# Patient Record
Sex: FEMALE | Race: OTHER | NOT HISPANIC OR LATINO | URBAN - METROPOLITAN AREA
[De-identification: names, ages, dates, MRNs, and addresses within clinical notes are randomized per-mention and may not be internally consistent; named-entity substitution may affect disease eponyms.]

---

## 2017-04-27 ENCOUNTER — EMERGENCY (EMERGENCY)
Facility: HOSPITAL | Age: 47
LOS: 1 days | Discharge: HOME | End: 2017-04-27
Attending: EMERGENCY MEDICINE | Admitting: EMERGENCY MEDICINE
Payer: COMMERCIAL

## 2017-04-27 VITALS
OXYGEN SATURATION: 100 % | SYSTOLIC BLOOD PRESSURE: 135 MMHG | RESPIRATION RATE: 16 BRPM | DIASTOLIC BLOOD PRESSURE: 94 MMHG | HEART RATE: 80 BPM | TEMPERATURE: 98 F

## 2017-04-27 PROBLEM — Z00.00 ENCOUNTER FOR PREVENTIVE HEALTH EXAMINATION: Status: ACTIVE | Noted: 2017-04-27

## 2017-04-27 LAB
APPEARANCE UR: SIGNIFICANT CHANGE UP
BACTERIA # UR AUTO: SIGNIFICANT CHANGE UP
BILIRUB UR-MCNC: NEGATIVE — SIGNIFICANT CHANGE UP
BLOOD UR QL VISUAL: HIGH
COLOR SPEC: YELLOW — SIGNIFICANT CHANGE UP
GLUCOSE UR-MCNC: NEGATIVE — SIGNIFICANT CHANGE UP
KETONES UR-MCNC: NEGATIVE — SIGNIFICANT CHANGE UP
LEUKOCYTE ESTERASE UR-ACNC: SIGNIFICANT CHANGE UP
MUCOUS THREADS # UR AUTO: SIGNIFICANT CHANGE UP
NITRITE UR-MCNC: NEGATIVE — SIGNIFICANT CHANGE UP
PH UR: 6 — SIGNIFICANT CHANGE UP (ref 4.6–8)
PROT UR-MCNC: 30 — HIGH
RBC CASTS # UR COMP ASSIST: SIGNIFICANT CHANGE UP (ref 0–?)
SP GR SPEC: 1.02 — SIGNIFICANT CHANGE UP (ref 1–1.03)
SQUAMOUS # UR AUTO: SIGNIFICANT CHANGE UP
UROBILINOGEN FLD QL: NORMAL E.U. — SIGNIFICANT CHANGE UP (ref 0.1–0.2)
WBC UR QL: SIGNIFICANT CHANGE UP (ref 0–?)

## 2017-04-27 PROCEDURE — 99285 EMERGENCY DEPT VISIT HI MDM: CPT

## 2017-04-27 RX ORDER — KETOROLAC TROMETHAMINE 30 MG/ML
30 SYRINGE (ML) INJECTION ONCE
Qty: 0 | Refills: 0 | Status: DISCONTINUED | OUTPATIENT
Start: 2017-04-27 | End: 2017-04-27

## 2017-04-27 RX ADMIN — Medication 30 MILLIGRAM(S): at 23:40

## 2017-04-27 NOTE — ED ADULT NURSE NOTE - OBJECTIVE STATEMENT
pt states she developed pain in rt side of abdomen last month and this month with period. states after last period pain did not go away. abd soft, c.o. pain going from rt side to buttock and down rt leg. states sometimes when she drives or sits for a long time her rt leg gets numb. denies vomiting. states she is feels like she wants to. states pain has been going on for 2-3 days. c.o. pain with urination

## 2017-04-27 NOTE — ED PROVIDER NOTE - ATTENDING CONTRIBUTION TO CARE
agree with resident note  46 yr old female with PMH of DM presents with right back/flank pain radiating to leg.  Denies urinary symptoms.  Denies vomiting.  ABle to ambulate.    PE: uncomfortable but not toxic; CTAB/L; s1 s2 no m/r/g abd soft/NT/ND back: right paralumar/ flank pain    ua, CT, pain meds

## 2017-04-27 NOTE — ED PROVIDER NOTE - PROGRESS NOTE DETAILS
ATTG NOTE DR. BLANCHARD CT results reviewed with patient, no abnormalities, no fever, no chills, pt resting comfortably, tolerated PO, ambulatory, abdominal exam normal nontender.  Instructed to follow up with PMD in 1-2 days

## 2017-04-27 NOTE — ED ADULT TRIAGE NOTE - CHIEF COMPLAINT QUOTE
Pt c/o of right flank pain radiates to the upper abdomen and right leg  for the past few days states her stomach feels bloated.

## 2017-04-27 NOTE — ED PROVIDER NOTE - MEDICAL DECISION MAKING DETAILS
MSK strain suspected, possible pyelo UA pending, no signs acute cord compression - NT abdomen w/o dawit's

## 2017-04-28 VITALS
SYSTOLIC BLOOD PRESSURE: 120 MMHG | OXYGEN SATURATION: 100 % | RESPIRATION RATE: 16 BRPM | DIASTOLIC BLOOD PRESSURE: 79 MMHG | TEMPERATURE: 98 F | HEART RATE: 76 BPM

## 2017-04-28 LAB
ALBUMIN SERPL ELPH-MCNC: 4.4 G/DL — SIGNIFICANT CHANGE UP (ref 3.3–5)
ALP SERPL-CCNC: 63 U/L — SIGNIFICANT CHANGE UP (ref 40–120)
ALT FLD-CCNC: 16 U/L — SIGNIFICANT CHANGE UP (ref 4–33)
AST SERPL-CCNC: 17 U/L — SIGNIFICANT CHANGE UP (ref 4–32)
BASOPHILS # BLD AUTO: 0.06 K/UL — SIGNIFICANT CHANGE UP (ref 0–0.2)
BASOPHILS NFR BLD AUTO: 0.7 % — SIGNIFICANT CHANGE UP (ref 0–2)
BILIRUB SERPL-MCNC: 0.5 MG/DL — SIGNIFICANT CHANGE UP (ref 0.2–1.2)
BUN SERPL-MCNC: 7 MG/DL — SIGNIFICANT CHANGE UP (ref 7–23)
CALCIUM SERPL-MCNC: 9.4 MG/DL — SIGNIFICANT CHANGE UP (ref 8.4–10.5)
CHLORIDE SERPL-SCNC: 105 MMOL/L — SIGNIFICANT CHANGE UP (ref 98–107)
CO2 SERPL-SCNC: 22 MMOL/L — SIGNIFICANT CHANGE UP (ref 22–31)
CREAT SERPL-MCNC: 0.8 MG/DL — SIGNIFICANT CHANGE UP (ref 0.5–1.3)
EOSINOPHIL # BLD AUTO: 0.17 K/UL — SIGNIFICANT CHANGE UP (ref 0–0.5)
EOSINOPHIL NFR BLD AUTO: 2 % — SIGNIFICANT CHANGE UP (ref 0–6)
GLUCOSE SERPL-MCNC: 97 MG/DL — SIGNIFICANT CHANGE UP (ref 70–99)
HCT VFR BLD CALC: 39.8 % — SIGNIFICANT CHANGE UP (ref 34.5–45)
HGB BLD-MCNC: 13.1 G/DL — SIGNIFICANT CHANGE UP (ref 11.5–15.5)
IMM GRANULOCYTES NFR BLD AUTO: 0.4 % — SIGNIFICANT CHANGE UP (ref 0–1.5)
LYMPHOCYTES # BLD AUTO: 4.57 K/UL — HIGH (ref 1–3.3)
LYMPHOCYTES # BLD AUTO: 53.3 % — HIGH (ref 13–44)
MCHC RBC-ENTMCNC: 29.4 PG — SIGNIFICANT CHANGE UP (ref 27–34)
MCHC RBC-ENTMCNC: 32.9 % — SIGNIFICANT CHANGE UP (ref 32–36)
MCV RBC AUTO: 89.4 FL — SIGNIFICANT CHANGE UP (ref 80–100)
MONOCYTES # BLD AUTO: 0.5 K/UL — SIGNIFICANT CHANGE UP (ref 0–0.9)
MONOCYTES NFR BLD AUTO: 5.8 % — SIGNIFICANT CHANGE UP (ref 2–14)
NEUTROPHILS # BLD AUTO: 3.24 K/UL — SIGNIFICANT CHANGE UP (ref 1.8–7.4)
NEUTROPHILS NFR BLD AUTO: 37.8 % — LOW (ref 43–77)
PLATELET # BLD AUTO: 319 K/UL — SIGNIFICANT CHANGE UP (ref 150–400)
PMV BLD: 10.6 FL — SIGNIFICANT CHANGE UP (ref 7–13)
POTASSIUM SERPL-MCNC: 3.8 MMOL/L — SIGNIFICANT CHANGE UP (ref 3.5–5.3)
POTASSIUM SERPL-SCNC: 3.8 MMOL/L — SIGNIFICANT CHANGE UP (ref 3.5–5.3)
PROT SERPL-MCNC: 7.3 G/DL — SIGNIFICANT CHANGE UP (ref 6–8.3)
RBC # BLD: 4.45 M/UL — SIGNIFICANT CHANGE UP (ref 3.8–5.2)
RBC # FLD: 13.9 % — SIGNIFICANT CHANGE UP (ref 10.3–14.5)
SODIUM SERPL-SCNC: 141 MMOL/L — SIGNIFICANT CHANGE UP (ref 135–145)
WBC # BLD: 8.57 K/UL — SIGNIFICANT CHANGE UP (ref 3.8–10.5)
WBC # FLD AUTO: 8.57 K/UL — SIGNIFICANT CHANGE UP (ref 3.8–10.5)

## 2017-04-28 PROCEDURE — 74176 CT ABD & PELVIS W/O CONTRAST: CPT | Mod: 26

## 2017-04-29 LAB
BACTERIA UR CULT: SIGNIFICANT CHANGE UP
SPECIMEN SOURCE: SIGNIFICANT CHANGE UP

## 2019-09-30 ENCOUNTER — EMERGENCY (EMERGENCY)
Facility: HOSPITAL | Age: 49
LOS: 1 days | Discharge: HOME | End: 2019-09-30
Attending: STUDENT IN AN ORGANIZED HEALTH CARE EDUCATION/TRAINING PROGRAM | Admitting: STUDENT IN AN ORGANIZED HEALTH CARE EDUCATION/TRAINING PROGRAM
Payer: COMMERCIAL

## 2019-09-30 VITALS
DIASTOLIC BLOOD PRESSURE: 93 MMHG | TEMPERATURE: 98 F | RESPIRATION RATE: 18 BRPM | HEART RATE: 91 BPM | SYSTOLIC BLOOD PRESSURE: 148 MMHG | OXYGEN SATURATION: 99 %

## 2019-09-30 VITALS
HEART RATE: 99 BPM | TEMPERATURE: 98 F | DIASTOLIC BLOOD PRESSURE: 75 MMHG | OXYGEN SATURATION: 100 % | RESPIRATION RATE: 16 BRPM | SYSTOLIC BLOOD PRESSURE: 165 MMHG

## 2019-09-30 PROBLEM — E11.9 TYPE 2 DIABETES MELLITUS WITHOUT COMPLICATIONS: Chronic | Status: ACTIVE | Noted: 2017-04-27

## 2019-09-30 LAB
ALBUMIN SERPL ELPH-MCNC: 4.4 G/DL — SIGNIFICANT CHANGE UP (ref 3.3–5)
ALP SERPL-CCNC: 92 U/L — SIGNIFICANT CHANGE UP (ref 40–120)
ALT FLD-CCNC: 33 U/L — SIGNIFICANT CHANGE UP (ref 4–33)
ANION GAP SERPL CALC-SCNC: 13 MMO/L — SIGNIFICANT CHANGE UP (ref 7–14)
APPEARANCE UR: CLEAR — SIGNIFICANT CHANGE UP
AST SERPL-CCNC: 33 U/L — HIGH (ref 4–32)
BASOPHILS # BLD AUTO: 0.08 K/UL — SIGNIFICANT CHANGE UP (ref 0–0.2)
BASOPHILS NFR BLD AUTO: 0.8 % — SIGNIFICANT CHANGE UP (ref 0–2)
BILIRUB SERPL-MCNC: 0.2 MG/DL — SIGNIFICANT CHANGE UP (ref 0.2–1.2)
BILIRUB UR-MCNC: NEGATIVE — SIGNIFICANT CHANGE UP
BLOOD UR QL VISUAL: SIGNIFICANT CHANGE UP
BUN SERPL-MCNC: 12 MG/DL — SIGNIFICANT CHANGE UP (ref 7–23)
CALCIUM SERPL-MCNC: 9.6 MG/DL — SIGNIFICANT CHANGE UP (ref 8.4–10.5)
CHLORIDE SERPL-SCNC: 103 MMOL/L — SIGNIFICANT CHANGE UP (ref 98–107)
CO2 SERPL-SCNC: 25 MMOL/L — SIGNIFICANT CHANGE UP (ref 22–31)
COLOR SPEC: COLORLESS — SIGNIFICANT CHANGE UP
CREAT SERPL-MCNC: 0.92 MG/DL — SIGNIFICANT CHANGE UP (ref 0.5–1.3)
EOSINOPHIL # BLD AUTO: 0.11 K/UL — SIGNIFICANT CHANGE UP (ref 0–0.5)
EOSINOPHIL NFR BLD AUTO: 1.1 % — SIGNIFICANT CHANGE UP (ref 0–6)
GLUCOSE SERPL-MCNC: 169 MG/DL — HIGH (ref 70–99)
GLUCOSE UR-MCNC: NEGATIVE — SIGNIFICANT CHANGE UP
HCT VFR BLD CALC: 39 % — SIGNIFICANT CHANGE UP (ref 34.5–45)
HGB BLD-MCNC: 12.3 G/DL — SIGNIFICANT CHANGE UP (ref 11.5–15.5)
IMM GRANULOCYTES NFR BLD AUTO: 0.6 % — SIGNIFICANT CHANGE UP (ref 0–1.5)
KETONES UR-MCNC: NEGATIVE — SIGNIFICANT CHANGE UP
LEUKOCYTE ESTERASE UR-ACNC: NEGATIVE — SIGNIFICANT CHANGE UP
LYMPHOCYTES # BLD AUTO: 4.34 K/UL — HIGH (ref 1–3.3)
LYMPHOCYTES # BLD AUTO: 43.4 % — SIGNIFICANT CHANGE UP (ref 13–44)
MCHC RBC-ENTMCNC: 28.7 PG — SIGNIFICANT CHANGE UP (ref 27–34)
MCHC RBC-ENTMCNC: 31.5 % — LOW (ref 32–36)
MCV RBC AUTO: 90.9 FL — SIGNIFICANT CHANGE UP (ref 80–100)
MONOCYTES # BLD AUTO: 0.88 K/UL — SIGNIFICANT CHANGE UP (ref 0–0.9)
MONOCYTES NFR BLD AUTO: 8.8 % — SIGNIFICANT CHANGE UP (ref 2–14)
NEUTROPHILS # BLD AUTO: 4.53 K/UL — SIGNIFICANT CHANGE UP (ref 1.8–7.4)
NEUTROPHILS NFR BLD AUTO: 45.3 % — SIGNIFICANT CHANGE UP (ref 43–77)
NITRITE UR-MCNC: NEGATIVE — SIGNIFICANT CHANGE UP
NRBC # FLD: 0 K/UL — SIGNIFICANT CHANGE UP (ref 0–0)
PH UR: 7.5 — SIGNIFICANT CHANGE UP (ref 5–8)
PLATELET # BLD AUTO: 322 K/UL — SIGNIFICANT CHANGE UP (ref 150–400)
PMV BLD: 11 FL — SIGNIFICANT CHANGE UP (ref 7–13)
POTASSIUM SERPL-MCNC: 4.2 MMOL/L — SIGNIFICANT CHANGE UP (ref 3.5–5.3)
POTASSIUM SERPL-SCNC: 4.2 MMOL/L — SIGNIFICANT CHANGE UP (ref 3.5–5.3)
PROT SERPL-MCNC: 7.4 G/DL — SIGNIFICANT CHANGE UP (ref 6–8.3)
PROT UR-MCNC: NEGATIVE — SIGNIFICANT CHANGE UP
RBC # BLD: 4.29 M/UL — SIGNIFICANT CHANGE UP (ref 3.8–5.2)
RBC # FLD: 14.5 % — SIGNIFICANT CHANGE UP (ref 10.3–14.5)
SODIUM SERPL-SCNC: 141 MMOL/L — SIGNIFICANT CHANGE UP (ref 135–145)
SP GR SPEC: 1.01 — SIGNIFICANT CHANGE UP (ref 1–1.04)
TROPONIN T, HIGH SENSITIVITY: 12 NG/L — SIGNIFICANT CHANGE UP (ref ?–14)
TROPONIN T, HIGH SENSITIVITY: 15 NG/L — SIGNIFICANT CHANGE UP (ref ?–14)
UROBILINOGEN FLD QL: NORMAL — SIGNIFICANT CHANGE UP
WBC # BLD: 10 K/UL — SIGNIFICANT CHANGE UP (ref 3.8–10.5)
WBC # FLD AUTO: 10 K/UL — SIGNIFICANT CHANGE UP (ref 3.8–10.5)

## 2019-09-30 PROCEDURE — 99284 EMERGENCY DEPT VISIT MOD MDM: CPT | Mod: 25

## 2019-09-30 PROCEDURE — 93010 ELECTROCARDIOGRAM REPORT: CPT

## 2019-09-30 PROCEDURE — 71046 X-RAY EXAM CHEST 2 VIEWS: CPT | Mod: 26

## 2019-09-30 RX ORDER — IBUPROFEN 200 MG
600 TABLET ORAL ONCE
Refills: 0 | Status: DISCONTINUED | OUTPATIENT
Start: 2019-09-30 | End: 2019-10-04

## 2019-09-30 RX ORDER — SODIUM CHLORIDE 9 MG/ML
1000 INJECTION INTRAMUSCULAR; INTRAVENOUS; SUBCUTANEOUS ONCE
Refills: 0 | Status: COMPLETED | OUTPATIENT
Start: 2019-09-30 | End: 2019-09-30

## 2019-09-30 RX ORDER — METOCLOPRAMIDE HCL 10 MG
10 TABLET ORAL ONCE
Refills: 0 | Status: COMPLETED | OUTPATIENT
Start: 2019-09-30 | End: 2019-09-30

## 2019-09-30 RX ORDER — ACETAMINOPHEN 500 MG
975 TABLET ORAL ONCE
Refills: 0 | Status: COMPLETED | OUTPATIENT
Start: 2019-09-30 | End: 2019-09-30

## 2019-09-30 RX ADMIN — Medication 10 MILLIGRAM(S): at 03:46

## 2019-09-30 RX ADMIN — SODIUM CHLORIDE 1000 MILLILITER(S): 9 INJECTION INTRAMUSCULAR; INTRAVENOUS; SUBCUTANEOUS at 03:46

## 2019-09-30 RX ADMIN — Medication 975 MILLIGRAM(S): at 03:46

## 2019-09-30 NOTE — ED PROVIDER NOTE - OBJECTIVE STATEMENT
Libia Vigil MD: 48 yoF PMH DM2, HLD, B12 deficiency p/w with multiple complaints of gradually sharp global headache, blurry vision, nausea, and chest pain x1 day, As per daughter pt was getting ready to go to a wedding  were sick with a URI Libia Vigil MD: 48 yoF PMH DM2, HLD, B12 deficiency, Migrane p/w with multiple complaints of gradually sharp global headache, blurry vision, nausea, and chest pain x1 day, As per daughter pt was getting ready to go to a wedding. CP nonradiating, worse with palpating her chest not associated with inspiration, position, exertion or food consumption.  pt report new cough that just started when she got to the ED. Pt denies vomiting, shortness of breath, weakness, fatigue, lightheadedness.  Pt has had no recent long trips, surgery, trauma, denies hemoptysis, and has no hx of DVT/PE. PERC 0.  Pt reports chills but denies fevers, . Pts's daughter and son was recently sick with a URI. Pt also states that her daughter and son jus got over a URI.

## 2019-09-30 NOTE — ED PROVIDER NOTE - ATTENDING CONTRIBUTION TO CARE
Jenny Cruz M.D: 48F hx DM, HLD p/w multiple complaints including headche, myalgias, substernal cp (nonradiating, no aggravating/relieving factors, constant). +cough. +nausea no f/c no abd pain, daughter at bedside notes similar symptoms the last few days. on exam pt appears tired, but nontoxic, lungs ctab heart rrr, mucous memranes moist, cn 2-12 itnact gross motor and senosyr intact in all extremities. no neck stiffness, full ROM in neck without difficulty. constellation of symptoms seem consistent with viral syndrome, with some component of migraine which pt has had before. no concern for meningitis or pneumonia. PERC negative so no concern for pe symptoms not c/w dissection. given some cardiac rfs will check ekg, trop, basic labs, migtraine cocktail and reassess, likely dc.

## 2019-09-30 NOTE — ED ADULT NURSE NOTE - OBJECTIVE STATEMENT
49 yo F AAOx4 received to 18 with multiple medical complaints: HA, head pressure, dizziness, CP, SOB with sudden onset ~ 1600, pt tearful at present, unable to answer further assessment questions due to pains' severity, pt tearful at present, hx DM with daily metformin and HLD with no current medication regiment, VSS, awaiting MD soler, will monitor 47 yo F AAOx4 received to 18 with multiple medical complaints: HA, head pressure, n/v, dizziness, CP, SOB with sudden onset ~ 1600, unable to answer further assessment questions due to pains' severity, pt tearful at present, daughter at bedside, hx DM with daily metformin and HLD with no current medication regiment, VSS, awaiting MD soler, will monitor

## 2019-09-30 NOTE — ED PROVIDER NOTE - PHYSICAL EXAMINATION
Gen: AAOx3, non-toxic  Head: NCAT  HEENT: EOMI, PERRLA, oral mucosa moist, normal conjunctiva  Lung: CTAB, no respiratory distress, no wheezes/rhonchi/rales B/L, speaking in full sentences  CV: RRR, no murmurs, rubs or gallops. TTP of thoracic wall  Abd: soft, NTND, no guarding, no CVA tenderness, no rebound tenderness  MSK: no visible deformities, full range of motion of all 4 exts  Neuro: No focal sensory or motor deficits  Skin: Warm, well perfused, no rash  Psych: normal affect.   ~Libia Vigil MD

## 2019-09-30 NOTE — ED PROVIDER NOTE - NSFOLLOWUPINSTRUCTIONS_ED_ALL_ED_FT
1) Please follow-up with your primary care doctor in the next 2-3 days.  Please call tomorrow for an appointment.  If you cannot follow-up with your primary care doctor please return to the ED for any urgent issues. Please also follow up with your Cardiologist in 2-5 days. If you don't have a cardiologist the number to [facility] cardiology is provided above  2) You were given a copy of the tests performed today.  Please bring the results with you and review them with your primary care doctor.  3) If you have any worsening of symptoms or any other concerns please return to the ED immediately. Such as but not limited to including chest pain, shortness or breath, or weakness.  4) Please continue taking your home medications as directed. Please take Motrin (Ibuprofen) 600mg by mouth every 6 hours as needed for pain. Please take this medication with food. Please take Tylenol (Acetaminophen) 975 mg every 6 hours as needed for pain. Please do not exceed more than 4,000mg of Tylenol in a day

## 2019-09-30 NOTE — ED PROVIDER NOTE - NS ED ROS FT
GENERAL: No fever or + chills, EYES: no change in vision, HEENT: no trouble speaking, CARDIAC: + chest pain, palpitation PULMONARY: + cough or SOB, GI: no abdominal pain, + nausea, no vomiting, no diarrhea or constipation, : + dysuria, SKIN: no rashes, NEURO: + headache,  MSK: No muscle pain ~Libia Vigil MD

## 2019-09-30 NOTE — ED PROVIDER NOTE - CLINICAL SUMMARY MEDICAL DECISION MAKING FREE TEXT BOX
iLbia Vigil MD: 48 yoF PMH DM2, HLD, B12 deficiency, Migrane p/w with multiple complaints of gradually sharp global headache, blurry vision, nausea, and chest pain x1 day. Most like viral infection triggering myalgias and migraine. Cp most likely MSk but due to PMH will r/o ACS vs PNA. Will get lab trop, ekg, cxr.

## 2019-09-30 NOTE — ED PROVIDER NOTE - PATIENT PORTAL LINK FT
You can access the FollowMyHealth Patient Portal offered by [facility] by registering at the following website: [email or web address]. By joining [facility]’s FollowMyHealth portal, you will also be able to view your health information using other applications (apps) compatible with our system.

## 2019-09-30 NOTE — ED ADULT TRIAGE NOTE - CHIEF COMPLAINT QUOTE
Pt arrives w/ daughter c/o headache, pressure dizziness, nausea, and chest pain, denies vomiting, blurry vision. Onset around 4pm, increasing in severity. Pt states HA began first then chest pain, had similar episode but resolved quickly 2 days ago. PMHx DM (metformin), HLD (not on meds).  EKG obtained.

## 2019-10-01 LAB
BACTERIA UR CULT: SIGNIFICANT CHANGE UP
SPECIMEN SOURCE: SIGNIFICANT CHANGE UP

## 2021-08-14 ENCOUNTER — INPATIENT (INPATIENT)
Facility: HOSPITAL | Age: 51
LOS: 4 days | Discharge: NURSING, REHAB OR LONG-TERM CARE | End: 2021-08-19
Attending: INTERNAL MEDICINE | Admitting: INTERNAL MEDICINE
Payer: COMMERCIAL

## 2021-08-14 VITALS
TEMPERATURE: 98 F | RESPIRATION RATE: 16 BRPM | HEART RATE: 98 BPM | OXYGEN SATURATION: 100 % | SYSTOLIC BLOOD PRESSURE: 158 MMHG | DIASTOLIC BLOOD PRESSURE: 118 MMHG

## 2021-08-14 DIAGNOSIS — E78.5 HYPERLIPIDEMIA, UNSPECIFIED: ICD-10-CM

## 2021-08-14 DIAGNOSIS — R53.1 WEAKNESS: ICD-10-CM

## 2021-08-14 DIAGNOSIS — E53.8 DEFICIENCY OF OTHER SPECIFIED B GROUP VITAMINS: ICD-10-CM

## 2021-08-14 DIAGNOSIS — Z29.9 ENCOUNTER FOR PROPHYLACTIC MEASURES, UNSPECIFIED: ICD-10-CM

## 2021-08-14 DIAGNOSIS — G43.909 MIGRAINE, UNSPECIFIED, NOT INTRACTABLE, WITHOUT STATUS MIGRAINOSUS: ICD-10-CM

## 2021-08-14 DIAGNOSIS — E11.9 TYPE 2 DIABETES MELLITUS WITHOUT COMPLICATIONS: ICD-10-CM

## 2021-08-14 LAB
A1C WITH ESTIMATED AVERAGE GLUCOSE RESULT: 7.2 % — HIGH (ref 4–5.6)
ALBUMIN SERPL ELPH-MCNC: 5.1 G/DL — HIGH (ref 3.3–5)
ALP SERPL-CCNC: 107 U/L — SIGNIFICANT CHANGE UP (ref 40–120)
ALT FLD-CCNC: 26 U/L — SIGNIFICANT CHANGE UP (ref 4–33)
ANION GAP SERPL CALC-SCNC: 16 MMOL/L — HIGH (ref 7–14)
APTT BLD: 35.2 SEC — SIGNIFICANT CHANGE UP (ref 27–36.3)
AST SERPL-CCNC: 21 U/L — SIGNIFICANT CHANGE UP (ref 4–32)
B PERT DNA SPEC QL NAA+PROBE: SIGNIFICANT CHANGE UP
B PERT+PARAPERT DNA PNL SPEC NAA+PROBE: SIGNIFICANT CHANGE UP
BASOPHILS # BLD AUTO: 0.06 K/UL — SIGNIFICANT CHANGE UP (ref 0–0.2)
BASOPHILS NFR BLD AUTO: 0.9 % — SIGNIFICANT CHANGE UP (ref 0–2)
BILIRUB SERPL-MCNC: 0.5 MG/DL — SIGNIFICANT CHANGE UP (ref 0.2–1.2)
BLD GP AB SCN SERPL QL: NEGATIVE — SIGNIFICANT CHANGE UP
BORDETELLA PARAPERTUSSIS (RAPRVP): SIGNIFICANT CHANGE UP
BUN SERPL-MCNC: 11 MG/DL — SIGNIFICANT CHANGE UP (ref 7–23)
C PNEUM DNA SPEC QL NAA+PROBE: SIGNIFICANT CHANGE UP
CALCIUM SERPL-MCNC: 9.8 MG/DL — SIGNIFICANT CHANGE UP (ref 8.4–10.5)
CHLORIDE SERPL-SCNC: 103 MMOL/L — SIGNIFICANT CHANGE UP (ref 98–107)
CK MB BLD-MCNC: <0.7 % — SIGNIFICANT CHANGE UP (ref 0–2.5)
CK MB BLD-MCNC: <1 % — SIGNIFICANT CHANGE UP (ref 0–2.5)
CK MB CFR SERPL CALC: <1 NG/ML — SIGNIFICANT CHANGE UP
CK MB CFR SERPL CALC: <1 NG/ML — SIGNIFICANT CHANGE UP
CK SERPL-CCNC: 144 U/L — SIGNIFICANT CHANGE UP (ref 25–170)
CK SERPL-CCNC: 99 U/L — SIGNIFICANT CHANGE UP (ref 25–170)
CO2 SERPL-SCNC: 23 MMOL/L — SIGNIFICANT CHANGE UP (ref 22–31)
CREAT SERPL-MCNC: 0.72 MG/DL — SIGNIFICANT CHANGE UP (ref 0.5–1.3)
EOSINOPHIL # BLD AUTO: 0.09 K/UL — SIGNIFICANT CHANGE UP (ref 0–0.5)
EOSINOPHIL NFR BLD AUTO: 1.3 % — SIGNIFICANT CHANGE UP (ref 0–6)
ESTIMATED AVERAGE GLUCOSE: 160 — SIGNIFICANT CHANGE UP
FLUAV SUBTYP SPEC NAA+PROBE: SIGNIFICANT CHANGE UP
FLUBV RNA SPEC QL NAA+PROBE: SIGNIFICANT CHANGE UP
FOLATE SERPL-MCNC: 20 NG/ML — HIGH (ref 3.1–17.5)
GLUCOSE BLDC GLUCOMTR-MCNC: 104 MG/DL — HIGH (ref 70–99)
GLUCOSE BLDC GLUCOMTR-MCNC: 105 MG/DL — HIGH (ref 70–99)
GLUCOSE SERPL-MCNC: 153 MG/DL — HIGH (ref 70–99)
HADV DNA SPEC QL NAA+PROBE: SIGNIFICANT CHANGE UP
HCG SERPL-ACNC: <5 MIU/ML — SIGNIFICANT CHANGE UP
HCOV 229E RNA SPEC QL NAA+PROBE: SIGNIFICANT CHANGE UP
HCOV HKU1 RNA SPEC QL NAA+PROBE: SIGNIFICANT CHANGE UP
HCOV NL63 RNA SPEC QL NAA+PROBE: SIGNIFICANT CHANGE UP
HCOV OC43 RNA SPEC QL NAA+PROBE: SIGNIFICANT CHANGE UP
HCT VFR BLD CALC: 45.5 % — HIGH (ref 34.5–45)
HGB BLD-MCNC: 14.7 G/DL — SIGNIFICANT CHANGE UP (ref 11.5–15.5)
HMPV RNA SPEC QL NAA+PROBE: SIGNIFICANT CHANGE UP
HPIV1 RNA SPEC QL NAA+PROBE: SIGNIFICANT CHANGE UP
HPIV2 RNA SPEC QL NAA+PROBE: SIGNIFICANT CHANGE UP
HPIV3 RNA SPEC QL NAA+PROBE: SIGNIFICANT CHANGE UP
HPIV4 RNA SPEC QL NAA+PROBE: SIGNIFICANT CHANGE UP
IANC: 3.09 K/UL — SIGNIFICANT CHANGE UP (ref 1.5–8.5)
IMM GRANULOCYTES NFR BLD AUTO: 0.3 % — SIGNIFICANT CHANGE UP (ref 0–1.5)
INR BLD: 1.04 RATIO — SIGNIFICANT CHANGE UP (ref 0.88–1.16)
LYMPHOCYTES # BLD AUTO: 3.25 K/UL — SIGNIFICANT CHANGE UP (ref 1–3.3)
LYMPHOCYTES # BLD AUTO: 47.2 % — HIGH (ref 13–44)
M PNEUMO DNA SPEC QL NAA+PROBE: SIGNIFICANT CHANGE UP
MCHC RBC-ENTMCNC: 28.3 PG — SIGNIFICANT CHANGE UP (ref 27–34)
MCHC RBC-ENTMCNC: 32.3 GM/DL — SIGNIFICANT CHANGE UP (ref 32–36)
MCV RBC AUTO: 87.7 FL — SIGNIFICANT CHANGE UP (ref 80–100)
MONOCYTES # BLD AUTO: 0.37 K/UL — SIGNIFICANT CHANGE UP (ref 0–0.9)
MONOCYTES NFR BLD AUTO: 5.4 % — SIGNIFICANT CHANGE UP (ref 2–14)
NEUTROPHILS # BLD AUTO: 3.09 K/UL — SIGNIFICANT CHANGE UP (ref 1.8–7.4)
NEUTROPHILS NFR BLD AUTO: 44.9 % — SIGNIFICANT CHANGE UP (ref 43–77)
NRBC # BLD: 0 /100 WBCS — SIGNIFICANT CHANGE UP
NRBC # FLD: 0 K/UL — SIGNIFICANT CHANGE UP
PLATELET # BLD AUTO: 325 K/UL — SIGNIFICANT CHANGE UP (ref 150–400)
POTASSIUM SERPL-MCNC: 4.1 MMOL/L — SIGNIFICANT CHANGE UP (ref 3.5–5.3)
POTASSIUM SERPL-SCNC: 4.1 MMOL/L — SIGNIFICANT CHANGE UP (ref 3.5–5.3)
PROT SERPL-MCNC: 7.9 G/DL — SIGNIFICANT CHANGE UP (ref 6–8.3)
PROTHROM AB SERPL-ACNC: 11.9 SEC — SIGNIFICANT CHANGE UP (ref 10.6–13.6)
RAPID RVP RESULT: SIGNIFICANT CHANGE UP
RBC # BLD: 5.19 M/UL — SIGNIFICANT CHANGE UP (ref 3.8–5.2)
RBC # FLD: 13.9 % — SIGNIFICANT CHANGE UP (ref 10.3–14.5)
RH IG SCN BLD-IMP: POSITIVE — SIGNIFICANT CHANGE UP
RSV RNA SPEC QL NAA+PROBE: SIGNIFICANT CHANGE UP
RV+EV RNA SPEC QL NAA+PROBE: SIGNIFICANT CHANGE UP
SARS-COV-2 RNA SPEC QL NAA+PROBE: SIGNIFICANT CHANGE UP
SODIUM SERPL-SCNC: 142 MMOL/L — SIGNIFICANT CHANGE UP (ref 135–145)
TROPONIN T, HIGH SENSITIVITY RESULT: <6 NG/L — SIGNIFICANT CHANGE UP
TROPONIN T, HIGH SENSITIVITY RESULT: <6 NG/L — SIGNIFICANT CHANGE UP
VIT B12 SERPL-MCNC: 451 PG/ML — SIGNIFICANT CHANGE UP (ref 200–900)
WBC # BLD: 6.88 K/UL — SIGNIFICANT CHANGE UP (ref 3.8–10.5)
WBC # FLD AUTO: 6.88 K/UL — SIGNIFICANT CHANGE UP (ref 3.8–10.5)

## 2021-08-14 PROCEDURE — 70496 CT ANGIOGRAPHY HEAD: CPT | Mod: 26

## 2021-08-14 PROCEDURE — 0042T: CPT

## 2021-08-14 PROCEDURE — 70498 CT ANGIOGRAPHY NECK: CPT | Mod: 26

## 2021-08-14 PROCEDURE — 93010 ELECTROCARDIOGRAM REPORT: CPT

## 2021-08-14 PROCEDURE — 71045 X-RAY EXAM CHEST 1 VIEW: CPT | Mod: 26

## 2021-08-14 PROCEDURE — 99285 EMERGENCY DEPT VISIT HI MDM: CPT | Mod: 25

## 2021-08-14 PROCEDURE — 99223 1ST HOSP IP/OBS HIGH 75: CPT

## 2021-08-14 RX ORDER — PANTOPRAZOLE SODIUM 20 MG/1
40 TABLET, DELAYED RELEASE ORAL DAILY
Refills: 0 | Status: DISCONTINUED | OUTPATIENT
Start: 2021-08-14 | End: 2021-08-19

## 2021-08-14 RX ORDER — SODIUM CHLORIDE 9 MG/ML
1000 INJECTION, SOLUTION INTRAVENOUS
Refills: 0 | Status: DISCONTINUED | OUTPATIENT
Start: 2021-08-14 | End: 2021-08-19

## 2021-08-14 RX ORDER — ACETAMINOPHEN 500 MG
650 TABLET ORAL EVERY 6 HOURS
Refills: 0 | Status: DISCONTINUED | OUTPATIENT
Start: 2021-08-14 | End: 2021-08-15

## 2021-08-14 RX ORDER — GLUCAGON INJECTION, SOLUTION 0.5 MG/.1ML
1 INJECTION, SOLUTION SUBCUTANEOUS ONCE
Refills: 0 | Status: DISCONTINUED | OUTPATIENT
Start: 2021-08-14 | End: 2021-08-19

## 2021-08-14 RX ORDER — INSULIN LISPRO 100/ML
VIAL (ML) SUBCUTANEOUS
Refills: 0 | Status: DISCONTINUED | OUTPATIENT
Start: 2021-08-14 | End: 2021-08-19

## 2021-08-14 RX ORDER — DEXTROSE 50 % IN WATER 50 %
25 SYRINGE (ML) INTRAVENOUS ONCE
Refills: 0 | Status: DISCONTINUED | OUTPATIENT
Start: 2021-08-14 | End: 2021-08-19

## 2021-08-14 RX ORDER — ACETAMINOPHEN 500 MG
1000 TABLET ORAL ONCE
Refills: 0 | Status: COMPLETED | OUTPATIENT
Start: 2021-08-14 | End: 2021-08-14

## 2021-08-14 RX ORDER — ASPIRIN/CALCIUM CARB/MAGNESIUM 324 MG
81 TABLET ORAL DAILY
Refills: 0 | Status: DISCONTINUED | OUTPATIENT
Start: 2021-08-14 | End: 2021-08-19

## 2021-08-14 RX ORDER — DEXTROSE 50 % IN WATER 50 %
15 SYRINGE (ML) INTRAVENOUS ONCE
Refills: 0 | Status: DISCONTINUED | OUTPATIENT
Start: 2021-08-14 | End: 2021-08-19

## 2021-08-14 RX ORDER — NITROGLYCERIN 6.5 MG
0.4 CAPSULE, EXTENDED RELEASE ORAL ONCE
Refills: 0 | Status: COMPLETED | OUTPATIENT
Start: 2021-08-14 | End: 2021-08-14

## 2021-08-14 RX ORDER — DEXTROSE 50 % IN WATER 50 %
12.5 SYRINGE (ML) INTRAVENOUS ONCE
Refills: 0 | Status: DISCONTINUED | OUTPATIENT
Start: 2021-08-14 | End: 2021-08-19

## 2021-08-14 RX ORDER — INSULIN LISPRO 100/ML
VIAL (ML) SUBCUTANEOUS AT BEDTIME
Refills: 0 | Status: DISCONTINUED | OUTPATIENT
Start: 2021-08-14 | End: 2021-08-19

## 2021-08-14 RX ORDER — ATORVASTATIN CALCIUM 80 MG/1
80 TABLET, FILM COATED ORAL AT BEDTIME
Refills: 0 | Status: DISCONTINUED | OUTPATIENT
Start: 2021-08-14 | End: 2021-08-19

## 2021-08-14 RX ORDER — ASPIRIN/CALCIUM CARB/MAGNESIUM 324 MG
300 TABLET ORAL ONCE
Refills: 0 | Status: DISCONTINUED | OUTPATIENT
Start: 2021-08-14 | End: 2021-08-14

## 2021-08-14 RX ADMIN — PANTOPRAZOLE SODIUM 40 MILLIGRAM(S): 20 TABLET, DELAYED RELEASE ORAL at 22:58

## 2021-08-14 RX ADMIN — Medication 0.4 MILLIGRAM(S): at 22:58

## 2021-08-14 RX ADMIN — Medication 400 MILLIGRAM(S): at 12:50

## 2021-08-14 RX ADMIN — Medication 1000 MILLIGRAM(S): at 13:22

## 2021-08-14 NOTE — H&P ADULT - ATTENDING COMMENTS
Arline Espinoza 51 yo lady Sinhala speaking with h/o T2DM, B12 deficiency, daughter at bedside patient gestures for her to translate. P/W R. sided weakness which started some time today.    Failed dysphagia screen  Code stroke, seen by neurology  Permissive hypertension  Will require stroke work up, MRI head, echo, PT eval, Speech swallow eval (failed dysphagia screen), TSH, B12, folic acid level  Aspirin and atorvastatin daily  Continue the rest of the work up and management as stated above.

## 2021-08-14 NOTE — ED ADULT NURSE NOTE - OBJECTIVE STATEMENT
Pt presents to ED ambulatory from home with c/o R sided facial numbness, RUE and RLE numbness and weakness x 2 days. Pt unable to move R side. MD at bedside, code stroke initiated. Pt denies dizziness or vision changes. Pt found to be hypertensive, and reports headache. Pt has hx of DM and vitamin b12 deficiency. Pt brought to CT scan, #18g IV placed to LAC, lab work collected as ordered. Neuro at bedside. Will continue to monitor.

## 2021-08-14 NOTE — ED PROVIDER NOTE - OBJECTIVE STATEMENT
49 yo F (Italian speaking, translation provided by daughter at bedside) PMH DM2, HLD, B12 deficiency, Migraines presents with acute weakness this morning in the context of extremity numbness x2 days. Patient noted "tingling" sensation throughout all 4 extremities for the past 2 days prior to ED visit. Patient then complained of acute onset generalized weakness this AM. Last known normal of 11PM on 8/13. Patient's daughter noted patient had difficulty making coffee this AM and prompted ED visit. Patient also noted to have difficulty with ambulation. Upon arrival to the ED right sided UE and LE weakness noted with increased numbness on the right face/UE/LE also noted. CODE STROKE was called

## 2021-08-14 NOTE — H&P ADULT - ASSESSMENT
49 y/o female (Persian speaking, translation provided by daughter at bedside) with PMH of T2DM not on prescribe medications, HLD, B12 deficiency with neuropathy, migraines presented to ED with acute weakness, tingling of upper and lower extremities, right > left. Admitted for CVA management.

## 2021-08-14 NOTE — ED ADULT TRIAGE NOTE - CHIEF COMPLAINT QUOTE
Pt brought in by daughter for numbness to both hands, numbness to both legs, + weakness, numbness to lips and headache/warmness to Rt side of head for the past 2 days. Pt currently afebrile. Pt denies chest pain, no shortness of breath.

## 2021-08-14 NOTE — ED PROVIDER NOTE - ST/T WAVE
pregnancy.





Infant's condition and plans as above reviewed and discussed with parents at 

bedside who agreed with the plans and voiced understanding.











Pepper Harvey MD R2 May 17, 2018 16:05
no KAMALJIT/depr, no TWA

## 2021-08-14 NOTE — ED PROVIDER NOTE - PROGRESS NOTE DETAILS
Code stroke work up revealed multifocal narrowing in A2 of anterior cerebral artery. admitting to tele for further stroke work up

## 2021-08-14 NOTE — CONSULT NOTE ADULT - SUBJECTIVE AND OBJECTIVE BOX
HPI: A 49 yo F (Romansh speaking, translation provided by daughter at bedside) PMH DM2, HLD, B12 deficiency w/ neuropathy, migraines presented to ED with acute weakness this morning in the context of extremity numbness x2 days. Patient noted "tingling" sensation throughout all 4 extremities for the past 2 days prior to ED visit. LNW was on 8/12/2021 at 11 AM.  Code stroke was called on 8/14/21 at 11:20 AM for R sided weakness. According to daughter, 2 days ago pt first had tingling and a headache, yesterday headache worsened, this morning she had motor deficits when she woke up. Pt was reported to have intermittent episodes of slurred speech. At baseline pt is mobile. Pt has no history of CVA or blood thinners. Pt is not a TPA candidate as she is outside window.      NIHSS 6 (right arm drift to bed, right leg no effort against gravity, mild to moderate sensory loss on right arm/leg)  preMRS: 0      ROS: A 10-system ROS was performed and is negative except for those items noted above and/or in the HPI.    PAST MEDICAL & SURGICAL HISTORY:  DM (diabetes mellitus)    No significant past surgical history      FAMILY HISTORY:      SOCIAL HISTORY: SOCIAL HISTORY:     Marital Status: (  )   (  ) Single  (  )   (  )      Occupation:      Lives: (  ) alone  (  ) with children   (  ) with spouse  (  ) with parents  (  ) other     Illicit Drug Use: (  ) never used  (  ) other _____     Tobacco Use:  (  ) never smoked  (  ) former smoker  (  ) current smoker  (  ) pack year  (  ) last cigarette date     Alcohol Use:      Sexual History:        MEDICATIONS  Home Medications:      MEDICATIONS  (STANDING):    MEDICATIONS  (PRN):      ALLERGIES/INTOLERANCES:  Allergies  penicillin (Unknown)    Intolerances      OBJECTIVE:  VITALS   Vital Signs Last 24 Hrs  T(C): 36.9 (14 Aug 2021 13:15), Max: 36.9 (14 Aug 2021 13:15)  T(F): 98.4 (14 Aug 2021 13:15), Max: 98.4 (14 Aug 2021 13:15)  HR: 78 (14 Aug 2021 13:15) (78 - 98)  BP: 140/90 (14 Aug 2021 13:15) (140/90 - 158/118)  BP(mean): --  RR: 15 (14 Aug 2021 13:15) (15 - 16)  SpO2: 99% (14 Aug 2021 13:15) (99% - 100%)    PHYSICAL EXAM:  Neurological Exam:  Mental Status: Orientated to self, date and place.  Attention intact.  No dysarthria, aphasia or neglect. Pt can name objects and say a full sentence clearly. Knowledge intact.  Registration intact.      Cranial Nerves: PERRL, EOMI, VFF, no nystagmus or diplopia.  CN V1-3 intact to light touch.  No facial asymmetry.  Hearing intact.  Tongue midline.  Sternocleidomastoid and Trapezius intact bilaterally.    Motor:   Tone: normal            Strength:     Upper extremity                             Delt       Bicep    Tricep                                               R         2/5        3/5        3/5       3/5                                            L          5/5        5/5        5/5       5/5  Lower extremity                              HF          KE         KF        DF       PF                                            R         0/5        0/5        0/5       0/5       0/5                                            L         5/5        5/5       5/5       5/5        5/5    right arm drift to bed, right leg no effort against gravity,     Pronator drift: none                 Dysmetria: None to finger-nose-finger or heel-shin-heel     Tremor: No resting, postural or action tremor.  No myoclonus.    Sensation: mild to moderate sensory loss to light tough on right arm/leg      Deep Tendon Reflexes: 2+ bilateral biceps, triceps, brachioradialis, knee and ankle  Toes flexor bilaterally    Gait: Pt unable to walk    LABORATORY:  CBC                       14.7   6.88  )-----------( 325      ( 14 Aug 2021 11:32 )             45.5     Chem 08-14    142  |  103  |  11  ----------------------------<  153<H>  4.1   |  23  |  0.72    Ca    9.8      14 Aug 2021 11:32    TPro  7.9  /  Alb  5.1<H>  /  TBili  0.5  /  DBili  x   /  AST  21  /  ALT  26  /  AlkPhos  107  08-14    LFTs LIVER FUNCTIONS - ( 14 Aug 2021 11:32 )  Alb: 5.1 g/dL / Pro: 7.9 g/dL / ALK PHOS: 107 U/L / ALT: 26 U/L / AST: 21 U/L / GGT: x           Coagulopathy PT/INR - ( 14 Aug 2021 11:32 )   PT: 11.9 sec;   INR: 1.04 ratio         PTT - ( 14 Aug 2021 11:32 )  PTT:35.2 sec  Lipid Panel   A1c   Cardiac enzymes CARDIAC MARKERS ( 14 Aug 2021 11:32 )  x     / x     / 144 U/L / x     / <1.0 ng/mL      U/A   CSF  Immunological  Other    STUDIES & IMAGING:  Studies (EKG, EEG, EMG, etc):     Radiology (XR, CT, MR, U/S, TTE/RONNY):  No acute intracranial hemorrhage or acute territorial infarct. If acute ischemia is a strong clinical concern, MRI exam is recommended for further evaluation if there is no contraindication.     Findings were discussed by Dr. Fletcher with  MD Tejal on 8/14/2021 11:38 AM with read back confirmation.    Unremarkable CT perfusion.    No hemodynamically significant stenosis in the neck.    Multifocal narrowing of the A2 segments of anterior cerebral arteries.   HPI: A 51 yo F (Romanian speaking, translation provided by daughter at bedside) PMH DM2, HLD, B12 deficiency w/ neuropathy, migraines presented to ED with acute weakness this morning in the context of extremity numbness x2 days. Patient noted "tingling" sensation throughout all 4 extremities for the past 2 days prior to ED visit. LNW was on 8/12/2021 at 11 AM.  Code stroke was called on 8/14/21 at 11:20 AM for R sided weakness. According to daughter, 2 days ago pt first had tingling and a headache, yesterday headache worsened, this morning she had motor deficits when she woke up. Pt was reported to have intermittent episodes of slurred speech. BP in /118. At baseline pt is mobile. Pt has no history of CVA or blood thinners. Pt is not a TPA candidate as she is outside window.      NIHSS 6 (right arm drift to bed, right leg no effort against gravity, mild to moderate sensory loss on right arm/leg)  preMRS: 0      ROS: A 10-system ROS was performed and is negative except for those items noted above and/or in the HPI.    PAST MEDICAL & SURGICAL HISTORY:  DM (diabetes mellitus)    No significant past surgical history      FAMILY HISTORY:      SOCIAL HISTORY: SOCIAL HISTORY:     Marital Status: (  )   (  ) Single  (  )   (  )      Occupation:      Lives: (  ) alone  (  ) with children   (  ) with spouse  (  ) with parents  (  ) other     Illicit Drug Use: (  ) never used  (  ) other _____     Tobacco Use:  (  ) never smoked  (  ) former smoker  (  ) current smoker  (  ) pack year  (  ) last cigarette date     Alcohol Use:      Sexual History:        MEDICATIONS  Home Medications:      MEDICATIONS  (STANDING):    MEDICATIONS  (PRN):      ALLERGIES/INTOLERANCES:  Allergies  penicillin (Unknown)    Intolerances      OBJECTIVE:  VITALS   Vital Signs Last 24 Hrs  T(C): 36.9 (14 Aug 2021 13:15), Max: 36.9 (14 Aug 2021 13:15)  T(F): 98.4 (14 Aug 2021 13:15), Max: 98.4 (14 Aug 2021 13:15)  HR: 78 (14 Aug 2021 13:15) (78 - 98)  BP: 140/90 (14 Aug 2021 13:15) (140/90 - 158/118)  BP(mean): --  RR: 15 (14 Aug 2021 13:15) (15 - 16)  SpO2: 99% (14 Aug 2021 13:15) (99% - 100%)    PHYSICAL EXAM:  Neurological Exam:  Mental Status: Orientated to self, date and place.  Attention intact.  No dysarthria, aphasia or neglect. Pt can name objects and say a full sentence clearly. Knowledge intact.  Registration intact.      Cranial Nerves: PERRL, EOMI, VFF, no nystagmus or diplopia.  CN V1-3 intact to light touch.  No facial asymmetry.  Hearing intact.  Tongue midline.  Sternocleidomastoid and Trapezius intact bilaterally.    Motor:   Tone: normal            Strength:     Upper extremity                             Delt       Bicep    Tricep                                               R         2/5        3/5        3/5       3/5                                            L          5/5        5/5        5/5       5/5  Lower extremity                              HF          KE         KF        DF       PF                                            R         0/5        0/5        0/5       0/5       0/5                                            L         5/5        5/5       5/5       5/5        5/5    right arm drift to bed, right leg no effort against gravity,     Pronator drift: none                 Dysmetria: None to finger-nose-finger or heel-shin-heel     Tremor: No resting, postural or action tremor.  No myoclonus.    Sensation: mild to moderate sensory loss to light tough on right arm/leg      Deep Tendon Reflexes: 2+ bilateral biceps, triceps, brachioradialis, knee and ankle  Toes flexor bilaterally    Gait: Pt unable to walk    LABORATORY:  CBC                       14.7   6.88  )-----------( 325      ( 14 Aug 2021 11:32 )             45.5     Chem 08-14    142  |  103  |  11  ----------------------------<  153<H>  4.1   |  23  |  0.72    Ca    9.8      14 Aug 2021 11:32    TPro  7.9  /  Alb  5.1<H>  /  TBili  0.5  /  DBili  x   /  AST  21  /  ALT  26  /  AlkPhos  107  08-14    LFTs LIVER FUNCTIONS - ( 14 Aug 2021 11:32 )  Alb: 5.1 g/dL / Pro: 7.9 g/dL / ALK PHOS: 107 U/L / ALT: 26 U/L / AST: 21 U/L / GGT: x           Coagulopathy PT/INR - ( 14 Aug 2021 11:32 )   PT: 11.9 sec;   INR: 1.04 ratio         PTT - ( 14 Aug 2021 11:32 )  PTT:35.2 sec  Lipid Panel   A1c   Cardiac enzymes CARDIAC MARKERS ( 14 Aug 2021 11:32 )  x     / x     / 144 U/L / x     / <1.0 ng/mL      U/A   CSF  Immunological  Other    STUDIES & IMAGING:  Studies (EKG, EEG, EMG, etc):     Radiology (XR, CT, MR, U/S, TTE/RONNY):  No acute intracranial hemorrhage or acute territorial infarct. If acute ischemia is a strong clinical concern, MRI exam is recommended for further evaluation if there is no contraindication.     Findings were discussed by Dr. Fletcher with  MD Tejal on 8/14/2021 11:38 AM with read back confirmation.    Unremarkable CT perfusion.    No hemodynamically significant stenosis in the neck.    Multifocal narrowing of the A2 segments of anterior cerebral arteries.

## 2021-08-14 NOTE — ED PROVIDER NOTE - CLINICAL SUMMARY MEDICAL DECISION MAKING FREE TEXT BOX
51 yo F (Lao speaking, translation provided by daughter at bedside) PMH DM2, HLD, B12 deficiency, Migraines presents with acute weakness  -CODE STROKE called

## 2021-08-14 NOTE — H&P ADULT - PROBLEM SELECTOR PLAN 1
- Monitor on telemetry  - House Neurology consulted and recommended :  - Permissive HTN up to 220/120 mmHg for first 24 to 48 hours after the symptom onset followed by gradual   normotension.  - MRI head w/o contrast  - RN dysphagia screen  - Start ASA 81 mg PO daily and Atorvastatin 80 mg PO QHS  - Seizure, fall and aspiration precautions  - Elevate head of the bed to 30 degree  - Neuro checks Q4H  - Speech and swallow  - PT/OT consult - Monitor on telemetry  - House Neurology consulted and recommended :  - Permissive HTN up to 220/120 mmHg for first 24 to 48 hours after the symptom onset followed by gradual   normotension.  - MRI head w/o contrast  - Echo with bubble study  - RN dysphagia screen  - Start ASA 81 mg PO daily and Atorvastatin 80 mg PO QHS  - Seizure, fall and aspiration precautions  - Elevate head of the bed to 30 degree  - Neuro checks Q4H  - Speech and swallow  - PT/OT consult

## 2021-08-14 NOTE — ED PROVIDER NOTE - ATTENDING CONTRIBUTION TO CARE
MD Jaime:  patient seen and evaluated with the resident.  I was present for key portions of the History & Physical, and I agree with the Impression & Plan.  MD Jaime: MD Jaime:  patient seen and evaluated with the resident.  I was present for key portions of the History & Physical, and I agree with the Impression & Plan.  MD Jaime:  49 yo F, c/o 2d progressive worsening BLE numbness, and LUE/LLE weakness that started this morning, when she woke up.  Last known "normal" (without any motor complaint) was 11pm.  Mhx of DM2, migraine, B12 deficiency.   Quality:  when woke up had difficulty walking and making coffee  Associated Sx:  no facial droop, no slurred speech or aphasia.  Better: none.  Worse:  getting worse over time.   VS: wnl.  Adult F, tearful, voice is soft, eyes closed (opens to verbal).   Strength RUE 5/5, RLE 5/5, LUE 3/5, LLE 4/5   No facial droop.  no Aphasia.   Impression/Plan:  History and exam concerning for possible acute ischemic stroke < 24 hrs from last normal.  Code stroke called at this time.  Emergent CT/CTA imaging ordered.

## 2021-08-14 NOTE — H&P ADULT - PROBLEM SELECTOR PLAN 3
Start with Atorvastatin 80 mg as per neuro  DASH diet when able to take PO  Check lipid profile in am.

## 2021-08-14 NOTE — H&P ADULT - HISTORY OF PRESENT ILLNESS
51 y/o female (Nepali speaking, translation provided by daughter at bedside) with PMH of T2DM not on prescribe medications, HLD, B12 deficiency with neuropathy, migraines presented to ED with acute weakness, tingling of upper and lower extremities, right > left. Patient states she was in her usual state of health when she developed a headache this morning and she started feeling the weakness to the point she had difficulty walking. She had tingling sensation throughout all 4 extremities for the past 2 days prior to today.     In ED, BP was 160-118 and CT brain was negative for acute pathology.

## 2021-08-14 NOTE — CONSULT NOTE ADULT - ASSESSMENT
A 49 yo F (Spanish speaking, translation provided by daughter at bedside) PMH DM2, HLD, B12 deficiency w/ neuropathy, migraines presented to ED with acute weakness this morning in the context of extremity numbness x2 days. Patient noted "tingling" sensation throughout all 4 extremities for the past 2 days prior to ED visit. LNW was on 8/12/2021 at 11 AM.  Code stroke was called on 8/14/21 at 11:20 AM for R sided weakness. According to daughter, 2 days ago pt first had tingling and a headache, yesterday headache worsened, this morning she had motor deficits when she woke up. Pt was reported to have intermittent episodes of slurred speech. Imaging was negative for acute findings. Neurological exam revealed right arm drift to bed, right leg no effort against gravity, mild to moderate sensory loss on right arm/leg. Decreased strength in right arm and right leg.     NIHSS 6 (right arm drift to bed, right leg no effort against gravity, mild to moderate sensory loss on right arm/leg)  preMRS: 0    Impression: Decreased strength in right arm and right leg,  mild to moderate sensory loss to light touch on right arm/leg may be 2/2 to possible CVA vs. unknown etiology    Recommendations:   [] MRI brain w/o contrast  [] Permissive HTN to 220/110.  []NPO until dysphagia screen passed, if doesn't pass speech/swallow eval  []PT/OT eval  [] metabolic/infectious work up as per primary team    Case to be discussed with Dr. Souza.   A 51 yo F (Lithuanian speaking, translation provided by daughter at bedside) PMH DM2, HLD, HTN, B12 deficiency w/ neuropathy, migraines presented to ED with acute weakness this morning in the context of extremity numbness x2 days. Patient noted "tingling" sensation throughout all 4 extremities for the past 2 days prior to ED visit. LNW was on 8/12/2021 at 11 AM.  Code stroke was called on 8/14/21 at 11:20 AM for R sided weakness. According to daughter, 2 days ago pt first had tingling and a headache, yesterday headache worsened, this morning she had motor deficits when she woke up. Pt was reported to have intermittent episodes of slurred speech. Imaging was negative for acute findings. Neurological exam revealed right arm drift to bed, right leg no effort against gravity, mild to moderate sensory loss on right arm/leg. Decreased strength in right arm and right leg.     NIHSS 6 (right arm drift to bed, right leg no effort against gravity, mild to moderate sensory loss on right arm/leg)  preMRS: 0    Impression: Decreased strength in right arm and right leg,  mild to moderate sensory loss to light touch on right arm/leg may be 2/2 to possible CVA vs. HTN encephalopathy vs. metabolic/infectious causes    Recommendations:   [] MRI brain w/o contrast  [] Permissive HTN to 220/110.  []NPO until dysphagia screen passed, if doesn't pass speech/swallow eval  []PT/OT eval  []May start baby aspirin daily  []TTE  []Fall precautions, aspiration precautions  [] metabolic/infectious work up as per primary team  [] UA, TSH, T4, B12, folic acid level  [] pain management as per primary team    Case to be discussed with Dr. Souza.   A 49 yo F (Serbian speaking, translation provided by daughter at bedside) PMH DM2, HLD, HTN, B12 deficiency w/ neuropathy, migraines presented to ED with acute weakness this morning in the context of extremity numbness x2 days. Patient noted "tingling" sensation throughout all 4 extremities for the past 2 days prior to ED visit. LNW was on 8/12/2021 at 11 AM.  Code stroke was called on 8/14/21 at 11:20 AM for R sided weakness. According to daughter, 2 days ago pt first had tingling and a headache, yesterday headache worsened, this morning she had motor deficits when she woke up. Pt was reported to have intermittent episodes of slurred speech. Imaging was negative for acute findings. Neurological exam revealed right arm drift to bed, right leg no effort against gravity, mild to moderate sensory loss on right arm/leg. Decreased strength in right arm and right leg.     NIHSS 6 (right arm drift to bed, right leg no effort against gravity, mild to moderate sensory loss on right arm/leg)  preMRS: 0    Impression: Decreased strength in right arm and right leg,  mild to moderate sensory loss to light touch on right arm/leg may be 2/2 to possible CVA vs. HTN encephalopathy vs. metabolic/infectious causes    Recommendations:   [] MRI brain w/o contrast  [] Permissive HTN to 220/110.  []NPO until dysphagia screen passed, if doesn't pass speech/swallow eval  []PT/OT eval  []May start baby aspirin daily and 80 mg atorvastatin daily.  []Fall precautions, aspiration precautions (bed to 30 degrees)  [] metabolic/infectious work up as per primary team  [] UA, TSH, T4, B12, folic acid level  [] pain management as per primary team    Case to be discussed with Dr. Souza.   A 51 yo F (Persian speaking, translation provided by daughter at bedside) PMH DM2, HLD, HTN, B12 deficiency w/ neuropathy, migraines presented to ED with acute weakness this morning in the context of extremity numbness x2 days. Patient noted "tingling" sensation throughout all 4 extremities for the past 2 days prior to ED visit. LNW was on 8/12/2021 at 11 AM.  Code stroke was called on 8/14/21 at 11:20 AM for R sided weakness. According to daughter, 2 days ago pt first had tingling and a headache, yesterday headache worsened, this morning she had motor deficits when she woke up. Pt was reported to have intermittent episodes of slurred speech. Imaging was negative for acute findings. Neurological exam revealed right arm drift to bed, right leg no effort against gravity, mild to moderate sensory loss on right arm/leg. Decreased strength in right arm and right leg.     NIHSS 6 (right arm drift to bed, right leg no effort against gravity, mild to moderate sensory loss on right arm/leg)  preMRS: 0    Impression: Decreased strength in right arm and right leg,  mild to moderate sensory loss to light touch on right arm/leg may be 2/2 to possible CVA vs. HTN encephalopathy vs. metabolic/infectious causes vs. unknown etiology    Recommendations:   [] MRI brain w/o contrast  [] Permissive HTN to 220/110.  []NPO until dysphagia screen passed, if doesn't pass speech/swallow eval  []PT/OT eval  []May start baby aspirin daily and 80 mg atorvastatin daily.  []Fall precautions, aspiration precautions (bed to 30 degrees)  [] metabolic/infectious work up as per primary team  [] UA, TSH, T4, B12, folic acid level  [] pain management as per primary team    Case to be discussed with Dr. Souza.

## 2021-08-14 NOTE — H&P ADULT - PROBLEM SELECTOR PLAN 2
- Patient not on any prescribed home medications  - insulin coverage sliding scale   - FS AC & HS when eating, Q 6 hrs when NPO.  - Diet: Consistent Carbs when able to take PO  - HbA1c is 7.2

## 2021-08-14 NOTE — ED PROVIDER NOTE - NS ED ROS FT
REVIEW OF SYSTEMS:    CONSTITUTIONAL: Endorses weakness. No fevers or chills  EYES/ENT: No visual changes;  No vertigo or throat pain   NECK: Endorses neck pain  RESPIRATORY: No cough, wheezing, hemoptysis; No shortness of breath  CARDIOVASCULAR: No chest pain or palpitations  GASTROINTESTINAL: No abdominal or epigastric pain. No nausea, vomiting, or hematemesis; No diarrhea or constipation. No melena or hematochezia.  GENITOURINARY: No dysuria, frequency or hematuria  NEUROLOGICAL: Endorses numbness b/l LE. Endorses generalized weakness, worse on R compated to L  SKIN: No itching, burning, rashes, or lesions   All other review of systems is negative unless indicated above.

## 2021-08-14 NOTE — ED PROVIDER NOTE - PHYSICAL EXAMINATION
VITALS:     Vital Signs Last 24 Hrs  T(C): 36.8 (14 Aug 2021 10:34), Max: 36.8 (14 Aug 2021 10:34)  T(F): 98.3 (14 Aug 2021 10:34), Max: 98.3 (14 Aug 2021 10:34)  HR: 98 (14 Aug 2021 10:34) (98 - 98)  BP: 158/118 (14 Aug 2021 10:34) (158/118 - 158/118)  RR: 16 (14 Aug 2021 10:34) (16 - 16)  SpO2: 100% (14 Aug 2021 10:34) (100% - 100%)    GENERAL: appears uncomfortable, lying in bed  HEAD:  Atraumatic, Normocephalic  EYES: EOMI, PERRLA, conjunctiva and sclera clear  ENT: Moist mucous membranes  NECK: Supple, pain with flexion  CHEST/LUNG: Clear to auscultation bilaterally; No rales, rhonchi, wheezing, or rubs. Unlabored respirations  HEART: Tachycardic. Regular rhythm; No murmurs, rubs, or gallops  ABDOMEN: Bowel sounds present; Soft, Nontender, Nondistended.   EXTREMITIES:  2+ Peripheral Pulses, brisk capillary refill. No clubbing, cyanosis, or edema  NERVOUS SYSTEM:  Alert & Oriented X3, speech clear. no nasal-labial fold flatenning. 0/5 strength RUE/RLE. preserved strength of LUE. drifting on LLE. decreased sensation on right face/UE/LE  MSK: limited effort on RUE/RLE.   SKIN: No rashes or lesions

## 2021-08-14 NOTE — ED ADULT NURSE NOTE - NSIMPLEMENTINTERV_GEN_ALL_ED
Implemented All Fall Risk Interventions:  [place] to call system. Call bell, personal items and telephone within reach. Instruct patient to call for assistance. Room bathroom lighting operational. Non-slip footwear when patient is off stretcher. Physically safe environment: no spills, clutter or unnecessary equipment. Stretcher in lowest position, wheels locked, appropriate side rails in place. Provide visual cue, wrist band, yellow gown, etc. Monitor gait and stability. Monitor for mental status changes and reorient to person, place, and time. Review medications for side effects contributing to fall risk. Reinforce activity limits and safety measures with patient and family.

## 2021-08-14 NOTE — ED PROVIDER NOTE - IV ALTEPLASE INCLUSION HIDDEN
show 89 yo MDS ringed sideroblasts IPSS-R Intermediate refractory to LESLI admitted with weakness likely related to anemia and probable UTI.

## 2021-08-15 LAB
ALBUMIN SERPL ELPH-MCNC: 4.7 G/DL — SIGNIFICANT CHANGE UP (ref 3.3–5)
ALP SERPL-CCNC: 92 U/L — SIGNIFICANT CHANGE UP (ref 40–120)
ALT FLD-CCNC: 22 U/L — SIGNIFICANT CHANGE UP (ref 4–33)
ANION GAP SERPL CALC-SCNC: 15 MMOL/L — HIGH (ref 7–14)
AST SERPL-CCNC: 20 U/L — SIGNIFICANT CHANGE UP (ref 4–32)
BILIRUB SERPL-MCNC: 0.7 MG/DL — SIGNIFICANT CHANGE UP (ref 0.2–1.2)
BUN SERPL-MCNC: 14 MG/DL — SIGNIFICANT CHANGE UP (ref 7–23)
CALCIUM SERPL-MCNC: 9.9 MG/DL — SIGNIFICANT CHANGE UP (ref 8.4–10.5)
CHLORIDE SERPL-SCNC: 104 MMOL/L — SIGNIFICANT CHANGE UP (ref 98–107)
CHOLEST SERPL-MCNC: 170 MG/DL — SIGNIFICANT CHANGE UP
CO2 SERPL-SCNC: 22 MMOL/L — SIGNIFICANT CHANGE UP (ref 22–31)
COVID-19 SPIKE DOMAIN AB INTERP: POSITIVE
COVID-19 SPIKE DOMAIN ANTIBODY RESULT: >250 U/ML — HIGH
CREAT SERPL-MCNC: 0.85 MG/DL — SIGNIFICANT CHANGE UP (ref 0.5–1.3)
GLUCOSE BLDC GLUCOMTR-MCNC: 114 MG/DL — HIGH (ref 70–99)
GLUCOSE BLDC GLUCOMTR-MCNC: 115 MG/DL — HIGH (ref 70–99)
GLUCOSE BLDC GLUCOMTR-MCNC: 115 MG/DL — HIGH (ref 70–99)
GLUCOSE BLDC GLUCOMTR-MCNC: 122 MG/DL — HIGH (ref 70–99)
GLUCOSE BLDC GLUCOMTR-MCNC: 142 MG/DL — HIGH (ref 70–99)
GLUCOSE SERPL-MCNC: 144 MG/DL — HIGH (ref 70–99)
HCT VFR BLD CALC: 44.2 % — SIGNIFICANT CHANGE UP (ref 34.5–45)
HDLC SERPL-MCNC: 57 MG/DL — SIGNIFICANT CHANGE UP
HGB BLD-MCNC: 14.3 G/DL — SIGNIFICANT CHANGE UP (ref 11.5–15.5)
LIPID PNL WITH DIRECT LDL SERPL: 85 MG/DL — SIGNIFICANT CHANGE UP
MAGNESIUM SERPL-MCNC: 2.3 MG/DL — SIGNIFICANT CHANGE UP (ref 1.6–2.6)
MCHC RBC-ENTMCNC: 28.5 PG — SIGNIFICANT CHANGE UP (ref 27–34)
MCHC RBC-ENTMCNC: 32.4 GM/DL — SIGNIFICANT CHANGE UP (ref 32–36)
MCV RBC AUTO: 88 FL — SIGNIFICANT CHANGE UP (ref 80–100)
NON HDL CHOLESTEROL: 113 MG/DL — SIGNIFICANT CHANGE UP
NRBC # BLD: 0 /100 WBCS — SIGNIFICANT CHANGE UP
NRBC # FLD: 0 K/UL — SIGNIFICANT CHANGE UP
PHOSPHATE SERPL-MCNC: 5 MG/DL — HIGH (ref 2.5–4.5)
PLATELET # BLD AUTO: 308 K/UL — SIGNIFICANT CHANGE UP (ref 150–400)
POTASSIUM SERPL-MCNC: 3.6 MMOL/L — SIGNIFICANT CHANGE UP (ref 3.5–5.3)
POTASSIUM SERPL-SCNC: 3.6 MMOL/L — SIGNIFICANT CHANGE UP (ref 3.5–5.3)
PROT SERPL-MCNC: 7.2 G/DL — SIGNIFICANT CHANGE UP (ref 6–8.3)
RBC # BLD: 5.02 M/UL — SIGNIFICANT CHANGE UP (ref 3.8–5.2)
RBC # FLD: 13.7 % — SIGNIFICANT CHANGE UP (ref 10.3–14.5)
SARS-COV-2 IGG+IGM SERPL QL IA: >250 U/ML — HIGH
SARS-COV-2 IGG+IGM SERPL QL IA: POSITIVE
SODIUM SERPL-SCNC: 141 MMOL/L — SIGNIFICANT CHANGE UP (ref 135–145)
T4 AB SER-ACNC: 8.93 UG/DL — SIGNIFICANT CHANGE UP (ref 5.1–13)
TRIGL SERPL-MCNC: 139 MG/DL — SIGNIFICANT CHANGE UP
TSH SERPL-MCNC: 1.13 UIU/ML — SIGNIFICANT CHANGE UP (ref 0.27–4.2)
WBC # BLD: 6.51 K/UL — SIGNIFICANT CHANGE UP (ref 3.8–10.5)
WBC # FLD AUTO: 6.51 K/UL — SIGNIFICANT CHANGE UP (ref 3.8–10.5)

## 2021-08-15 PROCEDURE — 70551 MRI BRAIN STEM W/O DYE: CPT | Mod: 26

## 2021-08-15 RX ORDER — SODIUM CHLORIDE 9 MG/ML
1000 INJECTION, SOLUTION INTRAVENOUS
Refills: 0 | Status: DISCONTINUED | OUTPATIENT
Start: 2021-08-15 | End: 2021-08-19

## 2021-08-15 RX ORDER — ACETAMINOPHEN 500 MG
1000 TABLET ORAL ONCE
Refills: 0 | Status: COMPLETED | OUTPATIENT
Start: 2021-08-15 | End: 2021-08-15

## 2021-08-15 RX ADMIN — Medication 1000 MILLIGRAM(S): at 07:30

## 2021-08-15 RX ADMIN — SODIUM CHLORIDE 60 MILLILITER(S): 9 INJECTION, SOLUTION INTRAVENOUS at 14:50

## 2021-08-15 RX ADMIN — Medication 400 MILLIGRAM(S): at 00:35

## 2021-08-15 RX ADMIN — SODIUM CHLORIDE 60 MILLILITER(S): 9 INJECTION, SOLUTION INTRAVENOUS at 21:32

## 2021-08-15 RX ADMIN — PANTOPRAZOLE SODIUM 40 MILLIGRAM(S): 20 TABLET, DELAYED RELEASE ORAL at 06:36

## 2021-08-15 RX ADMIN — Medication 1000 MILLIGRAM(S): at 23:00

## 2021-08-15 RX ADMIN — Medication 400 MILLIGRAM(S): at 22:15

## 2021-08-15 RX ADMIN — Medication 400 MILLIGRAM(S): at 06:36

## 2021-08-15 NOTE — SWALLOW BEDSIDE ASSESSMENT ADULT - SWALLOW EVAL: RECOMMENDED DIET
1. Oral nutrition/hydration is contraindicated at this time. 2. Consider ST alternative means of nutrition as pt is at increased nutritional risk.

## 2021-08-15 NOTE — SWALLOW BEDSIDE ASSESSMENT ADULT - PHARYNGEAL PHASE
Delayed pharyngeal swallow/Decreased laryngeal elevation/Wet vocal quality post oral intake/Cough post oral intake

## 2021-08-15 NOTE — SPEECH LANGUAGE PATHOLOGY EVALUATION - COMMENTS
Pt received awake and alert semi-supine in bed with daughter at bedside who served as a reliable informant and assisted with Danish translation. Pt spoke in low volume, however slightly able to project voice to be understood upon clinician cue. Pt with supplemental O2 at time of evaluation (2 L via nasal canula).     As per Neurology note 8/14/21,  "Pt is a 49 yo F (Danish speaking, translation provided by daughter at bedside) PMH DM2, HLD, B12 deficiency w/ neuropathy, migraines presented to ED with acute weakness this morning in the context of extremity numbness x2 days. Patient noted "tingling" sensation throughout all 4 extremities for the past 2 days prior to ED visit. LNW was on 8/12/2021 at 11 AM.  Code stroke was called on 8/14/21 at 11:20 AM for R sided weakness. According to daughter, 2 days ago pt first had tingling and a headache, yesterday headache worsened, this morning she had motor deficits when she woke up. Pt was reported to have intermittent episodes of slurred speech.     CXR chest 8/14/21 revealed "clear lungs".

## 2021-08-15 NOTE — PHYSICAL THERAPY INITIAL EVALUATION ADULT - PERTINENT HX OF CURRENT PROBLEM, REHAB EVAL
patient presents with right sided weakness / numbness. PMH includes 2DM not on prescribe medications, HLD, B12 deficiency with neuropathy, migraines

## 2021-08-15 NOTE — OCCUPATIONAL THERAPY INITIAL EVALUATION ADULT - PERTINENT HX OF CURRENT PROBLEM, REHAB EVAL
51 y/o female (Malay speaking, translation provided by daughter at bedside) with PMH of T2DM not on prescribe medications, HLD, B12 deficiency with neuropathy, migraines presented to ED with acute weakness, tingling of upper and lower extremities, right > left. Admitted for CVA management. Pt is a 49 y/o female (Frisian speaking, translation provided by daughter at bedside) with PMH of T2DM not on prescribe medications, HLD, B12 deficiency with neuropathy, migraines presented to ED with acute weakness, tingling of upper and lower extremities, right > left. Admitted for CVA management.

## 2021-08-15 NOTE — OCCUPATIONAL THERAPY INITIAL EVALUATION ADULT - RANGE OF MOTION EXAMINATION, UPPER EXTREMITY
limited active ROM t/o the RUE with decreased effort 2/3 full ROM/Left UE Active ROM was WFL (within functional limits)/Right UE Active Assistive ROM was WFL  (within functional limits)

## 2021-08-15 NOTE — SWALLOW BEDSIDE ASSESSMENT ADULT - SWALLOW EVAL: DIAGNOSIS
1. Functional oral management provided thin liquids, nectar thickened liquids and honey thickened liquids marked by adequate bolus collection, transit and AP transport. 2. Pharyngeal stage marked by suspect delayed initiation of the pharyngeal swallow trigger and reduced hyolaryngeal excursion marked by immediate cough response and change in vocal quality to 'wet' tone for all trials, possibly indicative of aspiration/penetration. Puree texture refused by pt despite maximum encouragement from SLP and daughter.

## 2021-08-15 NOTE — SWALLOW BEDSIDE ASSESSMENT ADULT - COMMENTS
Pt received awake and alert semi-supine in bed with daughter at bedside who served as a reliable informant and assisted with Korean translation. Pt spoke in low volume, however slightly able to project voice to be understood upon clinician cue. Pt with supplemental O2 at time of evaluation (2 L via nasal canula). Of note, pt reports odynophagia, difficulty swallowing and feeling of 'tightness', pointing to her throat and chest when she swallows.     As per Neurology note 8/14/21,  "Pt is a 51 yo F (Korean speaking, translation provided by daughter at bedside) PMH DM2, HLD, B12 deficiency w/ neuropathy, migraines presented to ED with acute weakness this morning in the context of extremity numbness x2 days. Patient noted "tingling" sensation throughout all 4 extremities for the past 2 days prior to ED visit. LNW was on 8/12/2021 at 11 AM.  Code stroke was called on 8/14/21 at 11:20 AM for R sided weakness. According to daughter, 2 days ago pt first had tingling and a headache, yesterday headache worsened, this morning she had motor deficits when she woke up. Pt was reported to have intermittent episodes of slurred speech.     CXR chest 8/14/21 revealed "clear lungs".

## 2021-08-15 NOTE — SWALLOW BEDSIDE ASSESSMENT ADULT - CONSISTENCIES ADMINISTERED
Further textures not trialed due to patient refusal despite maximum clinician encouragement./thin liquid/honey thick/nectar thick

## 2021-08-15 NOTE — SWALLOW BEDSIDE ASSESSMENT ADULT - ASR SWALLOW RECOMMEND DIAG
Cineesophagogram is warranted due to overt s/sx of aspiration/penetration at bedside and pt complaints of difficulty swallowing and odynophagia./VFSS/MBS

## 2021-08-15 NOTE — SPEECH LANGUAGE PATHOLOGY EVALUATION - SLP DIAGNOSIS
1. Receptive language deficits not evident as patient demonstrated ability to answer 1 step directives and answer simple yes/ no questions independently. 2. Expressive language deficits not evident as pt demonstrated ability to complete responsive and confrontational naming tasks, independently. 3. Functional motor speech observed as pt observed with fluent, intelligible speech given ability to particpate in conversational task with clinician. Vocal quality reduced, however pt able to project voice to be understood when provided cue from clinician.

## 2021-08-15 NOTE — PHYSICAL THERAPY INITIAL EVALUATION ADULT - ADDITIONAL COMMENTS
patient with right sided numbness / decreased sensation from T1 dermatome to bottom of feet. left side WFL    patient with possible poor effort throughout exam as well

## 2021-08-15 NOTE — CHART NOTE - NSCHARTNOTEFT_GEN_A_CORE
Medicine Subsequent Hospital Care Note- ACP  CC: Chest pain 9/10    HPI/Subjective: Pt c/o chest pain 9/10, diffuse, no SOB or palpitations, lying in bed    ROS:    Constitutional: Denies fever, chills, diaphoresis , malaise, night sweat, generalized weakness.   HEENT: Denies blurry vision, difficulty hearing.  Respiratory: Denies SOB, GOMEZ, cough, sputum production, wheezing, hemoptysis.  Cardiovascular: Denies SOB palpitations, diaphoresis, lightheadedness, dizziness, syncope, edema.  Gastrointestinal: Denies nausea, vomiting, diarrhea, constipation, abdominal pain, melena, hematochezia, dysphagia.  Genitourinary: Denies dysuria, frequency, urgency, hematuria, nocturia.  Skin/Breast: Denies rash, itching, ulcers, erythema, peripheral edema.  Musculoskeletal: Denies myalgias, joint swelling, muscle weakness.  Neurologic: paresthesias and numbness/tingling, paralysis on righ side, Denies headache, dizziness, visual changes, ataxia.  Psychiatric: Denies feeling anxious, depressed, suicidal, homicidal thoughts.  Hematology/Lymphatic/Oncology: Denies bruising, tender or enlarged lymph nodes.   Endocrine: Denies polyuria, polydipsia, polyphagia.     -------------------------------------------------------------------------------------------------------------------------  Vital Signs:  Vital Signs Last 24 Hrs  T(C): 36.9 (08-14-21 @ 22:50), Max: 37 (08-14-21 @ 15:00)  T(F): 98.5 (08-14-21 @ 22:50), Max: 98.6 (08-14-21 @ 15:00)  HR: 79 (08-14-21 @ 22:50) (77 - 98)  BP: 128/82 (08-14-21 @ 22:50) (126/79 - 158/118)  RR: 18 (08-14-21 @ 22:50) (15 - 18)  SpO2: 100% (08-14-21 @ 22:50) (99% - 100%) on (O2)    Telemetry/Alarms:  General: WN/WD NAD  Neurology: Awake, nonfocal, MCFARLANE x 4  Eyes: Scleras clear, PERRLA/ EOMI, Gross vision intact  ENT:Gross hearing intact, grossly patent pharynx, no stridor  Neck: Neck supple, trachea midline, No JVD,   Respiratory: CTA B/L, No wheezing, rales, rhonchi  CV: RRR, S1S2, no murmurs, rubs or gallops  Abdominal: Soft, NT, ND +BS,   Extremities: No edema, + peripheral pulses  Skin: No Rashes, Hematoma, Ecchymosis  Psych: Oriented x 3, flat, depressed affect    Relevant labs, radiology and Medications reviewed                        14.7   6.88  )-----------( 325      ( 14 Aug 2021 11:32 )             45.5     08-14    142  |  103  |  11  ----------------------------<  153<H>  4.1   |  23  |  0.72    Ca    9.8      14 Aug 2021 11:32    TPro  7.9  /  Alb  5.1<H>  /  TBili  0.5  /  DBili  x   /  AST  21  /  ALT  26  /  AlkPhos  107  08-14    PT/INR - ( 14 Aug 2021 11:32 )   PT: 11.9 sec;   INR: 1.04 ratio         PTT - ( 14 Aug 2021 11:32 )  PTT:35.2 sec  MEDICATIONS  (STANDING):  acetaminophen  IVPB .. 1000 milliGRAM(s) IV Intermittent once  aspirin  chewable 81 milliGRAM(s) Oral daily  atorvastatin 80 milliGRAM(s) Oral at bedtime  dextrose 40% Gel 15 Gram(s) Oral once  dextrose 5%. 1000 milliLiter(s) (50 mL/Hr) IV Continuous <Continuous>  dextrose 5%. 1000 milliLiter(s) (100 mL/Hr) IV Continuous <Continuous>  dextrose 50% Injectable 25 Gram(s) IV Push once  dextrose 50% Injectable 12.5 Gram(s) IV Push once  dextrose 50% Injectable 25 Gram(s) IV Push once  glucagon  Injectable 1 milliGRAM(s) IntraMuscular once  insulin lispro (ADMELOG) corrective regimen sliding scale   SubCutaneous three times a day before meals  insulin lispro (ADMELOG) corrective regimen sliding scale   SubCutaneous at bedtime  pantoprazole  Injectable 40 milliGRAM(s) IV Push daily    MEDICATIONS  (PRN):  acetaminophen   Tablet .. 650 milliGRAM(s) Oral every 6 hours PRN Temp greater or equal to 38C (100.4F), Mild Pain (1 - 3), Moderate Pain (4 - 6)    Pertinent Physical Exam  I&O's Summary    Marital Status:  (   )    (   ) Single    (   )    (  )   Lives with: (  ) alone  (  ) children   (  ) spouse   (  ) parents  (  ) other  Recent Travel: No recent travel  Occupation:    Substance Use (street drugs): ( x ) never used  (  ) other:  Tobacco Usage:  ( x  ) never smoked   (   ) former smoker   (   ) current smoker  (     ) pack year  Alcohol Usage: None     I reviewed the above lab results, tests, telemetry, and  EKG interpretation.   Assessment  50y Female  w/ PAST MEDICAL & SURGICAL HISTORY:  DM (diabetes mellitus)    HLD (hyperlipidemia)    Migraine    No significant past surgical history    Patient is a 50y old  Female who has had a CVA and now c/o chest pain 9/10    PLAN  1)EKG 78bpm, similar to previous with flattened Twave now in v1, poor wave progression in v2-3  2)O2 NC for comfort  3)SL NTG given x 1 with very little relief, no further given so as not to drop her BP in the setting of her new CVA  4) STAT CE sent, were Neg  5) IV Protonix now then QD  6) Position change in bed and over time pain decreased to 5/5, IV Tylenol and continue to monitor.      Neuro: Pain management  Pulm: Encourage coughing, deep breathing and use of incentive spirometry. Wean off supplemental oxygen as able.   Cardio: Monitor telemetry/alarms  GI: NPO until S&S passed, failed dysphagia screen  Renal: monitor urine output, supplement electrolytes as needed  Vasc: Heparin SC/SCDs for DVT prophylaxis  Heme: Stable H/H. .   ID: no issues  Therapy: PT OT ordered.    Disposition: Full Code.         [ ] Moderate complexity/risk, [ x] High Complexity/Risk (Time > 60 min)

## 2021-08-16 LAB
APPEARANCE UR: CLEAR — SIGNIFICANT CHANGE UP
BILIRUB UR-MCNC: NEGATIVE — SIGNIFICANT CHANGE UP
COLOR SPEC: YELLOW — SIGNIFICANT CHANGE UP
DIFF PNL FLD: NEGATIVE — SIGNIFICANT CHANGE UP
GLUCOSE BLDC GLUCOMTR-MCNC: 114 MG/DL — HIGH (ref 70–99)
GLUCOSE BLDC GLUCOMTR-MCNC: 119 MG/DL — HIGH (ref 70–99)
GLUCOSE BLDC GLUCOMTR-MCNC: 123 MG/DL — HIGH (ref 70–99)
GLUCOSE BLDC GLUCOMTR-MCNC: 145 MG/DL — HIGH (ref 70–99)
GLUCOSE BLDC GLUCOMTR-MCNC: 148 MG/DL — HIGH (ref 70–99)
GLUCOSE UR QL: ABNORMAL
KETONES UR-MCNC: ABNORMAL
LEUKOCYTE ESTERASE UR-ACNC: ABNORMAL
NITRITE UR-MCNC: NEGATIVE — SIGNIFICANT CHANGE UP
PH UR: 6 — SIGNIFICANT CHANGE UP (ref 5–8)
PROT UR-MCNC: ABNORMAL
SP GR SPEC: 1.03 — HIGH (ref 1.01–1.02)
UROBILINOGEN FLD QL: SIGNIFICANT CHANGE UP

## 2021-08-16 PROCEDURE — 31575 DIAGNOSTIC LARYNGOSCOPY: CPT

## 2021-08-16 PROCEDURE — 99232 SBSQ HOSP IP/OBS MODERATE 35: CPT | Mod: 25

## 2021-08-16 PROCEDURE — 74230 X-RAY XM SWLNG FUNCJ C+: CPT | Mod: 26

## 2021-08-16 PROCEDURE — 99253 IP/OBS CNSLTJ NEW/EST LOW 45: CPT

## 2021-08-16 RX ORDER — ACETAMINOPHEN 500 MG
650 TABLET ORAL ONCE
Refills: 0 | Status: DISCONTINUED | OUTPATIENT
Start: 2021-08-16 | End: 2021-08-16

## 2021-08-16 RX ORDER — ACETAMINOPHEN 500 MG
1000 TABLET ORAL ONCE
Refills: 0 | Status: COMPLETED | OUTPATIENT
Start: 2021-08-16 | End: 2021-08-16

## 2021-08-16 RX ADMIN — Medication 400 MILLIGRAM(S): at 22:22

## 2021-08-16 RX ADMIN — PANTOPRAZOLE SODIUM 40 MILLIGRAM(S): 20 TABLET, DELAYED RELEASE ORAL at 10:10

## 2021-08-16 RX ADMIN — SODIUM CHLORIDE 60 MILLILITER(S): 9 INJECTION, SOLUTION INTRAVENOUS at 22:22

## 2021-08-16 RX ADMIN — Medication 1000 MILLIGRAM(S): at 22:37

## 2021-08-16 RX ADMIN — SODIUM CHLORIDE 60 MILLILITER(S): 9 INJECTION, SOLUTION INTRAVENOUS at 06:31

## 2021-08-16 RX ADMIN — SODIUM CHLORIDE 60 MILLILITER(S): 9 INJECTION, SOLUTION INTRAVENOUS at 10:10

## 2021-08-16 NOTE — SWALLOW VFSS/MBS ASSESSMENT ADULT - COMMENTS
Pt received awake, alert and cooperative in Radiology suite for a cineesophagogram this AM. Language Line Bulgarian  offered, (ID number [id number]) however pt declined. Full report to follow.     As per Neurology note 8/14/21,  "Pt is a 51 yo F (Bulgarian speaking, translation provided by daughter at bedside) PMH DM2, HLD, B12 deficiency w/ neuropathy, migraines presented to ED with acute weakness this morning in the context of extremity numbness x2 days. Patient noted "tingling" sensation throughout all 4 extremities for the past 2 days prior to ED visit. LNW was on 8/12/2021 at 11 AM.  Code stroke was called on 8/14/21 at 11:20 AM for R sided weakness. According to daughter, 2 days ago pt first had tingling and a headache, yesterday headache worsened, this morning she had motor deficits when she woke up. Pt was reported to have intermittent episodes of slurred speech.    MRI head on 8/15/21 revealed: 'No acute hemorrhage mass or mass effect.'    CXR chest 8/14/21/ revealed: 'clear lungs' Pt received awake, alert and cooperative in Radiology suite for a cineesophagogram this AM. Language Line Yoruba  offered, (ID number [id number]) however pt declined. Of note, clinical assessment of swallow function completed yesterday (see full report for details) and reported difficulty swallowing and odynophagia.     As per Neurology note 8/14/21,  "Pt is a 49 yo F (Yoruba speaking, translation provided by daughter at bedside) PMH DM2, HLD, B12 deficiency w/ neuropathy, migraines presented to ED with acute weakness this morning in the context of extremity numbness x2 days. Patient noted "tingling" sensation throughout all 4 extremities for the past 2 days prior to ED visit. LNW was on 8/12/2021 at 11 AM.  Code stroke was called on 8/14/21 at 11:20 AM for R sided weakness. According to daughter, 2 days ago pt first had tingling and a headache, yesterday headache worsened, this morning she had motor deficits when she woke up. Pt was reported to have intermittent episodes of slurred speech.    MRI head on 8/15/21 revealed: 'No acute hemorrhage mass or mass effect.'    CXR chest 8/14/21/ revealed: 'clear lungs'

## 2021-08-16 NOTE — SWALLOW VFSS/MBS ASSESSMENT ADULT - ADDITIONAL RECOMMENDATIONS
GI consult warranted due to complaints of burning sensation and 'tightness' along esophagus and coughing in the absence of contrast in the airway. GI consult warranted due to complaints of burning sensation and 'tightness' , pointing to esophagus and coughing in the absence of contrast in the airway. 1. GI consult warranted due to complaints of burning sensation and 'tightness' , pointing to esophagus and coughing in the absence of contrast in the airway.  2. Please re-consult this service as necessary

## 2021-08-16 NOTE — SWALLOW VFSS/MBS ASSESSMENT ADULT - ORAL PREP COMMENTS
Pt with prolonged, yet functional oral preparation and indicated 'weakness' in oral cavity when masticating due to decreased PO intake during current hospital admission. Pt indicated 'weakness' in oral cavity when masticating due to decreased PO intake during current hospital admission.

## 2021-08-16 NOTE — SWALLOW VFSS/MBS ASSESSMENT ADULT - RECOMMENDED CONSISTENCY
1. Soft solid with thin liquids, as tolerated.   2. Monitor for any evolving changes that may impact oral intake 1. Soft solid with thin liquids, as tolerated.

## 2021-08-16 NOTE — SWALLOW VFSS/MBS ASSESSMENT ADULT - PHARYNGEAL PHASE COMMENTS
Pt observed with cough prior to initiation of pharyngeal swallow in the absence of contrast entering the airway.

## 2021-08-16 NOTE — SWALLOW VFSS/MBS ASSESSMENT ADULT - ORAL PHASE COMMENTS
Piecemeal transport resulting in 2 swallows. Trace residue lining lingual surface though cleared with clinician cue to initiate subsequent saliva swallow.

## 2021-08-16 NOTE — CONSULT NOTE ADULT - SUBJECTIVE AND OBJECTIVE BOX
HPI:  Patient is a 50y Female with PMH of T2DM not on prescribe medications, HLD, B12 deficiency with neuropathy, migraines presented to ED with acute weakness, tingling of upper and lower extremities, right > left. Pt being worked up for etiology of these sx. Pt also complaining of throat pain, which she states started when the sx of weakness began as well. Pt with hx of GERD, not on any medications. Pt states that she usually gets these sx after eating spicy foods but since being admitted to the hospital, has been more painful. Pt denies any airway-related sx including voice change, stridor, coughing/choking with food. Pt initially failed bedside swallow but underwent MBS which did not show any signs of aspiration. SLP recommended soft diet and thin liquids. ENT consulted to rule out other etiology of odynophagia.       Physical Exam  T(C): 37 (08-16-21 @ 17:45), Max: 37.1 (08-15-21 @ 20:45)  HR: 70 (08-16-21 @ 17:45) (65 - 77)  BP: 124/64 (08-16-21 @ 17:45) (111/70 - 124/64)  RR: 17 (08-16-21 @ 17:45) (17 - 18)  SpO2: 100% (08-16-21 @ 17:45) (97% - 100%)    General: NAD, A+Ox3  No respiratory distress, stridor, or stertor  Voice quality: normal  Face:  Symmetric without masses or lesions  OU: PERRL, EOMI  AD: Pinna wnl, EAC clear, TM intact, no effusion  AS: Pinna wnl, EAC clear, TM intact, no effusion  Nose: nasal cavity clear bilaterally, inferior turbinates normal, mucosa normal without crusting or bleeding  OC/OP: tongue normal, floor of mouth wnl, no masses or lesions, OP clear  Neck: soft/flat, no LAD  CNII-XII intact    Procedure: Flexible laryngoscopy    Pre-procedure diagnosis/Indication for procedure: To evaluate larynx    Anesthesia: None    Description:    A flexible endoscope was used to examine the left and right nasal cavities, posterior oropharynx, hypopharynx, and larynx. The nasal valve areas were examined for abnormalities or collapse. The inferior and middle turbinates were evaluated. The middle and superior meatuses, the sphenoethmoid recesses, and the nasopharynx were examined and inspected for mucopurulence, polyps, and nasal masses. The oropharynx, tongue base/vallecula, epiglottis, aryepiglottic folds, arytenoids, vocal cords, hypopharynx were also inspected for swelling, inflammation, or dysfunction. The patient tolerated the procedure without complications.    Findings:    NP wnl  BOT/vallecula normal  Epiglottis sharp  AE folds nonedematous  Arytenoids mobile  Vocal folds mobile bilaterally  No masses or lesions visualized in post cricoid space or pyriform sinuses bilaterally  Small amount of post-cricoid edema       Assessment and Plan"   50y year old Female with known hx of GERD, admitted for weakness of unknown etiology presenting with complaints of odynophagia. Pt with no signs of aspiration on MNBS and no airway sx. Flexible laryngology with small amount of post-cricoid edema on exam. Throat pain/odynophagia may be caused by GERD.   - No acute ENT intervention indicated at this time   - Pt should adhere to conservative measures to avoid reflux. Avoid foods that may cause reflux such as spicy foods, dairy, tomato   - Head of bed elevated  - No meals late at night or close to bedtime   - Can consider PPI if conservative measures do not help   - Follow up with GI outpt if sx persist  - Paged with questions

## 2021-08-16 NOTE — CHART NOTE - NSCHARTNOTEFT_GEN_A_CORE
MRI brain reviewed, no acute pathology or infarct noted. Unclear etiology of R sided weakness. Initial presentation seem to note complaint of numbness more than weakness. Would clarify hx and whether patient has this at baseline. If not then consider MR c-spine as it would be the only other location to cause RUE and RLE weakness. PT notes deconditioning. Would continue PT/OT. Continue ASA and atorvastatin.

## 2021-08-16 NOTE — SWALLOW VFSS/MBS ASSESSMENT ADULT - DIAGNOSTIC IMPRESSIONS
1. Functional oral management given mechanical soft solid and soft solid marked by adequate bolus collection, transit and transport. Of note, pt indicated 'weakness' in oral cavity when masticating secondary to reduced PO intake during current hospital admission. 2. Piecemeal transport characterized by 2 swallows given thin liquid trials. Mild lingual stasis lining lingual surface though cleared with clinician cue to initiate subsequent saliva swallow. 3. Pharyngeal stage 1. Functional oral management given puree, mechanical soft and soft solid marked by adequate bolus collection, transit and transport. Of note, pt indicated 'weakness' in oral cavity when masticating secondary to reduced PO intake during current hospital admission. 2. Piecemeal AP transport characterized by 2 swallows given thin liquid trials. Trace-mild lingual stasis lining lingual surface though cleared with clinician cue to initiate subsequent saliva swallow. 3. Pharyngeal stage marked by timely initiation of the pharyngeal swallow trigger, complete epiglottic deflection, adequate hyolaryngeal excursion and complete closure of the laryngeal vestibule given thin liquids, puree, mechanical soft, and soft solid texture. No penetration/aspiration viewed. It is important to note that the patient was observed to cough in the absence of contrast in the airway throughout the study. 1. Functional oral management given puree, mechanical soft and solid texture marked by adequate bolus collection, transit and transport. Slowed mastication noted with solid texture, though with ability to break down solid. 2. Piecemeal AP transport characterized by two swallows given thin liquid trials. Trace-mild lingual stasis lining lingual surface though cleared with clinician cue to initiate subsequent saliva swallow. 3. Pharyngeal stage marked by timely initiation of the pharyngeal swallow trigger, complete tongue base retraction, complete epiglottic deflection, adequate hyolaryngeal excursion, adequate pharyngeal contractibility, and complete closure of the laryngeal vestibule given thin liquids, puree, mechanical soft, and soft solid texture. No aspiration viewed.   Of note: Patient was observed to cough in the absence of contrast in the airway throughout the study.

## 2021-08-17 LAB
ANION GAP SERPL CALC-SCNC: 11 MMOL/L — SIGNIFICANT CHANGE UP (ref 7–14)
BUN SERPL-MCNC: 15 MG/DL — SIGNIFICANT CHANGE UP (ref 7–23)
CALCIUM SERPL-MCNC: 9.5 MG/DL — SIGNIFICANT CHANGE UP (ref 8.4–10.5)
CHLORIDE SERPL-SCNC: 108 MMOL/L — HIGH (ref 98–107)
CO2 SERPL-SCNC: 23 MMOL/L — SIGNIFICANT CHANGE UP (ref 22–31)
CREAT SERPL-MCNC: 0.67 MG/DL — SIGNIFICANT CHANGE UP (ref 0.5–1.3)
CULTURE RESULTS: SIGNIFICANT CHANGE UP
GLUCOSE BLDC GLUCOMTR-MCNC: 111 MG/DL — HIGH (ref 70–99)
GLUCOSE BLDC GLUCOMTR-MCNC: 114 MG/DL — HIGH (ref 70–99)
GLUCOSE BLDC GLUCOMTR-MCNC: 131 MG/DL — HIGH (ref 70–99)
GLUCOSE BLDC GLUCOMTR-MCNC: 151 MG/DL — HIGH (ref 70–99)
GLUCOSE SERPL-MCNC: 146 MG/DL — HIGH (ref 70–99)
HCT VFR BLD CALC: 41.2 % — SIGNIFICANT CHANGE UP (ref 34.5–45)
HGB BLD-MCNC: 13.5 G/DL — SIGNIFICANT CHANGE UP (ref 11.5–15.5)
MAGNESIUM SERPL-MCNC: 2.1 MG/DL — SIGNIFICANT CHANGE UP (ref 1.6–2.6)
MCHC RBC-ENTMCNC: 28.8 PG — SIGNIFICANT CHANGE UP (ref 27–34)
MCHC RBC-ENTMCNC: 32.8 GM/DL — SIGNIFICANT CHANGE UP (ref 32–36)
MCV RBC AUTO: 87.8 FL — SIGNIFICANT CHANGE UP (ref 80–100)
NRBC # BLD: 0 /100 WBCS — SIGNIFICANT CHANGE UP
NRBC # FLD: 0 K/UL — SIGNIFICANT CHANGE UP
PHOSPHATE SERPL-MCNC: 3.4 MG/DL — SIGNIFICANT CHANGE UP (ref 2.5–4.5)
PLATELET # BLD AUTO: 309 K/UL — SIGNIFICANT CHANGE UP (ref 150–400)
POTASSIUM SERPL-MCNC: 3.7 MMOL/L — SIGNIFICANT CHANGE UP (ref 3.5–5.3)
POTASSIUM SERPL-SCNC: 3.7 MMOL/L — SIGNIFICANT CHANGE UP (ref 3.5–5.3)
RBC # BLD: 4.69 M/UL — SIGNIFICANT CHANGE UP (ref 3.8–5.2)
RBC # FLD: 13.8 % — SIGNIFICANT CHANGE UP (ref 10.3–14.5)
SODIUM SERPL-SCNC: 142 MMOL/L — SIGNIFICANT CHANGE UP (ref 135–145)
SPECIMEN SOURCE: SIGNIFICANT CHANGE UP
WBC # BLD: 5.71 K/UL — SIGNIFICANT CHANGE UP (ref 3.8–10.5)
WBC # FLD AUTO: 5.71 K/UL — SIGNIFICANT CHANGE UP (ref 3.8–10.5)

## 2021-08-17 PROCEDURE — 72141 MRI NECK SPINE W/O DYE: CPT | Mod: 26

## 2021-08-17 PROCEDURE — 99232 SBSQ HOSP IP/OBS MODERATE 35: CPT

## 2021-08-17 RX ORDER — KETOROLAC TROMETHAMINE 30 MG/ML
15 SYRINGE (ML) INJECTION ONCE
Refills: 0 | Status: DISCONTINUED | OUTPATIENT
Start: 2021-08-17 | End: 2021-08-17

## 2021-08-17 RX ORDER — BENZOCAINE AND MENTHOL 5; 1 G/100ML; G/100ML
1 LIQUID ORAL EVERY 6 HOURS
Refills: 0 | Status: DISCONTINUED | OUTPATIENT
Start: 2021-08-17 | End: 2021-08-19

## 2021-08-17 RX ADMIN — BENZOCAINE AND MENTHOL 1 LOZENGE: 5; 1 LIQUID ORAL at 12:46

## 2021-08-17 RX ADMIN — SODIUM CHLORIDE 60 MILLILITER(S): 9 INJECTION, SOLUTION INTRAVENOUS at 22:06

## 2021-08-17 RX ADMIN — Medication 15 MILLIGRAM(S): at 18:06

## 2021-08-17 RX ADMIN — ATORVASTATIN CALCIUM 80 MILLIGRAM(S): 80 TABLET, FILM COATED ORAL at 22:06

## 2021-08-17 RX ADMIN — SODIUM CHLORIDE 60 MILLILITER(S): 9 INJECTION, SOLUTION INTRAVENOUS at 07:53

## 2021-08-17 RX ADMIN — Medication 81 MILLIGRAM(S): at 12:45

## 2021-08-17 RX ADMIN — Medication 1: at 08:38

## 2021-08-17 RX ADMIN — PANTOPRAZOLE SODIUM 40 MILLIGRAM(S): 20 TABLET, DELAYED RELEASE ORAL at 12:47

## 2021-08-17 RX ADMIN — Medication 15 MILLIGRAM(S): at 18:17

## 2021-08-18 LAB
ANION GAP SERPL CALC-SCNC: 12 MMOL/L — SIGNIFICANT CHANGE UP (ref 7–14)
BUN SERPL-MCNC: 11 MG/DL — SIGNIFICANT CHANGE UP (ref 7–23)
CALCIUM SERPL-MCNC: 9.1 MG/DL — SIGNIFICANT CHANGE UP (ref 8.4–10.5)
CHLORIDE SERPL-SCNC: 111 MMOL/L — HIGH (ref 98–107)
CO2 SERPL-SCNC: 20 MMOL/L — LOW (ref 22–31)
CREAT SERPL-MCNC: 0.59 MG/DL — SIGNIFICANT CHANGE UP (ref 0.5–1.3)
GLUCOSE BLDC GLUCOMTR-MCNC: 104 MG/DL — HIGH (ref 70–99)
GLUCOSE BLDC GLUCOMTR-MCNC: 143 MG/DL — HIGH (ref 70–99)
GLUCOSE BLDC GLUCOMTR-MCNC: 176 MG/DL — HIGH (ref 70–99)
GLUCOSE BLDC GLUCOMTR-MCNC: 216 MG/DL — HIGH (ref 70–99)
GLUCOSE SERPL-MCNC: 150 MG/DL — HIGH (ref 70–99)
HCT VFR BLD CALC: 40.1 % — SIGNIFICANT CHANGE UP (ref 34.5–45)
HGB BLD-MCNC: 13 G/DL — SIGNIFICANT CHANGE UP (ref 11.5–15.5)
MAGNESIUM SERPL-MCNC: 2 MG/DL — SIGNIFICANT CHANGE UP (ref 1.6–2.6)
MCHC RBC-ENTMCNC: 28.4 PG — SIGNIFICANT CHANGE UP (ref 27–34)
MCHC RBC-ENTMCNC: 32.4 GM/DL — SIGNIFICANT CHANGE UP (ref 32–36)
MCV RBC AUTO: 87.6 FL — SIGNIFICANT CHANGE UP (ref 80–100)
NRBC # BLD: 0 /100 WBCS — SIGNIFICANT CHANGE UP
NRBC # FLD: 0 K/UL — SIGNIFICANT CHANGE UP
PHOSPHATE SERPL-MCNC: 3.5 MG/DL — SIGNIFICANT CHANGE UP (ref 2.5–4.5)
PLATELET # BLD AUTO: 301 K/UL — SIGNIFICANT CHANGE UP (ref 150–400)
POTASSIUM SERPL-MCNC: 3.7 MMOL/L — SIGNIFICANT CHANGE UP (ref 3.5–5.3)
POTASSIUM SERPL-SCNC: 3.7 MMOL/L — SIGNIFICANT CHANGE UP (ref 3.5–5.3)
RBC # BLD: 4.58 M/UL — SIGNIFICANT CHANGE UP (ref 3.8–5.2)
RBC # FLD: 13.8 % — SIGNIFICANT CHANGE UP (ref 10.3–14.5)
SODIUM SERPL-SCNC: 143 MMOL/L — SIGNIFICANT CHANGE UP (ref 135–145)
WBC # BLD: 5.43 K/UL — SIGNIFICANT CHANGE UP (ref 3.8–10.5)
WBC # FLD AUTO: 5.43 K/UL — SIGNIFICANT CHANGE UP (ref 3.8–10.5)

## 2021-08-18 PROCEDURE — 99232 SBSQ HOSP IP/OBS MODERATE 35: CPT

## 2021-08-18 PROCEDURE — 93306 TTE W/DOPPLER COMPLETE: CPT | Mod: 26

## 2021-08-18 RX ORDER — METOCLOPRAMIDE HCL 10 MG
5 TABLET ORAL ONCE
Refills: 0 | Status: DISCONTINUED | OUTPATIENT
Start: 2021-08-18 | End: 2021-08-19

## 2021-08-18 RX ORDER — ACETAMINOPHEN 500 MG
1000 TABLET ORAL ONCE
Refills: 0 | Status: COMPLETED | OUTPATIENT
Start: 2021-08-18 | End: 2021-08-19

## 2021-08-18 RX ADMIN — SODIUM CHLORIDE 60 MILLILITER(S): 9 INJECTION, SOLUTION INTRAVENOUS at 11:20

## 2021-08-18 RX ADMIN — ATORVASTATIN CALCIUM 80 MILLIGRAM(S): 80 TABLET, FILM COATED ORAL at 22:55

## 2021-08-18 RX ADMIN — Medication 81 MILLIGRAM(S): at 11:21

## 2021-08-18 RX ADMIN — PANTOPRAZOLE SODIUM 40 MILLIGRAM(S): 20 TABLET, DELAYED RELEASE ORAL at 11:21

## 2021-08-18 RX ADMIN — Medication 1: at 11:59

## 2021-08-18 RX ADMIN — BENZOCAINE AND MENTHOL 1 LOZENGE: 5; 1 LIQUID ORAL at 11:21

## 2021-08-18 RX ADMIN — Medication 1 DROP(S): at 17:33

## 2021-08-18 RX ADMIN — SODIUM CHLORIDE 60 MILLILITER(S): 9 INJECTION, SOLUTION INTRAVENOUS at 22:55

## 2021-08-18 NOTE — PROGRESS NOTE ADULT - ASSESSMENT
49 y/o female (Mohawk speaking, translation provided by daughter at bedside) with PMH of T2DM not on prescribe medications, HLD, B12 deficiency with neuropathy, migraines presented to ED with acute weakness, tingling of upper and lower extremities, right > left. Admitted for CVA management.    Problem/Plan - :  ·  Problem: Rt UE/LE numbness  ·  Plan: - Monitor on telemetry  - House Neurology consulted and recommended :  - Permissive HTN up to 220/120 mmHg for first 24 to 48 hours after the symptom onset followed by gradual   normotension.  - MRI head w/o contrast 8/15/21 --> nonacute  - Echo with bubble study  - Cont ASA 81 mg PO daily and Atorvastatin 80 mg PO QHS  - Seizure, fall and aspiration precautions  - Elevate head of the bed to 30 degree  - Speech and swallow appreciated  - PT/OT consult.   - MRI C-spine without gadolinium ordered    Problem/Plan - :  ·  Problem: Dysphagia.  ·  Plan: Speech eval noted  - Appreciate ENT evaluation    Problem/Plan - :  ·  Problem: DM (diabetes mellitus).   ·  Plan: - Patient not on any prescribed home medications  - insulin coverage sliding scale   - FS AC & HS when eating, Q 6 hrs when NPO.  - Diet: Consistent Carbs when able to take PO  - HbA1c is 7.2.     Problem/Plan - :  ·  Problem: HLD (hyperlipidemia).   ·  Plan: Cont with Atorvastatin 80 mg as per neuro  - DASH diet when able to take PO  - Lipid panel @ goal    Problem/Plan - :  ·  Problem: Migraine.   ·  Plan: Hx/o Migraine - no prescribe medication as per patient  - Tylenol PRN for pain.     Problem/Plan - :  ·  Problem: Vitamin B 12 deficiency.   ·  Plan: Vitamin B 12 level 451, patient states she rarely take vitamins at home.     Problem/Plan - :  Problem: Preventive measure.  Plan: DVT Prophylaxis  JOANN stocking B/L.
51 y/o female (Turkish speaking, translation provided by daughter at bedside) with PMH of T2DM not on prescribe medications, HLD, B12 deficiency with neuropathy, migraines presented to ED with acute weakness, tingling of upper and lower extremities, right > left. Admitted for CVA management.    Problem/Plan - :  ·  Problem: Rt UE/LE numbness  ·  Plan: - Monitor on telemetry  - House Neurology consulted and recommended :  - Permissive HTN up to 220/120 mmHg for first 24 to 48 hours after the symptom onset followed by gradual   normotension.  - MRI head w/o contrast 8/15/21 --> nonacute  - Echo with bubble study  - Cont ASA 81 mg PO daily and Atorvastatin 80 mg PO QHS  - Seizure, fall and aspiration precautions  - Elevate head of the bed to 30 degree  - Neuro checks Q4H  - Speech and swallow  - PT/OT consult.   - MRI C-spine without gadolinium ordered    Problem/Plan - :  ·  Problem: Dysphagia.  ·  Plan: Speech eval noted  - Let's consult ENT for bedside laryngoscopy to eval for Candida     Problem/Plan - :  ·  Problem: DM (diabetes mellitus).   ·  Plan: - Patient not on any prescribed home medications  - insulin coverage sliding scale   - FS AC & HS when eating, Q 6 hrs when NPO.  - Diet: Consistent Carbs when able to take PO  - HbA1c is 7.2.     Problem/Plan - :  ·  Problem: HLD (hyperlipidemia).   ·  Plan: Cont with Atorvastatin 80 mg as per neuro  DASH diet when able to take PO  Check lipid profile in am.     Problem/Plan - :  ·  Problem: Migraine.   ·  Plan: Hx/o Migraine - no prescribe medication as per patient  Tylenol PRN for pain.     Problem/Plan - :  ·  Problem: Vitamin B 12 deficiency.   ·  Plan: Vitamin B 12 level 451, patient states she rarely take vitamins at home.     Problem/Plan - :  Problem: Preventive measure.  Plan: DVT Prophylaxis  JOANN stocking B/L.
49 y/o female (Romanian speaking, translation provided by daughter at bedside) with PMH of T2DM not on prescribe medications, HLD, B12 deficiency with neuropathy, migraines presented to ED with acute weakness, tingling of upper and lower extremities, right > left. Admitted for CVA management.    Problem/Plan - 1:  ·  Problem: Acute weakness.   ·  Plan: - Monitor on telemetry  - House Neurology consulted and recommended :  - Permissive HTN up to 220/120 mmHg for first 24 to 48 hours after the symptom onset followed by gradual   normotension.  - MRI head w/o contrast 8/15/21 -->   - Echo with bubble study  - RN dysphagia screen  - Cont ASA 81 mg PO daily and Atorvastatin 80 mg PO QHS  - Seizure, fall and aspiration precautions  - Elevate head of the bed to 30 degree  - Neuro checks Q4H  - Speech and swallow  - PT/OT consult.     Problem/Plan - 2:  ·  Problem: DM (diabetes mellitus).   ·  Plan: - Patient not on any prescribed home medications  - insulin coverage sliding scale   - FS AC & HS when eating, Q 6 hrs when NPO.  - Diet: Consistent Carbs when able to take PO  - HbA1c is 7.2.     Problem/Plan - 3:  ·  Problem: HLD (hyperlipidemia).   ·  Plan: Cont with Atorvastatin 80 mg as per neuro  DASH diet when able to take PO  Check lipid profile in am.     Problem/Plan - 4:  ·  Problem: Migraine.   ·  Plan: Hx/o Migraine - no prescribe medication as per patient  Tylenol PRN for pain.     Problem/Plan - 5:  ·  Problem: Vitamin B 12 deficiency.   ·  Plan: Vitamin B 12 level 451, patient states she rarely take vitamins at home.     Problem/Plan - 6:  Problem: Preventive measure.  Plan: DVT Prophylaxis  JOANN stocking B/L.
51 y/o female (Upper sorbian speaking, translation provided by daughter at bedside) with PMH of T2DM not on prescribe medications, HLD, B12 deficiency with neuropathy, migraines presented to ED with acute weakness, tingling of upper and lower extremities, right > left. Admitted for CVA management.    Problem/Plan - :  ·  Problem: Rt UE/LE numbness  ·  Plan: - Monitor on telemetry  - House Neurology consulted and recommended :  - Permissive HTN up to 220/120 mmHg for first 24 to 48 hours after the symptom onset followed by gradual   normotension.  - MRI head w/o contrast 8/15/21 --> nonacute  - Echo with bubble study 8/18/21 --> nl  - MR C-spine 8/17/21 showing degenerative changes without cord compromise  - Cont ASA 81 mg PO daily and atorvastatin 80 mg PO qhs  - Seizure, fall and aspiration precautions  - Elevate head of the bed to 30 degree  - Speech and swallow appreciated  - PMR consult appreciated      Problem/Plan - :  ·  Problem: Dysphagia.  ·  Plan: Speech eval noted  - Appreciate ENT evaluation    Problem/Plan - :  ·  Problem: DM (diabetes mellitus).   ·  Plan: - Patient not on any prescribed home medications  - insulin coverage sliding scale   - FS AC & HS when eating, Q 6 hrs when NPO.  - Diet: Consistent Carbs when able to take PO  - HbA1c is 7.2.     Problem/Plan - :  ·  Problem: HLD (hyperlipidemia).   ·  Plan: Cont with Atorvastatin 80 mg as per neuro  - DASH diet when able to take PO  - Lipid panel @ goal    Problem/Plan - :  ·  Problem: Migraine.   ·  Plan: Hx/o Migraine - no prescribe medication as per patient  - Tylenol PRN for pain.     Problem/Plan - :  ·  Problem: Vitamin B 12 deficiency.   ·  Plan: Vitamin B 12 level 451, patient states she rarely take vitamins at home.     Problem/Plan - :  ·  Problem: Preventive measure.  ·  Plan: DVT Prophylaxis  JOANN stocking B/L.    Problem/Plan - :  ·  Problem: Disposition  ·  Plan: Medically cleared for acute rehab

## 2021-08-18 NOTE — CHART NOTE - NSCHARTNOTEFT_GEN_A_CORE
< from: MR Cervical Spine No Cont (08.17.21 @ 21:09) >    EXAM:  MR SPINE CERVICAL        PROCEDURE DATE:  Aug 17 2021         INTERPRETATION:  Clinical indication: Right upper and lower extremity numbness    MRI of the cervical spine was performed using sagittal T1-T2 and STIR sequence. Axial T2 and gradient echo sequences were performed as well    Loss of the normal cervical lordosis is seen. This could be due to positioning or muscle spasm.    The vertebral body height alignment and marrow signal appear normal    Disc desiccation seen from C2 through C3 4 C4-5 C5-6 C6-7 levels these findings are secondary to chronic degenerative change    C2-3: Disc bulge is identified. Hypertrophic change is seen involving the left facet joint. Moderate to severe narrowing of the left neural foramen.    C3-4: Disc bulge and bilateral hypertrophic facet joint changes. Moderate narrowing of the right neural foramen and moderate to severe narrowing of the left neural foramen.    C4-5: Grade 1 anterolisthesis is seen. Hypertrophic changes seen involving left facet joint. Moderate narrowing of the left neural foramen    C5-6: Disc bulge is identified. No significant compromise of the spinal canal or either neural foramen.    C6-7: Disc bulge is seen. Central disc disc protrusion is seen. This finding does extend inferiorly behind the superior endplate of C7. No significant compromise of the spinal canal or either neural foramen.    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 The cyst on the left side measures approximately 6.3 mm and cyst on the right side measures approximately 5.2 mm.    T1-2: There is nerve root sleeve cyst seen on the left side that measures approximate 5.1 mm and on the right side measures approximately 3.9 mm.    The spinal cord demonstrates normal signal and caliber.    Evaluation of the paraspinal soft tissues appear normal.    < end of copied text >  < from: MR Head No Cont (08.15.21 @ 11:51) >      IMPRESSION: No acute hemorrhage mass or mass effect.    MR Head and Cspine reviewed, MR head is negative. MR Cspine with degenerative changes, more prominently on the L > R.  No structural etiology ascertained for R hemiparesis.     Recommend:   - no further inpatient neurologic workup at this time   - may follow up outpatient with Neurology at [phone] for further evaluation   - PT/OT as needed     Please call with questions, thank you     < end of copied text >

## 2021-08-19 ENCOUNTER — TRANSCRIPTION ENCOUNTER (OUTPATIENT)
Age: 51
End: 2021-08-19

## 2021-08-19 ENCOUNTER — INPATIENT (INPATIENT)
Facility: HOSPITAL | Age: 51
LOS: 6 days | Discharge: HOME | DRG: 949 | End: 2021-08-26
Attending: PHYSICAL MEDICINE & REHABILITATION | Admitting: PHYSICAL MEDICINE & REHABILITATION
Payer: COMMERCIAL

## 2021-08-19 VITALS
HEIGHT: 64 IN | WEIGHT: 179.68 LBS | DIASTOLIC BLOOD PRESSURE: 76 MMHG | OXYGEN SATURATION: 96 % | RESPIRATION RATE: 16 BRPM | HEART RATE: 76 BPM | SYSTOLIC BLOOD PRESSURE: 142 MMHG | TEMPERATURE: 98 F

## 2021-08-19 VITALS
SYSTOLIC BLOOD PRESSURE: 122 MMHG | OXYGEN SATURATION: 100 % | TEMPERATURE: 98 F | DIASTOLIC BLOOD PRESSURE: 80 MMHG | RESPIRATION RATE: 17 BRPM | HEART RATE: 68 BPM

## 2021-08-19 DIAGNOSIS — F44.4 CONVERSION DISORDER WITH MOTOR SYMPTOM OR DEFICIT: ICD-10-CM

## 2021-08-19 PROBLEM — G43.909 MIGRAINE, UNSPECIFIED, NOT INTRACTABLE, WITHOUT STATUS MIGRAINOSUS: Chronic | Status: ACTIVE | Noted: 2021-08-14

## 2021-08-19 PROBLEM — E78.5 HYPERLIPIDEMIA, UNSPECIFIED: Chronic | Status: ACTIVE | Noted: 2021-08-14

## 2021-08-19 LAB
GLUCOSE BLDC GLUCOMTR-MCNC: 104 MG/DL — HIGH (ref 70–99)
GLUCOSE BLDC GLUCOMTR-MCNC: 106 MG/DL — HIGH (ref 70–99)
GLUCOSE BLDC GLUCOMTR-MCNC: 117 MG/DL — HIGH (ref 70–99)
GLUCOSE BLDC GLUCOMTR-MCNC: 143 MG/DL — HIGH (ref 70–99)
GLUCOSE BLDC GLUCOMTR-MCNC: 165 MG/DL — HIGH (ref 70–99)

## 2021-08-19 PROCEDURE — 99232 SBSQ HOSP IP/OBS MODERATE 35: CPT

## 2021-08-19 RX ORDER — PANTOPRAZOLE SODIUM 20 MG/1
40 TABLET, DELAYED RELEASE ORAL
Refills: 0 | Status: DISCONTINUED | OUTPATIENT
Start: 2021-08-19 | End: 2021-08-26

## 2021-08-19 RX ORDER — ATORVASTATIN CALCIUM 80 MG/1
80 TABLET, FILM COATED ORAL AT BEDTIME
Refills: 0 | Status: DISCONTINUED | OUTPATIENT
Start: 2021-08-19 | End: 2021-08-26

## 2021-08-19 RX ORDER — POLYETHYLENE GLYCOL 3350 17 G/17G
17 POWDER, FOR SOLUTION ORAL DAILY
Refills: 0 | Status: DISCONTINUED | OUTPATIENT
Start: 2021-08-19 | End: 2021-08-20

## 2021-08-19 RX ORDER — ENOXAPARIN SODIUM 100 MG/ML
40 INJECTION SUBCUTANEOUS DAILY
Refills: 0 | Status: DISCONTINUED | OUTPATIENT
Start: 2021-08-19 | End: 2021-08-26

## 2021-08-19 RX ORDER — ACETAMINOPHEN 500 MG
650 TABLET ORAL EVERY 6 HOURS
Refills: 0 | Status: DISCONTINUED | OUTPATIENT
Start: 2021-08-19 | End: 2021-08-25

## 2021-08-19 RX ORDER — PREGABALIN 225 MG/1
1 CAPSULE ORAL
Qty: 0 | Refills: 0 | DISCHARGE
Start: 2021-08-19

## 2021-08-19 RX ORDER — ASPIRIN/CALCIUM CARB/MAGNESIUM 324 MG
81 TABLET ORAL DAILY
Refills: 0 | Status: DISCONTINUED | OUTPATIENT
Start: 2021-08-19 | End: 2021-08-26

## 2021-08-19 RX ORDER — INSULIN LISPRO 100/ML
VIAL (ML) SUBCUTANEOUS AT BEDTIME
Refills: 0 | Status: DISCONTINUED | OUTPATIENT
Start: 2021-08-19 | End: 2021-08-26

## 2021-08-19 RX ORDER — DEXTROSE 50 % IN WATER 50 %
15 SYRINGE (ML) INTRAVENOUS ONCE
Refills: 0 | Status: DISCONTINUED | OUTPATIENT
Start: 2021-08-19 | End: 2021-08-26

## 2021-08-19 RX ORDER — DEXTROSE 50 % IN WATER 50 %
25 SYRINGE (ML) INTRAVENOUS ONCE
Refills: 0 | Status: DISCONTINUED | OUTPATIENT
Start: 2021-08-19 | End: 2021-08-26

## 2021-08-19 RX ORDER — SENNA PLUS 8.6 MG/1
2 TABLET ORAL AT BEDTIME
Refills: 0 | Status: DISCONTINUED | OUTPATIENT
Start: 2021-08-19 | End: 2021-08-26

## 2021-08-19 RX ORDER — INSULIN LISPRO 100/ML
VIAL (ML) SUBCUTANEOUS
Refills: 0 | Status: DISCONTINUED | OUTPATIENT
Start: 2021-08-19 | End: 2021-08-26

## 2021-08-19 RX ORDER — PREGABALIN 225 MG/1
1000 CAPSULE ORAL DAILY
Refills: 0 | Status: DISCONTINUED | OUTPATIENT
Start: 2021-08-19 | End: 2021-08-19

## 2021-08-19 RX ORDER — DEXTROSE 50 % IN WATER 50 %
12.5 SYRINGE (ML) INTRAVENOUS ONCE
Refills: 0 | Status: DISCONTINUED | OUTPATIENT
Start: 2021-08-19 | End: 2021-08-26

## 2021-08-19 RX ORDER — GLUCAGON INJECTION, SOLUTION 0.5 MG/.1ML
1 INJECTION, SOLUTION SUBCUTANEOUS ONCE
Refills: 0 | Status: DISCONTINUED | OUTPATIENT
Start: 2021-08-19 | End: 2021-08-26

## 2021-08-19 RX ORDER — SODIUM CHLORIDE 9 MG/ML
1000 INJECTION, SOLUTION INTRAVENOUS
Refills: 0 | Status: DISCONTINUED | OUTPATIENT
Start: 2021-08-19 | End: 2021-08-26

## 2021-08-19 RX ORDER — BENZOCAINE AND MENTHOL 5; 1 G/100ML; G/100ML
1 LIQUID ORAL EVERY 6 HOURS
Refills: 0 | Status: DISCONTINUED | OUTPATIENT
Start: 2021-08-19 | End: 2021-08-26

## 2021-08-19 RX ADMIN — ATORVASTATIN CALCIUM 80 MILLIGRAM(S): 80 TABLET, FILM COATED ORAL at 22:16

## 2021-08-19 RX ADMIN — Medication 650 MILLIGRAM(S): at 23:01

## 2021-08-19 RX ADMIN — Medication 1 DROP(S): at 06:47

## 2021-08-19 RX ADMIN — Medication 400 MILLIGRAM(S): at 09:56

## 2021-08-19 RX ADMIN — SENNA PLUS 2 TABLET(S): 8.6 TABLET ORAL at 22:16

## 2021-08-19 RX ADMIN — Medication 1: at 11:59

## 2021-08-19 RX ADMIN — Medication 1000 MILLIGRAM(S): at 10:06

## 2021-08-19 RX ADMIN — SODIUM CHLORIDE 60 MILLILITER(S): 9 INJECTION, SOLUTION INTRAVENOUS at 09:56

## 2021-08-19 NOTE — DISCHARGE NOTE NURSING/CASE MANAGEMENT/SOCIAL WORK - PATIENT PORTAL LINK FT
You can access the FollowMyHealth Patient Portal offered by [facility] by registering at the following website: [email or web address]. By joining [facility] [employer]’s FollowMyHealth portal, you will also be able to view your health information using other applications (apps) compatible with our system.

## 2021-08-19 NOTE — H&P ADULT - NSHPREVIEWOFSYSTEMS_GEN_ALL_CORE
REVIEW OF SYSTEMS  Constitutional: No fever, No Chills, No fatigue  HEENT: No eye pain, No visual disturbances, No difficulty hearing, + right sided headache, watery eyes  Pulm: No cough,  No shortness of breath  Cardio: No chest pain, No palpitations  GI:  No abdominal pain, No nausea, No vomiting, No diarrhea, No constipation, + incontinence  : No dysuria, No frequency, No hematuria, + incontinence  Neuro: + headache, No memory loss, + loss of strength, +numbness, No tremors  Skin: No itching, No rashes, No lesions   Endo: No temperature intolerance  MSK: No joint pain, No joint swelling, No muscle pain, No Neck or back pain  Psych:  No depression, No anxiety

## 2021-08-19 NOTE — CHART NOTE - NSCHARTNOTEFT_GEN_A_CORE
Patient with degenerative cervical and thoracic spine disease, including disc bulges and Moderate to severe narrowing of the left neural foramen and central disc protrusion througout spine.

## 2021-08-19 NOTE — H&P ADULT - NSHPLABSRESULTS_GEN_ALL_CORE
08-18    143  |  111<H>  |  11  ----------------------------<  150<H>  3.7   |  20<L>  |  0.59    Ca    9.1      18 Aug 2021 08:05  Phos  3.5     08-18  Mg     2.00     08-18                              13.0   5.43  )-----------( 301      ( 18 Aug 2021 08:05 )             40.1        < from: MR Cervical Spine No Cont (08.17.21 @ 21:09) >      EXAM:  MR SPINE CERVICAL        PROCEDURE DATE:  Aug 17 2021         INTERPRETATION:  Clinical indication: Right upper and lower extremity numbness    MRI of the cervical spine was performed using sagittal T1-T2 and STIR sequence. Axial T2 and gradient echo sequences were performed as well    Loss of the normal cervical lordosis is seen. This could be due to positioning or muscle spasm.    The vertebral body height alignment and marrow signal appear normal    Disc desiccation seen from C2 through C3 4 C4-5 C5-6 C6-7 levels these findings are secondary to chronic degenerative change    C2-3: Disc bulge is identified. Hypertrophic change is seen involving the left facet joint. Moderate to severe narrowing of the left neural foramen.    C3-4: Disc bulge and bilateral hypertrophic facet joint changes. Moderate narrowing of the right neural foramen and moderate to severe narrowing of the left neural foramen.    C4-5: Grade 1 anterolisthesis is seen. Hypertrophic changes seen involving left facet joint. Moderate narrowing of the left neural foramen    C5-6: Disc bulge is identified. No significant compromise of the spinal canal or either neural foramen.    C6-7: Disc bulge is seen. Central disc disc protrusion is seen. This finding does extend inferiorly behind the superior endplate of C7. No significant compromise of the spinal canal or either neural foramen.    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 The cyst on the left side measures approximately 6.3 mm and cyst on the right side measures approximately 5.2 mm.    T1-2: There is nerve root sleeve cyst seen on the left side that measures approximate 5.1 mm and on the right side measures approximately 3.9 mm.    The spinal cord demonstrates normal signal and caliber.    Evaluation of the paraspinal soft tissues appear normal.    IMPRESSION: Degenerative changes as described above.                --- End of Report ---              NORBERTO VILLAVICENCIO MD; Attending Radiologist  This document has been electronically signed. Aug 18 2021  9:02AM

## 2021-08-19 NOTE — DISCHARGE NOTE PROVIDER - HOSPITAL COURSE
49 y/o femalwith PMH of T2DM not on prescribe medications, HLD, B12 deficiency with neuropathy, migraines presented to ED with acute weakness, tingling of upper and lower extremities, right > left.nt. In ED, BP was 160-118 and CT brain was negative for acute pathology.. patient stable for discharge to acute rehab as per Dr Jimenez on 8/19/2021. May follow up outpatient with Neurology at [phone] for further evaluation      Acute weakness.  Plan: - Monitor on telemetry  - House Neurology consulted and recommended :  - MRI head w/o contrast: no acute findings  - TTE results: normal   - Start ASA 81 mg PO daily and Atorvastatin 80 mg PO QHS  - Seizure, fall and aspiration precautions  - Elevate head of the bed to 30 degree  - Neuro checks Q4H  - 8/16: s/P Cine  Speech and swallow  - 8/16: ENT consulted: Burning and tightness while eating  PT/OT consult.       DM (diabetes mellitus).  Plan: - Patient not on any prescribed home medications  - insulin coverage sliding scale   - FS AC & HS when eating, Q 6 hrs when NPO.  - Diet: Consistent Carbs when able to take PO  - HbA1c is 7.2.     HLD (hyperlipidemia).  Plan: Start with Atorvastatin 80 mg as per neuro  DASH diet when able to take PO  Check lipid profile in am.     Migraine.  Plan: Hx/o Migraine - no prescribe medication as per patient  Tylenol PRN for pain.     Vitamin B 12 deficiency.  Plan: Vitamin B 12 level 451, patient states she rarely take vitamins at home.     Preventive measure. Plan: DVT Prophylaxis  JOANN stocking B/L.   51 y/o femalwith PMH of T2DM not on prescribe medications, HLD, B12 deficiency with neuropathy, migraines presented to ED with acute weakness, tingling of upper and lower extremities, right > left.nt. In ED, BP was 160-118 and CT brain was negative for acute pathology.. patient stable for discharge to acute rehab as per Dr Jimenez on 8/19/2021. May follow up outpatient with Neurology at [phone] for further evaluation      Acute weakness.  Plan: - Monitor on telemetry  - House Neurology consulted and recommended :  - MRI head w/o contrast: no acute findings  - TTE results: normal   - Start ASA 81 mg PO daily and Atorvastatin 80 mg PO QHS  - Seizure, fall and aspiration precautions  - Elevate head of the bed to 30 degree  - Neuro checks Q4H  - 8/16: s/P Cine  Speech and swallow  - 8/16: ENT consulted: Burning and tightness while eating  PT/OT consult.       DM (diabetes mellitus).  Plan: - Patient not on any prescribed home medications  - insulin coverage sliding scale   - FS AC & HS when eating, Q 6 hrs when NPO.  - Diet: Consistent Carbs when able to take PO  - HbA1c is 7.2.     HLD (hyperlipidemia).  Plan: Start with Atorvastatin 80 mg as per neuro  DASH diet when able to take PO  Check lipid profile in am.     Migraine.  Plan: Hx/o Migraine - no prescribe medication as per patient  Tylenol PRN for pain.     Vitamin B 12 deficiency.  Plan: Vitamin B 12 level 451, patient states she rarely take vitamins at home.     Preventive measure. Plan: DVT Prophylaxis  JOANN stocking B/L.      Attending Addendum:  Patient seen and examined by me on the discharge day. Met with the daughter and two sons at bedside. All questions answered. Plan is for rehab. Medications reviewed.  D/w neurology house. No clear etiology of weakness. vitb12 is 450, not significant enough for this symptoms. supplement started. No structural abnormalities on MRI. Neuro suggest outpt follow up after rehab.  All questions answered in details. Follow up plan explained.  More than 30 mins were spent evaluating patient and coordinating care for discharge.  Discharge summary sent to pt's primary care physician at [facility].

## 2021-08-19 NOTE — DISCHARGE NOTE PROVIDER - NSDCCPCAREPLAN_GEN_ALL_CORE_FT
PRINCIPAL DISCHARGE DIAGNOSIS  Diagnosis: Acute weakness  Assessment and Plan of Treatment: Your MRI did not show that you had a stroke.   May follow up outpatient with Neurology at [phone] for further evaluation as an outpatient. Your MRI Cervical spine did show multiple areas of degernerative changes with herniated discs or formaen narrowing. Please follow up with your neurologist for possible referral to a neurosurgeon.      SECONDARY DISCHARGE DIAGNOSES  Diagnosis: Migraine  Assessment and Plan of Treatment:     Diagnosis: DM (diabetes mellitus)  Assessment and Plan of Treatment: 1800 calorie diabetic diet/ low salt, low fat , low cholesterol   Follow up with your primary care physician at [facility]- you may need a referral to an endocrinologist

## 2021-08-19 NOTE — CHART NOTE - NSCHARTNOTEFT_GEN_A_CORE
Notified by RN that patient complains of HA and daughter would like to go over MRI C spine results. Patient seen at bedside by provider. Patient complains of right sided HA that is similar to previous rating 7/10. Patient also endorses a brief moment of chest pain while adjusting in bed and pointed to lower left breast, described as muscle spasm. Patient also endorses nausea after taking pm meds with yogurt. Patient denies dizziness, hearing or visual changes, current CP, SOB, cough, palpitations.    ICU Vital Signs Last 24 Hrs  T(C): 36.3 (18 Aug 2021 22:49), Max: 36.9 (18 Aug 2021 11:14)  T(F): 97.4 (18 Aug 2021 22:49), Max: 98.5 (18 Aug 2021 11:14)  HR: 75 (18 Aug 2021 22:49) (64 - 76)  BP: 110/65 (18 Aug 2021 22:49) (100/69 - 125/76)  BP(mean): --  ABP: --  ABP(mean): --  RR: 18 (18 Aug 2021 22:49) (14 - 18)  SpO2: 98% (18 Aug 2021 22:49) (98% - 100%)    General: NAD   Cards: S1/S2, no murmurs, +tender to palpation of left pec  Pulm: CTA bilaterally. No wheezes.   Abdomen: Soft, NTND. BS (+)   Extremities: No pedal edema. no redness, swelling, warmth   Neurology: AOx3, CN intact, RLE w/ weakness unchanged from admission, RUE strength improved     < from: MR Cervical Spine No Cont (08.17.21 @ 21:09) >    IMPRESSION: Degenerative changes as described above.    < end of copied text >      Plan:  -CP likely [facility] -Tylenol 1G IV x1, EKG PRN if repeats   -HA- Tylenol 1g IV x1  -Nausea- Reglan 5mg IV x1   -Discussed MRI C Spine results with patient and daughter regarding degenerative changes, all questions answered   -Will continue to monitor Notified by RN that patient complains of HA and daughter would like to go over MRI C spine results. Patient seen at bedside by provider. Patient complains of right sided HA that is similar to previous rating 7/10. Patient also endorses a brief moment of chest pain while adjusting in bed and pointed to lower left breast, described as muscle spasm. Patient also endorses nausea after taking pm meds with yogurt. Patient denies dizziness, hearing or visual changes, current CP, SOB, cough, palpitations.    ICU Vital Signs Last 24 Hrs  T(C): 36.3 (18 Aug 2021 22:49), Max: 36.9 (18 Aug 2021 11:14)  T(F): 97.4 (18 Aug 2021 22:49), Max: 98.5 (18 Aug 2021 11:14)  HR: 75 (18 Aug 2021 22:49) (64 - 76)  BP: 110/65 (18 Aug 2021 22:49) (100/69 - 125/76)  BP(mean): --  ABP: --  ABP(mean): --  RR: 18 (18 Aug 2021 22:49) (14 - 18)  SpO2: 98% (18 Aug 2021 22:49) (98% - 100%)    General: NAD, lying in bed, speaking clear full sentences   Cards: S1/S2, no murmurs, +tender to palpation of left pec  Pulm: CTA bilaterally. No wheezes.   Abdomen: Soft, NTND. BS (+)   Extremities: No pedal edema. no redness, swelling, warmth   Neurology: AOx3, CN intact, RLE w/ weakness unchanged from admission, RUE strength improved     < from: MR Cervical Spine No Cont (08.17.21 @ 21:09) >    IMPRESSION: Degenerative changes as described above.    < end of copied text >      Plan:  -CP likely MSK -Tylenol 1G IV x1, EKG PRN if repeats   -HA- Tylenol 1g IV x1  -Nausea- Reglan 5mg IV x1   -Discussed MRI C Spine results with patient and daughter regarding degenerative changes, all questions answered   -Will continue to monitor

## 2021-08-19 NOTE — PROGRESS NOTE ADULT - SUBJECTIVE AND OBJECTIVE BOX
MRI C-spine showing only degenerative changes with normal signal/caliber of the spinal cord      Vital Signs Last 24 Hrs  T(C): 36.5 (18 Aug 2021 17:38), Max: 36.9 (18 Aug 2021 11:14)  T(F): 97.7 (18 Aug 2021 17:38), Max: 98.5 (18 Aug 2021 11:14)  HR: 68 (18 Aug 2021 17:38) (64 - 76)  BP: 125/76 (18 Aug 2021 17:38) (100/69 - 125/76)  BP(mean): --  RR: 14 (18 Aug 2021 17:38) (14 - 18)  SpO2: 100% (18 Aug 2021 17:38) (99% - 100%)    I&O's Summary    21 @ 07:01  -  21 @ 07:00  --------------------------------------------------------  IN: 0 mL / OUT: 1200 mL / NET: -1200 mL          GENERAL: NAD  HEENT: EOMI, NCAT  NECK: Supple, No JVD  CHEST/LUNG: Clear to auscultation bilaterally; Nl work of breathing  HEART: Regular rate and rhythm; Nl S1/S2; No murmurs, rubs, or gallops  ABDOMEN: Soft, Nontender, Nondistended; Bowel sounds present; No hepatosplenomegaly  EXTREMITIES:  2+ Peripheral Pulses, No clubbing, cyanosis, or edema  SKIN: No rashes or lesions; Normal turgor, texture  NEURO: (+)paresthesias rt > lt UE + LE; 0-2/5 strength rt side; 5/5 on the left; (-) Babinski's; speech/comprehension/CNs are intact; no hyperflexia  PSYCH: Flat affect; no agitation or delirium    LABS:                        13.0   5.43  )-----------( 301      ( 18 Aug 2021 08:05 )             40.1     08-18    143  |  111<H>  |  11  ----------------------------<  150<H>  3.7   |  20<L>  |  0.59    Ca    9.1      18 Aug 2021 08:05  Phos  3.5     -  Mg     2.00     -        CAPILLARY BLOOD GLUCOSE      POCT Blood Glucose.: 104 mg/dL (18 Aug 2021 17:30)  POCT Blood Glucose.: 176 mg/dL (18 Aug 2021 11:36)  POCT Blood Glucose.: 143 mg/dL (18 Aug 2021 07:21)  POCT Blood Glucose.: 111 mg/dL (17 Aug 2021 21:30)        Urinalysis Basic - ( 16 Aug 2021 18:26 )    Color: Yellow / Appearance: Clear / S.030 / pH: x  Gluc: x / Ketone: Small  / Bili: Negative / Urobili: <2 mg/dL   Blood: x / Protein: Trace / Nitrite: Negative   Leuk Esterase: Small / RBC: 3 /HPF / WBC 8 /HPF   Sq Epi: x / Non Sq Epi: 3 /HPF / Bacteria: Moderate        RADIOLOGY & ADDITIONAL TESTS:    Imaging Personally Reviewed:  [x] YES  [ ] NO    Will obtain old records:  [ ] YES  [x] NO          
Patient is a 50y old  Female who presents with a chief complaint of Weakness of extremities (14 Aug 2021 15:40)      SUBJECTIVE / OVERNIGHT EVENTS:    Daughter was @ bedside to help with translation  The rt-predominant tingling/numbness persists from upper chest wall down to her feet      Vital Signs Last 24 Hrs  T(C): 36.8 (15 Aug 2021 09:45), Max: 37 (14 Aug 2021 15:00)  T(F): 98.2 (15 Aug 2021 09:45), Max: 98.6 (14 Aug 2021 15:00)  HR: 62 (15 Aug 2021 09:45) (62 - 86)  BP: 130/80 (15 Aug 2021 09:45) (123/88 - 154/90)  BP(mean): --  RR: 17 (15 Aug 2021 09:45) (15 - 18)  SpO2: 100% (15 Aug 2021 09:45) (99% - 100%)  I&O's Summary      GENERAL: NAD, well-developed  HEAD:  Atraumatic, Normocephalic  EYES: EOMI, PERRLA, conjunctiva and sclera clear  NECK: Supple, No JVD, No LAD, No carotid bruits, No thyromegaly  CHEST/LUNG: Clear to auscultation bilaterally; Nl work of breathing  HEART: Regular rate and rhythm; Nl S1/S2; No murmurs, rubs, or gallops  ABDOMEN: Soft, Nontender, Nondistended; Bowel sounds present; No hepatosplenomegaly  EXTREMITIES:  2+ Peripheral Pulses, No clubbing, cyanosis, or edema  SKIN: No rashes or lesions; Normal turgor, texture  NEURO: (+)paresthesias rt > lt UE + LE; 0-2/5 strength rt side; 5/5 on the left; (-) Babinski's; speech/comprehension/CNs are intact; no hyperflexia  PSYCH: Nl affect; no agitation or delirium; no suicidal or homicidal ideation    LABS:                        14.3   6.51  )-----------( 308      ( 15 Aug 2021 07:21 )             44.2     08-15    141  |  104  |  14  ----------------------------<  144<H>  3.6   |  22  |  0.85    Ca    9.9      15 Aug 2021 06:50  Phos  5.0     08-15  Mg     2.30     08-15    TPro  7.2  /  Alb  4.7  /  TBili  0.7  /  DBili  x   /  AST  20  /  ALT  22  /  AlkPhos  92  08-15    PT/INR - ( 14 Aug 2021 11:32 )   PT: 11.9 sec;   INR: 1.04 ratio         PTT - ( 14 Aug 2021 11:32 )  PTT:35.2 sec  CAPILLARY BLOOD GLUCOSE  163 (14 Aug 2021 11:40)      POCT Blood Glucose.: 142 mg/dL (15 Aug 2021 07:37)  POCT Blood Glucose.: 115 mg/dL (15 Aug 2021 01:31)  POCT Blood Glucose.: 104 mg/dL (14 Aug 2021 21:50)  POCT Blood Glucose.: 105 mg/dL (14 Aug 2021 16:58)    CARDIAC MARKERS ( 14 Aug 2021 23:15 )  x     / x     / 99 U/L / x     / <1.0 ng/mL  CARDIAC MARKERS ( 14 Aug 2021 11:32 )  x     / x     / 144 U/L / x     / <1.0 ng/mL          RADIOLOGY & ADDITIONAL TESTS:    Imaging Personally Reviewed:  [x] YES  [ ] NO    Will obtain old records:  [ ] YES  [x] NO      MEDICATIONS  (STANDING):  aspirin  chewable 81 milliGRAM(s) Oral daily  atorvastatin 80 milliGRAM(s) Oral at bedtime  dextrose 40% Gel 15 Gram(s) Oral once  dextrose 5%. 1000 milliLiter(s) (50 mL/Hr) IV Continuous <Continuous>  dextrose 5%. 1000 milliLiter(s) (100 mL/Hr) IV Continuous <Continuous>  dextrose 50% Injectable 25 Gram(s) IV Push once  dextrose 50% Injectable 12.5 Gram(s) IV Push once  dextrose 50% Injectable 25 Gram(s) IV Push once  glucagon  Injectable 1 milliGRAM(s) IntraMuscular once  insulin lispro (ADMELOG) corrective regimen sliding scale   SubCutaneous three times a day before meals  insulin lispro (ADMELOG) corrective regimen sliding scale   SubCutaneous at bedtime  pantoprazole  Injectable 40 milliGRAM(s) IV Push daily    MEDICATIONS  (PRN):  acetaminophen   Tablet .. 650 milliGRAM(s) Oral every 6 hours PRN Temp greater or equal to 38C (100.4F), Mild Pain (1 - 3), Moderate Pain (4 - 6)      Care Discussed with Consultants/Other Providers [x] YES  [ ] NO    HEALTH ISSUES - PROBLEM Dx:  Acute weakness    DM (diabetes mellitus)    HLD (hyperlipidemia)    Vitamin B 12 deficiency    Preventive measure    Migraine        
Patient is a 50y old  Female who presents with a chief complaint of Weakness of extremities (19 Aug 2021 12:54)      HPI:  51 y/o female (Mohawk speaking, translation provided by daughter at bedside) with PMH of T2DM not on prescribe medications, HLD, B12 deficiency with neuropathy, migraines presented to ED with acute weakness, tingling of upper and lower extremities, right > left. Patient states she was in her usual state of health when she developed a headache this morning and she started feeling the weakness to the point she had difficulty walking. She had tingling sensation throughout all 4 extremities for the past 2 days prior to today.     In ED, BP was 160-118 and CT brain was negative for acute pathology.       (14 Aug 2021 15:40)    family at bedside, patient reports she is looking forward to rehab. Worked with Ralf yesterday from PT.  Tolerating therapy.  Reports headache today, just took medication.    REVIEW OF SYSTEMS  Constitutional - No fever, No weight loss, No fatigue  HEENT - No eye pain, No visual disturbances, No difficulty hearing, No tinnitus, No vertigo, No neck pain  Respiratory - No cough, No wheezing, No shortness of breath  Cardiovascular - No chest pain, No palpitations  Gastrointestinal - No abdominal pain, No nausea, No vomiting, No diarrhea, No constipation  Genitourinary - No dysuria, No frequency, No hematuria, No incontinence  Neurological - No headaches, No memory loss, + loss of strength, No numbness, No tremors  Skin - No itching, No rashes, No lesions   Endocrine - No temperature intolerance  Musculoskeletal - +  pain  Psychiatric - No depression, No anxiety    PAST MEDICAL & SURGICAL HISTORY  DM (diabetes mellitus)    HLD (hyperlipidemia)    Migraine    No significant past surgical history         CURRENT FUNCTIONAL STATUS   8/19  patient received in semi supine in no distress. patient with improving functional mobility as noted by transferring supine > sit and sit < > stand with contact guard for stability. patient ambulated 15ft with minimum assistance x1. distance limited due to patient reporting "pain" but did not quantify or locate area of pain. patient stood at sink in room without physical assistance and used bilateral UE to wash face and arms without notable functional impairments. patient left in chair in room.         RECENT LABS/IMAGING  CBC Full  -  ( 18 Aug 2021 08:05 )  WBC Count : 5.43 K/uL  RBC Count : 4.58 M/uL  Hemoglobin : 13.0 g/dL  Hematocrit : 40.1 %  Platelet Count - Automated : 301 K/uL  Mean Cell Volume : 87.6 fL  Mean Cell Hemoglobin : 28.4 pg  Mean Cell Hemoglobin Concentration : 32.4 gm/dL  Auto Neutrophil # : x  Auto Lymphocyte # : x  Auto Monocyte # : x  Auto Eosinophil # : x  Auto Basophil # : x  Auto Neutrophil % : x  Auto Lymphocyte % : x  Auto Monocyte % : x  Auto Eosinophil % : x  Auto Basophil % : x    08-18    143  |  111<H>  |  11  ----------------------------<  150<H>  3.7   |  20<L>  |  0.59    Ca    9.1      18 Aug 2021 08:05  Phos  3.5     08-18  Mg     2.00     08-18          VITALS  T(C): 36.5 (08-19-21 @ 12:39), Max: 36.7 (08-19-21 @ 01:00)  HR: 78 (08-19-21 @ 12:39) (62 - 78)  BP: 138/88 (08-19-21 @ 12:39) (102/66 - 138/88)  RR: 17 (08-19-21 @ 12:39) (14 - 18)  SpO2: 100% (08-19-21 @ 12:39) (98% - 100%)  Wt(kg): --    ALLERGIES  penicillin (Unknown)      MEDICATIONS   artificial tears (preservative free) Ophthalmic Solution 1 Drop(s) Both EYES two times a day  aspirin  chewable 81 milliGRAM(s) Oral daily  atorvastatin 80 milliGRAM(s) Oral at bedtime  benzocaine 15 mG/menthol 3.6 mG (Sugar-Free) Lozenge 1 Lozenge Oral every 6 hours PRN  cyanocobalamin 1000 MICROGram(s) Oral daily  dextrose 40% Gel 15 Gram(s) Oral once  dextrose 5% + sodium chloride 0.9%. 1000 milliLiter(s) IV Continuous <Continuous>  dextrose 5%. 1000 milliLiter(s) IV Continuous <Continuous>  dextrose 5%. 1000 milliLiter(s) IV Continuous <Continuous>  dextrose 50% Injectable 25 Gram(s) IV Push once  dextrose 50% Injectable 12.5 Gram(s) IV Push once  dextrose 50% Injectable 25 Gram(s) IV Push once  glucagon  Injectable 1 milliGRAM(s) IntraMuscular once  insulin lispro (ADMELOG) corrective regimen sliding scale   SubCutaneous three times a day before meals  insulin lispro (ADMELOG) corrective regimen sliding scale   SubCutaneous at bedtime  metoclopramide Injectable 5 milliGRAM(s) IV Push once  pantoprazole  Injectable 40 milliGRAM(s) IV Push daily      ----------------------------------------------------------------------------------------   PHYSICAL EXAM  Constitutional - NAD, Comfortable  HEENT - NCAT, EOMI  Chest - no respiratory distress  Cardiovascular - RRR, S1S2   Abdomen -  Soft, NTND  Extremities - No C/C/E, No calf tenderness   Neurologic Exam -                    Cognitive - Awake, Alert, AAO to self, place, date, year, situation     Communication - Fluent, No dysarthria     Cranial Nerves -trace R facial weakness     Motor -                      LEFT    UE - ShAB 5/5, EF 5/5, EE 5/5, WE 5/5,  5/5                    RIGHT UE - ShAB 3/5, EF 3/5, EE 3/5, WE 3/5,  2/5                      LEFT    LE - HF 5/5, KE 5/5, DF 5/5, PF 5/5                    RIGHT LE - HF 1+/5, KE 0/5, DF 0/5, PF 0/5        Sensory - impaired to LT on the right     Reflexes - DTR Intact at patella     OculoVestibular - No saccades, No nystagmus, VOR         Balance - WNL Static  Psychiatric - Mood stable, Affect WNL  ----------------------------------------------------------------------------------------  ASSESSMENT/PLAN 49 yo F p/w R sided weakness and sensory changes, MRI brain negative.  consider other diagnosis including functional weakness  SLP- soft solids and thin liquids  continue bedside PT and OT for ambulation, bed mobility, transfers, adls  Pain -consider tylenol prn  Rehab -  patient with R sided weakness and sensory changes, requires assistance for mobility, previously independent    recommended for acute inpatient rehab, patient can tolerate 3 hrs/day of therapy with medical supervision.               
ENT found no masses or lesions visualized in post cricoid space or pyriform sinuses bilaterally  Vocal cords were mobile b/l    Vital Signs Last 24 Hrs  T(C): 36.7 (17 Aug 2021 05:45), Max: 37 (16 Aug 2021 17:45)  T(F): 98 (17 Aug 2021 05:45), Max: 98.6 (16 Aug 2021 17:45)  HR: 80 (17 Aug 2021 05:45) (65 - 80)  BP: 122/74 (17 Aug 2021 05:45) (108/70 - 129/74)  BP(mean): --  RR: 17 (17 Aug 2021 05:45) (17 - 18)  SpO2: 100% (17 Aug 2021 05:45) (97% - 100%)    I&O's Summary      GENERAL: NAD, well-developed  HEENT: EOMI, NCAT  NECK: Supple, No JVD  CHEST/LUNG: Clear to auscultation bilaterally; Nl work of breathing  HEART: Regular rate and rhythm; Nl S1/S2; No murmurs, rubs, or gallops  ABDOMEN: Soft, Nontender, Nondistended; Bowel sounds present; No hepatosplenomegaly  EXTREMITIES:  2+ Peripheral Pulses, No clubbing, cyanosis, or edema  SKIN: No rashes or lesions; Normal turgor, texture  NEURO: (+)paresthesias rt > lt UE + LE; 0-2/5 strength rt side; 5/5 on the left; (-) Babinski's; speech/comprehension/CNs are intact; no hyperflexia  PSYCH: Nl affect; no agitation or delirium; no suicidal or homicidal ideation    LABS:                        13.5   5.71  )-----------( 309      ( 17 Aug 2021 08:54 )             41.2     08-17    142  |  108<H>  |  15  ----------------------------<  146<H>  3.7   |  23  |  0.67    Ca    9.5      17 Aug 2021 08:54  Phos  3.4     08-17  Mg     2.10     08-17        CAPILLARY BLOOD GLUCOSE      POCT Blood Glucose.: 151 mg/dL (17 Aug 2021 08:03)  POCT Blood Glucose.: 114 mg/dL (16 Aug 2021 21:13)  POCT Blood Glucose.: 119 mg/dL (16 Aug 2021 17:30)        Urinalysis Basic - ( 16 Aug 2021 18:26 )    Color: Yellow / Appearance: Clear / S.030 / pH: x  Gluc: x / Ketone: Small  / Bili: Negative / Urobili: <2 mg/dL   Blood: x / Protein: Trace / Nitrite: Negative   Leuk Esterase: Small / RBC: 3 /HPF / WBC 8 /HPF   Sq Epi: x / Non Sq Epi: 3 /HPF / Bacteria: Moderate        RADIOLOGY & ADDITIONAL TESTS:    Imaging Personally Reviewed:  [x] YES  [ ] NO    Will obtain old records:  [ ] YES  [x] NO        
Patient is a 50y old  Female who presents with a chief complaint of Weakness of extremities (17 Aug 2021 11:51)      HPI:  51 y/o female (Vietnamese speaking, translation provided by daughter at bedside) with PMH of T2DM not on prescribe medications, HLD, B12 deficiency with neuropathy, migraines presented to ED with acute weakness, tingling of upper and lower extremities, right > left. Patient states she was in her usual state of health when she developed a headache this morning and she started feeling the weakness to the point she had difficulty walking. She had tingling sensation throughout all 4 extremities for the past 2 days prior to today.     In ED, BP was 160-118 and CT brain was negative for acute pathology.       (14 Aug 2021 15:40)    awaiting c spine mri  reports she is doing exercise in the bed  reports some minimal improvement   MBS done yesterday, recommended for soft solids with thin liquids as tolerated  also recommended for GI consult     REVIEW OF SYSTEMS  Constitutional - No fever, No weight loss, No fatigue  HEENT - No eye pain, No visual disturbances, No difficulty hearing, No tinnitus, No vertigo, No neck pain  Respiratory - No cough, No wheezing, No shortness of breath  Cardiovascular - No chest pain, No palpitations  Gastrointestinal - No abdominal pain, No nausea, No vomiting, No diarrhea, No constipation  Genitourinary - No dysuria, No frequency, No hematuria, No incontinence  Neurological - No headaches, No memory loss, + loss of strength, + numbness, No tremors  Skin - No itching, No rashes, No lesions   Endocrine - No temperature intolerance  Musculoskeletal - No joint pain, No joint swelling, No muscle pain  Psychiatric - No depression, No anxiety    PAST MEDICAL & SURGICAL HISTORY  DM (diabetes mellitus)    HLD (hyperlipidemia)    Migraine    No significant past surgical history         CURRENT FUNCTIONAL STATUS  Bed Mobility: Sit to Supine:     · Level of [place]	minimum assist (75% patients effort)  · Physical Assist/Nonphysical Assist	1 person assist; verbal cues    Bed Mobility: Supine to Sit:     · Level of [place]	minimum assist (75% patients effort)  · Physical Assist/Nonphysical Assist	1 person assist; verbal cues    Bed Mobility Analysis:     · Impairments Contributing to Impaired Bed Mobility	decreased strength    Transfer: Sit to Stand:     · Level of [place]	minimum assist (75% patients effort)  · Physical Assist/Nonphysical Assist	1 person assist; verbal cues  · Weight-Bearing Restrictions	full weight-bearing    Transfer: Stand to Sit:     · Level of [place]	minimum assist (75% patients effort)  · Physical Assist/Nonphysical Assist	1 person assist; verbal cues  · Weight-Bearing Restrictions	full weight-bearing    Sit/Stand Transfer Safety Analysis:     · Impairments Contributing to Impaired Transfers	decreased strength    Gait Skills:     · Level of [place]	minimum assist (75% patients effort)  · Physical Assist/Nonphysical Assist	1 person assist; verbal cues  · Weight-Bearing Restrictions	full weight-bearing  · Gait Distance	5 lateral steps       RECENT LABS/IMAGING  · Diagnostic Impressions	1. Functional oral management given puree, mechanical soft and solid texture marked by adequate bolus collection, transit and transport. Slowed mastication noted with solid texture, though with ability to break down solid. 2. Piecemeal AP transport characterized by two swallows given thin liquid trials. Trace-mild lingual stasis lining lingual surface though cleared with clinician cue to initiate subsequent saliva swallow. 3. Pharyngeal stage marked by timely initiation of the pharyngeal swallow trigger, complete tongue base retraction, complete epiglottic deflection, adequate hyolaryngeal excursion, adequate pharyngeal contractibility, and complete closure of the laryngeal vestibule given thin liquids, puree, mechanical soft, and soft solid texture. No aspiration viewed.   Of note: Patient was observed to cough in the absence of contrast in the airway throughout the study.  · Recommended Consistencies	1. Soft solid with thin liquids, as tolerated.  · Recommended Feeding/Eating Techniques	alternate food with liquid; position upright (90 degrees)  · Feeding Assistance	no assistance needed  · Aspiration Precautions	yes  · Monitor for Signs of Aspiration	change of breathing pattern; oral hygiene; position upright (90Y); cough; gurgly voice; fever; pneumonia; throat clearing; upper respiratory infection  · Demonstrates Need for Referral to Another Service	GI  · Additional Recommendations	1. GI consult warranted due to complaints of burning sensation and 'tightness' , pointing to esophagus and coughing in the absence of contrast in the airway.  2. Please re-consult this service as necessary      CBC Full  -  ( 17 Aug 2021 08:54 )  WBC Count : 5.71 K/uL  RBC Count : 4.69 M/uL  Hemoglobin : 13.5 g/dL  Hematocrit : 41.2 %  Platelet Count - Automated : 309 K/uL  Mean Cell Volume : 87.8 fL  Mean Cell Hemoglobin : 28.8 pg  Mean Cell Hemoglobin Concentration : 32.8 gm/dL  Auto Neutrophil # : x  Auto Lymphocyte # : x  Auto Monocyte # : x  Auto Eosinophil # : x  Auto Basophil # : x  Auto Neutrophil % : x  Auto Lymphocyte % : x  Auto Monocyte % : x  Auto Eosinophil % : x  Auto Basophil % : x        142  |  108<H>  |  15  ----------------------------<  146<H>  3.7   |  23  |  0.67    Ca    9.5      17 Aug 2021 08:54  Phos  3.4       Mg     2.10           Urinalysis Basic - ( 16 Aug 2021 18:26 )    Color: Yellow / Appearance: Clear / S.030 / pH: x  Gluc: x / Ketone: Small  / Bili: Negative / Urobili: <2 mg/dL   Blood: x / Protein: Trace / Nitrite: Negative   Leuk Esterase: Small / RBC: 3 /HPF / WBC 8 /HPF   Sq Epi: x / Non Sq Epi: 3 /HPF / Bacteria: Moderate        VITALS  T(C): 36.7 (21 @ 05:45), Max: 37 (21 @ 17:45)  HR: 80 (21 @ 05:45) (65 - 80)  BP: 122/74 (21 @ 05:45) (108/70 - 129/74)  RR: 17 (21 @ 05:45) (17 - 18)  SpO2: 100% (21 @ 05:45) (97% - 100%)  Wt(kg): --    ALLERGIES  penicillin (Unknown)      MEDICATIONS   aspirin  chewable 81 milliGRAM(s) Oral daily  atorvastatin 80 milliGRAM(s) Oral at bedtime  benzocaine 15 mG/menthol 3.6 mG (Sugar-Free) Lozenge 1 Lozenge Oral every 6 hours PRN  dextrose 40% Gel 15 Gram(s) Oral once  dextrose 5% + sodium chloride 0.9%. 1000 milliLiter(s) IV Continuous <Continuous>  dextrose 5%. 1000 milliLiter(s) IV Continuous <Continuous>  dextrose 5%. 1000 milliLiter(s) IV Continuous <Continuous>  dextrose 50% Injectable 25 Gram(s) IV Push once  dextrose 50% Injectable 12.5 Gram(s) IV Push once  dextrose 50% Injectable 25 Gram(s) IV Push once  glucagon  Injectable 1 milliGRAM(s) IntraMuscular once  insulin lispro (ADMELOG) corrective regimen sliding scale   SubCutaneous three times a day before meals  insulin lispro (ADMELOG) corrective regimen sliding scale   SubCutaneous at bedtime  pantoprazole  Injectable 40 milliGRAM(s) IV Push daily      ----------------------------------------------------------------------------------------  PHYSICAL EXAM  Constitutional - NAD, Comfortable  HEENT - NCAT, EOMI  Chest - no respiratory distress  Cardiovascular - RRR, S1S2   Abdomen -  Soft, NTND  Extremities - No C/C/E, No calf tenderness   Neurologic Exam -                    Cognitive - Awake, Alert, AAO to self, place, date, year, situation     Communication - Fluent, No dysarthria     Cranial Nerves -trace R facial weakness     Motor -                      LEFT    UE - ShAB 5/5, EF 5/5, EE 5/5, WE 5/5,  5/5                    RIGHT UE - ShAB 2/5, EF 2/5, EE 1/5, WE 3/5,  2/5 (pt reports limited by pain) improved hand opening today                    LEFT    LE - HF 5/5, KE 5/5, DF 5/5, PF 5/5                    RIGHT LE - HF 1+/5, KE 0/5, DF 0/5, PF 0/5        Sensory - impaired to LT on the right     Reflexes - DTR Intact at patella     OculoVestibular - No saccades, No nystagmus, VOR         Balance - WNL Static  Psychiatric - Mood stable, Affect WNL  ----------------------------------------------------------------------------------------  ASSESSMENT/PLAN 51 yo F p/w R sided weakness and sensory changes, MRI brain negative.   MRI c spine pending   TTE with bubble pending  SLP- cleared for soft solids and thin liquids  continue bedside PT and OT for ambulation, bed mobility, transfers, adls  Pain -consider tylenol prn  Rehab -  patient with R sided weakness and sensory changes, requires assistance for mobility, previously independent  diagnosis pending   workup in progress, anticipate patient would benefit from acute inpatient rehab, patient can tolerate 3 hrs/day of therapy with medical supervision.  will monitor for progress with bedside therapy      
Patient is a 50y old  Female who presents with a chief complaint of Weakness of extremities (17 Aug 2021 12:09)      HPI:  51 y/o female (Frisian speaking, translation provided by daughter at bedside) with PMH of T2DM not on prescribe medications, HLD, B12 deficiency with neuropathy, migraines presented to ED with acute weakness, tingling of upper and lower extremities, right > left. Patient states she was in her usual state of health when she developed a headache this morning and she started feeling the weakness to the point she had difficulty walking. She had tingling sensation throughout all 4 extremities for the past 2 days prior to today.     In ED, BP was 160-118 and CT brain was negative for acute pathology.       (14 Aug 2021 15:40)    MRI c spine done yesterday. Patient reports some improvement in RUE strength.    REVIEW OF SYSTEMS  Constitutional - No fever, No weight loss, No fatigue  HEENT - No eye pain, No visual disturbances, No difficulty hearing, No tinnitus, No vertigo, No neck pain  Respiratory - No cough, No wheezing, No shortness of breath  Cardiovascular - No chest pain, No palpitations  Gastrointestinal - No abdominal pain, No nausea, No vomiting, No diarrhea, No constipation  Genitourinary - No dysuria, No frequency, No hematuria, No incontinence  Neurological - No headaches, No memory loss, + loss of strength,+ numbness, No tremors  Skin - No itching, No rashes, No lesions   Endocrine - No temperature intolerance  Musculoskeletal -+ pain  Psychiatric - No depression, No anxiety    PAST MEDICAL & SURGICAL HISTORY  DM (diabetes mellitus)    HLD (hyperlipidemia)    Migraine    No significant past surgical history       CURRENT FUNCTIONAL STATUS         RECENT LABS/IMAGING  < from: MR Cervical Spine No Cont (21 @ 21:09) >    EXAM:  MR SPINE CERVICAL        PROCEDURE DATE:  Aug 17 2021         INTERPRETATION:  Clinical indication: Right upper and lower extremity numbness    MRI of the cervical spine was performed using sagittal T1-T2 and STIR sequence. Axial T2 and gradient echo sequences were performed as well    Loss of the normal cervical lordosis is seen. This could be due to positioning or muscle spasm.    The vertebral body height alignment and marrow signal appear normal    Disc desiccation seen from C2 through C3 4 C4-5 C5-6 C6-7 levels these findings are secondary to chronic degenerative change    C2-3: Disc bulge is identified. Hypertrophic change is seen involving the left facet joint. Moderate to severe narrowing of the left neural foramen.    C3-4: Disc bulge and bilateral hypertrophic facet joint changes. Moderate narrowing of the right neural foramen and moderate to severe narrowing of the left neural foramen.    C4-5: Grade 1 anterolisthesis is seen. Hypertrophic changes seen involving left facet joint. Moderate narrowing of the left neural foramen    C5-6: Disc bulge is identified. No significant compromise of the spinal canal or either neural foramen.    C6-7: Disc bulge is seen. Central disc disc protrusion is seen. This finding does extend inferiorly behind the superior endplate of C7. No significant compromise of the spinal canal or either neural foramen.    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 The cyst on the left side measures approximately 6.3 mm and cyst on the right side measures approximately 5.2 mm.    T1-2: There is nerve root sleeve cyst seen on the left side that measures approximate 5.1 mm and on the right side measures approximately 3.9 mm.    The spinal cord demonstrates normal signal and caliber.    Evaluation of the paraspinal soft tissues appear normal.    IMPRESSION: Degenerative changes as described above.    --- End of Report ---        NORBERTO VILLAVICENCIO MD; Attending Radiologist    < end of copied text >    CBC Full  -  ( 18 Aug 2021 08:05 )  WBC Count : 5.43 K/uL  RBC Count : 4.58 M/uL  Hemoglobin : 13.0 g/dL  Hematocrit : 40.1 %  Platelet Count - Automated : 301 K/uL  Mean Cell Volume : 87.6 fL  Mean Cell Hemoglobin : 28.4 pg  Mean Cell Hemoglobin Concentration : 32.4 gm/dL  Auto Neutrophil # : x  Auto Lymphocyte # : x  Auto Monocyte # : x  Auto Eosinophil # : x  Auto Basophil # : x  Auto Neutrophil % : x  Auto Lymphocyte % : x  Auto Monocyte % : x  Auto Eosinophil % : x  Auto Basophil % : x    08-18    143  |  111<H>  |  11  ----------------------------<  150<H>  3.7   |  20<L>  |  0.59    Ca    9.1      18 Aug 2021 08:05  Phos  3.5     08-18  Mg     2.00     08-18      Urinalysis Basic - ( 16 Aug 2021 18:26 )    Color: Yellow / Appearance: Clear / S.030 / pH: x  Gluc: x / Ketone: Small  / Bili: Negative / Urobili: <2 mg/dL   Blood: x / Protein: Trace / Nitrite: Negative   Leuk Esterase: Small / RBC: 3 /HPF / WBC 8 /HPF   Sq Epi: x / Non Sq Epi: 3 /HPF / Bacteria: Moderate        VITALS  T(C): 36.5 (21 @ 10:00), Max: 36.8 (21 @ 16:15)  HR: 64 (21 @ 10:00) (64 - 79)  BP: 125/72 (21 @ 10:00) (115/80 - 125/75)  RR: 15 (21 @ 10:00) (15 - 18)  SpO2: 100% (21 @ 10:00) (99% - 100%)  Wt(kg): --    ALLERGIES  penicillin (Unknown)      MEDICATIONS   artificial tears (preservative free) Ophthalmic Solution 1 Drop(s) Both EYES two times a day  aspirin  chewable 81 milliGRAM(s) Oral daily  atorvastatin 80 milliGRAM(s) Oral at bedtime  benzocaine 15 mG/menthol 3.6 mG (Sugar-Free) Lozenge 1 Lozenge Oral every 6 hours PRN  dextrose 40% Gel 15 Gram(s) Oral once  dextrose 5% + sodium chloride 0.9%. 1000 milliLiter(s) IV Continuous <Continuous>  dextrose 5%. 1000 milliLiter(s) IV Continuous <Continuous>  dextrose 5%. 1000 milliLiter(s) IV Continuous <Continuous>  dextrose 50% Injectable 25 Gram(s) IV Push once  dextrose 50% Injectable 12.5 Gram(s) IV Push once  dextrose 50% Injectable 25 Gram(s) IV Push once  glucagon  Injectable 1 milliGRAM(s) IntraMuscular once  insulin lispro (ADMELOG) corrective regimen sliding scale   SubCutaneous three times a day before meals  insulin lispro (ADMELOG) corrective regimen sliding scale   SubCutaneous at bedtime  pantoprazole  Injectable 40 milliGRAM(s) IV Push daily      ----------------------------------------------------------------------------------------  PHYSICAL EXAM  Constitutional - NAD, Comfortable  HEENT - NCAT, EOMI  Chest - no respiratory distress  Cardiovascular - RRR, S1S2   Abdomen -  Soft, NTND  Extremities - No C/C/E, No calf tenderness   Neurologic Exam -                    Cognitive - Awake, Alert, AAO to self, place, date, year, situation     Communication - Fluent, No dysarthria     Cranial Nerves -trace R facial weakness     Motor -                      LEFT    UE - ShAB 5/5, EF 5/5, EE 5/5, WE 5/5,  5/5                    RIGHT UE - ShAB 3/5, EF 3/5, EE 3/5, WE 3/5,  2/5                      LEFT    LE - HF 5/5, KE 5/5, DF 5/5, PF 5/5                    RIGHT LE - HF 1+/5, KE 0/5, DF 0/5, PF 0/5        Sensory - impaired to LT on the right     Reflexes - DTR Intact at patella     OculoVestibular - No saccades, No nystagmus, VOR         Balance - WNL Static  Psychiatric - Mood stable, Affect WNL  ----------------------------------------------------------------------------------------  ASSESSMENT/PLAN 49 yo F p/w R sided weakness and sensory changes, MRI brain negative.  consider other diagnosis including conversion disorder  SLP- soft solids and thin liquids  continue bedside PT and OT for ambulation, bed mobility, transfers, adls  Pain -consider tylenol prn  Rehab -  patient with R sided weakness and sensory changes, requires assistance for mobility, previously independent  Rehab-  acute inpatient rehab, patient can tolerate 3 hrs/day of therapy with medical supervision.  will monitor for progress with bedside therapy        
Family member present @ bedside to help with translation  no changes in the degree or location of the rt UE/LE numbness  denies acute back pain but pt states she has experienced back pain in the past     Vital Signs Last 24 Hrs  T(C): 36.4 (16 Aug 2021 10:03), Max: 37.1 (15 Aug 2021 20:45)  T(F): 97.6 (16 Aug 2021 10:03), Max: 98.8 (15 Aug 2021 20:45)  HR: 68 (16 Aug 2021 10:03) (65 - 77)  BP: 120/77 (16 Aug 2021 10:03) (111/70 - 128/60)  BP(mean): --  RR: 17 (16 Aug 2021 10:03) (17 - 18)  SpO2: 100% (16 Aug 2021 10:03) (97% - 100%)    I&O's Summary      GENERAL: NAD, well-developed  HEENT: EOMI, NCAT  NECK: Supple, No JVD  CHEST/LUNG: Clear to auscultation bilaterally; Nl work of breathing  HEART: Regular rate and rhythm; Nl S1/S2; No murmurs, rubs, or gallops  ABDOMEN: Soft, Nontender, Nondistended; Bowel sounds present; No hepatosplenomegaly  EXTREMITIES:  2+ Peripheral Pulses, No clubbing, cyanosis, or edema  SKIN: No rashes or lesions; Normal turgor, texture  NEURO: (+)paresthesias rt > lt UE + LE; 0-2/5 strength rt side; 5/5 on the left; (-) Babinski's; speech/comprehension/CNs are intact; no hyperflexia  PSYCH: Nl affect; no agitation or delirium; no suicidal or homicidal ideation    LABS:                        14.3   6.51  )-----------( 308      ( 15 Aug 2021 07:21 )             44.2     08-15    141  |  104  |  14  ----------------------------<  144<H>  3.6   |  22  |  0.85    Ca    9.9      15 Aug 2021 06:50  Phos  5.0     08-15  Mg     2.30     08-15    TPro  7.2  /  Alb  4.7  /  TBili  0.7  /  DBili  x   /  AST  20  /  ALT  22  /  AlkPhos  92  08-15      CAPILLARY BLOOD GLUCOSE      POCT Blood Glucose.: 148 mg/dL (16 Aug 2021 11:34)  POCT Blood Glucose.: 145 mg/dL (16 Aug 2021 06:26)  POCT Blood Glucose.: 123 mg/dL (16 Aug 2021 00:23)  POCT Blood Glucose.: 114 mg/dL (15 Aug 2021 22:42)  POCT Blood Glucose.: 115 mg/dL (15 Aug 2021 17:54)    CARDIAC MARKERS ( 14 Aug 2021 23:15 )  x     / x     / 99 U/L / x     / <1.0 ng/mL          RADIOLOGY & ADDITIONAL TESTS:    Imaging Personally Reviewed:  [x] YES  [ ] NO    Will obtain old records:  [ ] YES  [x] NO

## 2021-08-19 NOTE — H&P ADULT - NSHPSOCIALHISTORY_GEN_ALL_CORE
Smoking - Denied  EtOH - Denied   Drugs - Denied    FUNCTIONAL HISTORY  Lives in private house with family, has 4 stairs outside to enter, 1 flight to 2nd floor however can stay on main level  Independent at baseline  works as high school     Current functional status   CG for transfers and bed mobility   min A x 1 for ambulation 15ft   LBD min A

## 2021-08-19 NOTE — H&P ADULT - ASSESSMENT
Assessment/Plan:  CAROL MC is a 50y with a history of T2DM (not on prescribe medications), HLD, B12 deficiency with neuropathy, migraines  who presented to [facility] on 8/14 with complaint of acute weakness, tingling of upper and lower extremities, right > left. Stroke w/u negative. PM&R consulted, with thought that symptoms may be related to conversion disorder and felt patient would benefit from acute IPR. Now admitted to [facility] after for initiation of a multidisciplinary rehab program consisting focused on functional mobility, transfers and ADLs (activities of daily living).      Comprehensive Multidisciplinary Rehab Program:  - Start comprehensive rehab program, 3 hours a day, 5 days a week.  - PT 1hr/day: Focused on improving strength, endurance, coordination, balance, functional mobility, and transfers  - OT 1hr/day: Focused on improving strength, fine motor skills, coordination, posture and ADLs.    - Speech Therapy 1hr/day: to diagnose and treat deficits in swallowing, cognition and communication.   - P&O as needed  - Activity Limitations: Decreased social, vocational and leisure activities, decreased self care and ADLs, decreased mobility, decreased ability to manage household and finances.     Participation Restrictions/Precautions:  - Weight bearing status: n/a  - ROM restrictions: n/a  - Precautions:    [ x] Falls   [ ] Spinal   [ ] Hip (Anterior or Posterior)       [ ] Seizure  [ ] Cardiac   [ ] Sternal    Rehab Diagnosis/Management:    Sleep:   - Maintain quiet hours and low stim environment.    Pain Management:  - Tylenol PRN  - Avoid sedating medications that may interfere with cognitive recovery    GI/Bowel:  - At risk for constipation due to neurologic diagnosis, immobility and/or medication use  - Senna QHS  - GI ppx: protonix     /Bladder:   - At risk for incontinence and retention due to neurologic diagnosis and limited mobility  - Currently patient voids:    [x ] independent     [ ] external collection device (condom cath)    [ ] Indwelling duong catheter     [ ] Intermittent catheterization  - Baseline bladder scan, if > 350cc residual will start ISCs   - Encourage timed voids every 4 hours while awake for independence and to promote continence during therapy.    Skin/Pressure Injury:   - At risk for pressure injury due to neurologic diagnosis and relative immobility.  - Skin assessment on admission: ***  - Monitor Incisions: ***  - Turn every 2 hours while in bed, air mattress  - Soft heel protectors  - Skin barrier cream as needed  - Nursing to monitor skin Qshift    Diet/Dysphagia:  - Diet Consistency/Modifications: DASH      DVT ppx:  - lovenox   - SCDs  - Last Doppler on n/a    -------------  Comorbid Medical Management:      DM type 2   -HA1C 7.2% on 8/14  -SSI and monitor accuchecks  -Hospitalist consult     HLD  -Lipitor    Migraines   -On now medications as this time   -Tylenol prn     B12 deficiency with neuropathy  -B12 level 451  -Folate 20     ---------------  Code Status/Emergency Contact:    Outpatient Follow-up (Specialty/Name of physician):    F/u with Neurology at [phone] as OP      --------------  Goals: Safe discharge to ***  Estimated Length of Stay: 10-14 days  Rehab Potential: Good  Medical Prognosis: Good  Estimated Disposition: Home with Home Care  ---------------    PRESCREEN COMPARISON:  I have reviewed the prescreen information and I have found no relevant changes between the preadmission screening and my post admission evaluation.    RATIONALE FOR INPATIENT ADMISSION: Patient demonstrates the following:  [X] Medically appropriate for rehabilitation admission  [X] Has attainable rehab goals with an appropriate initial discharge plan  [X]Has rehabilitation potential (expected to make a significant improvement within a reasonable period of time)  [X] Requires close medical management by a rehab physician, rehab nursing care, Hospitalist and comprehensive interdisciplinary team (including PT, OT and/or SLP, Prosthetics and Orthotics)       Assessment/Plan:  CAROL MC is a 50y with a history of T2DM (not on prescribe medications), HLD, B12 deficiency with neuropathy, migraines  who presented to [facility] on 8/14 with complaint of acute weakness, tingling of upper and lower extremities, right > left. Stroke w/u negative. PM&R consulted, with thought that symptoms may be related to conversion disorder and felt patient would benefit from acute IPR. Now admitted to [facility] after for initiation of a multidisciplinary rehab program consisting focused on functional mobility, transfers and ADLs (activities of daily living).    Comprehensive Multidisciplinary Rehab Program:  - Start comprehensive rehab program, 3 hours a day, 5 days a week.  - PT 1hr/day: Focused on improving strength, endurance, coordination, balance, functional mobility, and transfers  - OT 1hr/day: Focused on improving strength, fine motor skills, coordination, posture and ADLs.    - Speech Therapy 1hr/day: to diagnose and treat deficits in swallowing, cognition and communication.   - P&O as needed  - Activity Limitations: Decreased social, vocational and leisure activities, decreased self care and ADLs, decreased mobility, decreased ability to manage household and finances.     Participation Restrictions/Precautions:  - Weight bearing status: n/a  - ROM restrictions: n/a  - Precautions: Falls      Rehab Diagnosis/Management:    Sleep:   - Maintain quiet hours and low stim environment.    Pain Management:  - Tylenol PRN  - Avoid sedating medications that may interfere with cognitive recovery    GI/Bowel:  - At risk for constipation due to neurologic diagnosis, immobility and/or medication use  - Senna QHS  - GI ppx: protonix     /Bladder:   - At risk for incontinence and retention due to neurologic diagnosis and limited mobility  - Currently patient voids:    [x ] independent     [ ] external collection device (condom cath)    [ ] Indwelling duong catheter     [ ] Intermittent catheterization  - Baseline bladder scan, if > 350cc residual will start ISCs   - Encourage timed voids every 4 hours while awake for independence and to promote continence during therapy.  - Primafit nghtly    Skin/Pressure Injury:   - At risk for pressure injury due to neurologic diagnosis and relative immobility.  - Skin assessment on admission: No breakdwon  - Turn every 2 hours while in bed, air mattress  - Soft heel protectors  - Skin barrier cream as needed  - Nursing to monitor skin Qshift    #Diet/Dysphagia:  - Diet; Soft    #DVT ppx:  - lovenox   - SCDs  - Last Doppler on n/a  -------------  Comorbid Medical Management:    #DM type 2   -HA1C 7.2% on 8/14  -SSI and monitor accu checks  -Hospitalist consult     #HLD  -Lipitor    Migraines   -On no medications as this time   -Tylenol prn     #B12 deficiency with neuropathy  -B12 level 451  -Folate 20     ---------------  Code Status/Emergency Contact:    Outpatient Follow-up (Specialty/Name of physician):    F/u with Neurology at [phone] as OP    --------------  Goals: Discharge to home  Estimated Length of Stay: 10-14 days  Rehab Potential: Good  Medical Prognosis: Good  Estimated Disposition: Home with Home Care  ---------------    PRESCREEN COMPARISON:  I have reviewed the prescreen information and I have found no relevant changes between the preadmission screening and my post admission evaluation.    RATIONALE FOR INPATIENT ADMISSION: Patient demonstrates the following:  [X] Medically appropriate for rehabilitation admission  [X] Has attainable rehab goals with an appropriate initial discharge plan  [X]Has rehabilitation potential (expected to make a significant improvement within a reasonable period of time)  [X] Requires close medical management by a rehab physician, rehab nursing care, Hospitalist and comprehensive interdisciplinary team (including PT, OT and/or SLP, Prosthetics and Orthotics)

## 2021-08-19 NOTE — H&P ADULT - HISTORY OF PRESENT ILLNESS
This is a 49 y/o female with PMH of T2DM not on prescribe medications, HLD, B12 deficiency with neuropathy, migraines presented to ED at [facility] on 8/14 with acute weakness, tingling of upper and lower extremities, right > left. In ED, BP was 160/118 and CT brain was negative for acute pathology. Patient was admitted to telemetry. MRI head w/o contrast: no acute findings. TTE results: normal. ASA and lipitor started.   On 8/16, ENT consulted for burning and tightness while eating. Scoped patient and did not find an acute pathology to cause these symptoms.   PM&R had been following patient. Stroke work up was negative and physiatrist felt symptoms may be related to conversion syndrome. PT and OT saw patient. Patient deemed a candidate for acute IPR at [facility] and transferred on 8/19.

## 2021-08-19 NOTE — DISCHARGE NOTE NURSING/CASE MANAGEMENT/SOCIAL WORK - NSDCPEFALRISK_GEN_ALL_CORE
For information on Fall & injury Prevention, visit [email or web address]

## 2021-08-19 NOTE — PROGRESS NOTE ADULT - PROVIDER SPECIALTY LIST ADULT
Rehab Medicine
Internal Medicine
Rehab Medicine
Rehab Medicine
Internal Medicine

## 2021-08-19 NOTE — H&P ADULT - ATTENDING COMMENTS
Rehab Attending- Patient seen and examined by me- Case discussed, above hx , assessment and plan reviewed by me with modifications made    Patient feels overall strength has improved  she is walking to bathroom with supervision  patient wants to go home by Friday  patient concerned about these symptoms happening again and what to do to prevent this  no BM since last Friday but didn't start to eat until Wednesday- taking in yogurt only  voiding  No dizziness, mild headache, no chest pain, no SOB ,no further palpitations  No nausea, no vomiting    MEDICATIONS  (STANDING):  artificial  tears Solution 1 Drop(s) Both EYES two times a day  aspirin enteric coated 81 milliGRAM(s) Oral daily  atorvastatin 80 milliGRAM(s) Oral at bedtime  cyanocobalamin 1000 MICROGram(s) Oral daily  dextrose 40% Gel 15 Gram(s) Oral once  dextrose 5%. 1000 milliLiter(s) (50 mL/Hr) IV Continuous <Continuous>  dextrose 5%. 1000 milliLiter(s) (100 mL/Hr) IV Continuous <Continuous>  dextrose 50% Injectable 25 Gram(s) IV Push once  dextrose 50% Injectable 12.5 Gram(s) IV Push once  dextrose 50% Injectable 25 Gram(s) IV Push once  enoxaparin Injectable 40 milliGRAM(s) SubCutaneous daily  glucagon  Injectable 1 milliGRAM(s) IntraMuscular once  insulin lispro (ADMELOG) corrective regimen sliding scale   SubCutaneous three times a day before meals  insulin lispro (ADMELOG) corrective regimen sliding scale   SubCutaneous at bedtime  pantoprazole    Tablet 40 milliGRAM(s) Oral before breakfast  senna 2 Tablet(s) Oral at bedtime    MEDICATIONS  (PRN):  acetaminophen   Tablet .. 650 milliGRAM(s) Oral every 6 hours PRN Mild Pain (1 - 3)  benzocaine 15 mG/menthol 3.6 mG (Sugar-Free) Lozenge 1 Lozenge Oral every 6 hours PRN Sore Throat  polyethylene glycol 3350 17 Gram(s) Oral daily PRN Constipation                            14.3   6.38  )-----------( 299      ( 20 Aug 2021 05:45 )             43.0     08-20    142  |  106  |  10  ----------------------------<  131<H>  3.5   |  26  |  0.79    Ca    9.5      20 Aug 2021 05:45    TPro  7.0  /  Alb  3.8  /  TBili  0.7  /  DBili  x   /  AST  16  /  ALT  36  /  AlkPhos  84  08-20        CAPILLARY BLOOD GLUCOSE      POCT Blood Glucose.: 140 mg/dL (20 Aug 2021 11:49)  POCT Blood Glucose.: 123 mg/dL (20 Aug 2021 07:41)  POCT Blood Glucose.: 106 mg/dL (19 Aug 2021 22:15)  POCT Blood Glucose.: 117 mg/dL (19 Aug 2021 18:44)  POCT Blood Glucose.: 104 mg/dL (19 Aug 2021 16:37)      Vital Signs Last 24 Hrs  T(C): 36.6 (20 Aug 2021 08:43), Max: 36.8 (19 Aug 2021 18:56)  T(F): 97.8 (20 Aug 2021 08:43), Max: 98.2 (19 Aug 2021 18:56)  HR: 60 (20 Aug 2021 08:43) (60 - 76)  BP: 116/73 (20 Aug 2021 08:43) (116/73 - 142/76)  BP(mean): --  RR: 16 (20 Aug 2021 08:43) (16 - 17)  SpO2: 98% (20 Aug 2021 08:43) (96% - 100%)    Phys Exam As Above  Awake Alert speaks fluent english  no facial droop- able to protrude tongue minimally- no dysarthria  All extremities with FROM with MS > 3/5- breaks all muscle groups on resistance  Plantars downgoing  Good long sitting balance    will add miralax daily to bowel program  Psych consulted To confirm conversion disorder diagnosis- ? depression  to continue intensive rehab program

## 2021-08-19 NOTE — H&P ADULT - NSHPPHYSICALEXAM_GEN_ALL_CORE
PHYSICAL EXAMINATION   VItals: T(C): 36.8 (08-19-21 @ 18:56), Max: 36.8 (08-19-21 @ 18:56)  HR: 76 (08-19-21 @ 18:56) (62 - 78)  BP: 142/76 (08-19-21 @ 18:56) (102/66 - 142/76)  RR: 16 (08-19-21 @ 18:56) (16 - 18)  SpO2: 96% (08-19-21 @ 18:56) (96% - 100%)    General: NAD, very emotional and teary                               HEENT: NC/AT, EOM I, PERRLA, Normal Conjunctivae  Cardio: RRR, Normal S1-S2, No M/G/R                              Pulm: No Respiratory Distress,  diminished Lung sounds                        Abdomen: ND/NT, Soft, hypoactive BS+                                                MSK: No joint swelling, Full ROM                                         Ext: No C/C/E, Pulses 2+ throughout, No calf tenderness    Skin:  all skin intact                                                                 Wounds: none  Decubitus Ulcers: None Present     Neurological Examination    Cognitive: AAO x 3                                                                         Attention: Intact   Judgment: Good evidence of judgement                               Memory: Recall 3 objects immediate and 3 min later      Mood/Affect: wnl                                                                           Communication:  Fluent,  No dysarthria   CN II - XII  intact  Coordination: FTN intact on left side. unable to perform with right hand                                                                            Sensory: impaired  to light touch on right side                                                                                              Tone: normal Throughout   Balance: impaired    Motor    LEFT    UE: SF [5/5], EF [5/5], EE [5/5],  [wnl]  RIGHT UE: SF [3-/5], EF [3-/5], EE [3-/5],  [wnl]  LEFT    LE:  HF [5/5], KE [5/5], DF [5/5],   PF [5/5]  RIGHT LE:  HF [1+/5], KE [0/5], DF [0/5],  PF [0/5]      Reflex:  2 + throughout, Babinski negative- left side

## 2021-08-19 NOTE — PROGRESS NOTE ADULT - REASON FOR ADMISSION
Weakness of extremities

## 2021-08-19 NOTE — DISCHARGE NOTE PROVIDER - CARE PROVIDER_API CALL
José Luis Jimenez)  Internal Medicine  2 [facility], [address] [place] [address], [address] [place] [address]  Phone: [phone]  Fax: [phone]  Follow Up Time:

## 2021-08-20 DIAGNOSIS — I10 ESSENTIAL (PRIMARY) HYPERTENSION: ICD-10-CM

## 2021-08-20 DIAGNOSIS — F44.9 DISSOCIATIVE AND CONVERSION DISORDER, UNSPECIFIED: ICD-10-CM

## 2021-08-20 DIAGNOSIS — E78.5 HYPERLIPIDEMIA, UNSPECIFIED: ICD-10-CM

## 2021-08-20 DIAGNOSIS — G43.909 MIGRAINE, UNSPECIFIED, NOT INTRACTABLE, WITHOUT STATUS MIGRAINOSUS: ICD-10-CM

## 2021-08-20 DIAGNOSIS — E11.9 TYPE 2 DIABETES MELLITUS WITHOUT COMPLICATIONS: ICD-10-CM

## 2021-08-20 DIAGNOSIS — Z29.9 ENCOUNTER FOR PROPHYLACTIC MEASURES, UNSPECIFIED: ICD-10-CM

## 2021-08-20 DIAGNOSIS — N17.9 ACUTE KIDNEY FAILURE, UNSPECIFIED: ICD-10-CM

## 2021-08-20 DIAGNOSIS — E53.8 DEFICIENCY OF OTHER SPECIFIED B GROUP VITAMINS: ICD-10-CM

## 2021-08-20 DIAGNOSIS — I63.9 CEREBRAL INFARCTION, UNSPECIFIED: ICD-10-CM

## 2021-08-20 DIAGNOSIS — R26.2 DIFFICULTY IN WALKING, NOT ELSEWHERE CLASSIFIED: ICD-10-CM

## 2021-08-20 LAB
ALBUMIN SERPL ELPH-MCNC: 3.8 G/DL — SIGNIFICANT CHANGE UP (ref 3.3–5)
ALP SERPL-CCNC: 84 U/L — SIGNIFICANT CHANGE UP (ref 40–120)
ALT FLD-CCNC: 36 U/L — SIGNIFICANT CHANGE UP (ref 10–45)
ANION GAP SERPL CALC-SCNC: 10 MMOL/L — SIGNIFICANT CHANGE UP (ref 5–17)
AST SERPL-CCNC: 16 U/L — SIGNIFICANT CHANGE UP (ref 10–40)
BASOPHILS # BLD AUTO: 0.05 K/UL — SIGNIFICANT CHANGE UP (ref 0–0.2)
BASOPHILS NFR BLD AUTO: 0.8 % — SIGNIFICANT CHANGE UP (ref 0–2)
BILIRUB SERPL-MCNC: 0.7 MG/DL — SIGNIFICANT CHANGE UP (ref 0.2–1.2)
BUN SERPL-MCNC: 10 MG/DL — SIGNIFICANT CHANGE UP (ref 7–23)
CALCIUM SERPL-MCNC: 9.5 MG/DL — SIGNIFICANT CHANGE UP (ref 8.4–10.5)
CHLORIDE SERPL-SCNC: 106 MMOL/L — SIGNIFICANT CHANGE UP (ref 96–108)
CO2 SERPL-SCNC: 26 MMOL/L — SIGNIFICANT CHANGE UP (ref 22–31)
CREAT SERPL-MCNC: 0.79 MG/DL — SIGNIFICANT CHANGE UP (ref 0.5–1.3)
EOSINOPHIL # BLD AUTO: 0.12 K/UL — SIGNIFICANT CHANGE UP (ref 0–0.5)
EOSINOPHIL NFR BLD AUTO: 1.9 % — SIGNIFICANT CHANGE UP (ref 0–6)
GLUCOSE BLDC GLUCOMTR-MCNC: 123 MG/DL — HIGH (ref 70–99)
GLUCOSE BLDC GLUCOMTR-MCNC: 129 MG/DL — HIGH (ref 70–99)
GLUCOSE BLDC GLUCOMTR-MCNC: 130 MG/DL — HIGH (ref 70–99)
GLUCOSE BLDC GLUCOMTR-MCNC: 140 MG/DL — HIGH (ref 70–99)
GLUCOSE SERPL-MCNC: 131 MG/DL — HIGH (ref 70–99)
HCT VFR BLD CALC: 43 % — SIGNIFICANT CHANGE UP (ref 34.5–45)
HGB BLD-MCNC: 14.3 G/DL — SIGNIFICANT CHANGE UP (ref 11.5–15.5)
IMM GRANULOCYTES NFR BLD AUTO: 0.2 % — SIGNIFICANT CHANGE UP (ref 0–1.5)
LYMPHOCYTES # BLD AUTO: 3.7 K/UL — HIGH (ref 1–3.3)
LYMPHOCYTES # BLD AUTO: 58 % — HIGH (ref 13–44)
MCHC RBC-ENTMCNC: 29.2 PG — SIGNIFICANT CHANGE UP (ref 27–34)
MCHC RBC-ENTMCNC: 33.3 GM/DL — SIGNIFICANT CHANGE UP (ref 32–36)
MCV RBC AUTO: 87.8 FL — SIGNIFICANT CHANGE UP (ref 80–100)
MONOCYTES # BLD AUTO: 0.49 K/UL — SIGNIFICANT CHANGE UP (ref 0–0.9)
MONOCYTES NFR BLD AUTO: 7.7 % — SIGNIFICANT CHANGE UP (ref 2–14)
NEUTROPHILS # BLD AUTO: 2.01 K/UL — SIGNIFICANT CHANGE UP (ref 1.8–7.4)
NEUTROPHILS NFR BLD AUTO: 31.4 % — LOW (ref 43–77)
NRBC # BLD: 0 /100 WBCS — SIGNIFICANT CHANGE UP (ref 0–0)
PLATELET # BLD AUTO: 299 K/UL — SIGNIFICANT CHANGE UP (ref 150–400)
POTASSIUM SERPL-MCNC: 3.5 MMOL/L — SIGNIFICANT CHANGE UP (ref 3.5–5.3)
POTASSIUM SERPL-SCNC: 3.5 MMOL/L — SIGNIFICANT CHANGE UP (ref 3.5–5.3)
PROT SERPL-MCNC: 7 G/DL — SIGNIFICANT CHANGE UP (ref 6–8.3)
RBC # BLD: 4.9 M/UL — SIGNIFICANT CHANGE UP (ref 3.8–5.2)
RBC # FLD: 13.8 % — SIGNIFICANT CHANGE UP (ref 10.3–14.5)
SARS-COV-2 RNA SPEC QL NAA+PROBE: SIGNIFICANT CHANGE UP
SODIUM SERPL-SCNC: 142 MMOL/L — SIGNIFICANT CHANGE UP (ref 135–145)
WBC # BLD: 6.38 K/UL — SIGNIFICANT CHANGE UP (ref 3.8–10.5)
WBC # FLD AUTO: 6.38 K/UL — SIGNIFICANT CHANGE UP (ref 3.8–10.5)

## 2021-08-20 PROCEDURE — 99222 1ST HOSP IP/OBS MODERATE 55: CPT

## 2021-08-20 PROCEDURE — 99223 1ST HOSP IP/OBS HIGH 75: CPT | Mod: GC

## 2021-08-20 RX ORDER — POLYETHYLENE GLYCOL 3350 17 G/17G
17 POWDER, FOR SOLUTION ORAL DAILY
Refills: 0 | Status: DISCONTINUED | OUTPATIENT
Start: 2021-08-20 | End: 2021-08-21

## 2021-08-20 RX ORDER — PREGABALIN 225 MG/1
1000 CAPSULE ORAL DAILY
Refills: 0 | Status: DISCONTINUED | OUTPATIENT
Start: 2021-08-20 | End: 2021-08-26

## 2021-08-20 RX ADMIN — PREGABALIN 1000 MICROGRAM(S): 225 CAPSULE ORAL at 13:44

## 2021-08-20 RX ADMIN — Medication 81 MILLIGRAM(S): at 11:50

## 2021-08-20 RX ADMIN — SENNA PLUS 2 TABLET(S): 8.6 TABLET ORAL at 21:57

## 2021-08-20 RX ADMIN — ATORVASTATIN CALCIUM 80 MILLIGRAM(S): 80 TABLET, FILM COATED ORAL at 21:57

## 2021-08-20 RX ADMIN — Medication 650 MILLIGRAM(S): at 00:06

## 2021-08-20 RX ADMIN — Medication 1 DROP(S): at 16:43

## 2021-08-20 RX ADMIN — PANTOPRAZOLE SODIUM 40 MILLIGRAM(S): 20 TABLET, DELAYED RELEASE ORAL at 05:16

## 2021-08-20 RX ADMIN — Medication 1 DROP(S): at 05:16

## 2021-08-20 RX ADMIN — ENOXAPARIN SODIUM 40 MILLIGRAM(S): 100 INJECTION SUBCUTANEOUS at 11:50

## 2021-08-20 NOTE — DIETITIAN INITIAL EVALUATION ADULT. - ADD RECOMMEND
1) Monitor Weights, Intake, Tolerance, Skin, POCT & Labwork   2) Education Provided on Proper Nutrition and Soft (Dysphagia 3-Soft & Bite Sized) Diet 3) Continue Nutrition Plan of Care

## 2021-08-20 NOTE — DIETITIAN INITIAL EVALUATION ADULT. - ORAL INTAKE PTA/DIET HISTORY
Patient Does Follow Vegetarian Diet @Home and Tries to Eat Low Carbohydrate Foods& Limits Concentrated Sweets, Pt Occasionally Fasts for Regilious Reasons & Doesn't Take Vitamin/Supplements @Home     on Consistent Carbohydrate Soft (Dysphagia 3-Soft & Bite Sized) Diet w/ Thin Liquids - Will Discuss w/ Speech Therapy   Declines Nutrition Supplementation & Education Provided on Proper Nutrition and Soft (Dysphagia 3-Soft & Bite Sized) Diet

## 2021-08-20 NOTE — CONSULT NOTE ADULT - PROBLEM SELECTOR RECOMMENDATION 9
Possible conversion disorder  Impaired gait, mobility, ADL  - Comprehensive rehab program of PT/OT  - Fall precautions  - lipid profile (170/139/57/85) 8/15  - continue ASA and statin  - Bowel regimen as per primary team  - Pain meds as per primary team

## 2021-08-20 NOTE — CONSULT NOTE ADULT - SUBJECTIVE AND OBJECTIVE BOX
Patient is a 50y old  Female who presents with a chief complaint of conversion disorder (19 Aug 2021 13:34)      HPI:  This is a 51 y/o female with PMH of T2DM not on prescribe medications, HLD, B12 deficiency with neuropathy, migraines presented to ED at [facility] on 8/14 with acute weakness, tingling of upper and lower extremities, right > left. In ED, BP was 160/118 and CT brain was negative for acute pathology. Patient was admitted to telemetry. MRI head w/o contrast: no acute findings. TTE results: normal. ASA and lipitor started.   On 8/16, ENT consulted for burning and tightness while eating. Scoped patient and did not find an acute pathology to cause these symptoms.   PM&R had been following patient. Stroke work up was negative and physiatrist felt symptoms may be related to conversion syndrome. PT and OT saw patient. Patient deemed a candidate for acute IPR at [facility] and transferred on 8/19.                (19 Aug 2021 13:34)      TODAY:  Patient seen and examined in [facility]  No overnight event      PAST MEDICAL & SURGICAL HISTORY:  DM (diabetes mellitus)    HLD (hyperlipidemia)    Migraine    No significant past surgical history        Father: - at age - with history of   Mother: - at age - with history of           Substance Use (street drugs): (  ) never used  (  ) other:  Tobacco Usage:  (   ) never smoked   (   ) former smoker   (   ) current smoker  (     ) pack year  Alcohol Usage:  Sexual History:   Recent Travel:      ALLERGIES:  Allergies    penicillin (Unknown)    Intolerances            REVIEW OF SYSTEMS:  CONSTITUTIONAL: No fever, weight loss, or fatigue  EYES: No eye pain, visual disturbances, or discharge  RESPIRATORY: No cough, wheezing, chills or hemoptysis; No shortness of breath  CARDIOVASCULAR: No chest pain, palpitations, dizziness, or leg swelling  GASTROINTESTINAL: No abdominal or epigastric pain. No nausea, vomiting, or hematemesis; No diarrhea or constipation. No melena or hematochezia.  GENITOURINARY: No dysuria, frequency, hematuria, or incontinence  NEUROLOGICAL: No headaches, memory loss, loss of strength, numbness, or tremors  SKIN: No itching, burning, rashes, or lesions   MUSCULOSKELETAL: No joint pain or swelling; No muscle, back, or extremity pain  PSYCHIATRIC: No depression, anxiety, mood swings, or difficulty sleeping    ALL OTHER SYSTEMS REVIEWED AND ARE NEGATIVE    VITAL SIGNS:  T(C): 36.6 (08-20-21 @ 08:43), Max: 36.8 (08-19-21 @ 18:56)  HR: 60 (08-20-21 @ 08:43) (60 - 78)  BP: 116/73 (08-20-21 @ 08:43) (116/73 - 142/76)  RR: 16 (08-20-21 @ 08:43) (16 - 17)  SpO2: 98% (08-20-21 @ 08:43) (96% - 100%)  Wt(kg): --Vital Signs Last 24 Hrs  T(C): 36.6 (20 Aug 2021 08:43), Max: 36.8 (19 Aug 2021 18:56)  T(F): 97.8 (20 Aug 2021 08:43), Max: 98.2 (19 Aug 2021 18:56)  HR: 60 (20 Aug 2021 08:43) (60 - 78)  BP: 116/73 (20 Aug 2021 08:43) (116/73 - 142/76)  BP(mean): --  RR: 16 (20 Aug 2021 08:43) (16 - 17)  SpO2: 98% (20 Aug 2021 08:43) (96% - 100%)    PHYSICAL EXAM:  GENERAL: NAD  HENT:  Atraumatic, Normocephalic; No tonsillar erythema, exudates, ulcers, or enlargement; Moist mucous membranes  EYES: EOMI, PERRLA, conjunctiva and sclera clear; no lid-lag  NECK: Supple, No JVD, Normal thyroid  NERVOUS SYSTEM:  Motor Strength 5/5 B/L upper and lower extremities; CN II-XII intact  CHEST/LUNG: Clear to percussion bilaterally; No rales, rhonchi, wheezing, or rubs; normal respiratory effort, no intercostal retractions  HEART: Regular rate and rhythm; No murmurs, rubs, or gallops  ABDOMEN: Soft, Nontender, Nondistended; Bowel sounds present; No HSM  EXTREMITIES:  2+ Peripheral Pulses, No clubbing, cyanosis, or peripheral edema  SKIN: No rashes or lesions; normal temperature and turgor   PSYCH: Appropriate affect; Alert & Oriented x 3    LABS:                        14.3   6.38  )-----------( 299      ( 20 Aug 2021 05:45 )             43.0     08-20    142  |  106  |  10  ----------------------------<  131<H>  3.5   |  26  |  0.79    Ca    9.5      20 Aug 2021 05:45    TPro  7.0  /  Alb  3.8  /  TBili  0.7  /  DBili  x   /  AST  16  /  ALT  36  /  AlkPhos  84  08-20         CAPILLARY BLOOD GLUCOSE      POCT Blood Glucose.: 123 mg/dL (20 Aug 2021 07:41)  POCT Blood Glucose.: 106 mg/dL (19 Aug 2021 22:15)  POCT Blood Glucose.: 117 mg/dL (19 Aug 2021 18:44)  POCT Blood Glucose.: 104 mg/dL (19 Aug 2021 16:37)  POCT Blood Glucose.: 165 mg/dL (19 Aug 2021 11:46)              Previous Consultant(s) Notes Reviewed:  YES  Care Discussed with Consultants/Other Providers YES  Previous Imaging Personally Reviewed:  YES

## 2021-08-20 NOTE — DIETITIAN INITIAL EVALUATION ADULT. - NSPROEDALEARNPREF_GEN_A_NUR
Education Provided on Proper Nutrition and Soft (Dysphagia 3-Soft & Bite Sized) Diet/verbal instruction

## 2021-08-20 NOTE — DIETITIAN INITIAL EVALUATION ADULT. - OTHER INFO
Initial Nutrition Assessment   50yr Old Female   Dx: Conversion Disorder  Diet - Consistent Carbohydrate Soft (Dysphagia 3-Soft & Bite Sized) Diet w/ Thin Liquids   Declines Nutrition Supplementation  Denies Food Allergy/Intolerance  Tolerates Diet Well - No Significant Chewing/Swallowing Complications (Per Patient) - Will Discuss w/ Speech Therapy   No Pressure Ulcers (as Per Nursing Flow Sheets)  No Edema Noted (as Per Nursing Flow Sheets)  No Recent Nausea/Vomiting/Diarrhea & Some Recent Constipation (as Per Patient)

## 2021-08-20 NOTE — DIETITIAN INITIAL EVALUATION ADULT. - PERSON TAUGHT/METHOD
Education Provided on Proper Nutrition and Soft (Dysphagia 3-Soft & Bite Sized) Diet/verbal instruction/patient instructed

## 2021-08-20 NOTE — CHART NOTE - NSCHARTNOTEFT_GEN_A_CORE
[facility] Inter[facility]plinary Plan of Care    REHABILITATION DIAGNOSIS:  Dissociative motor disorder          COMORBIDITIES/COMPLICATING CONDITIONS IMPACTING REHABILITATION:  conversion disorder    HEALTH ISSUES - PROBLEM Dx:  HLD (hyperlipidemia)    Migraine    Vitamin B12 deficiency    Conversion disorder    Inability to walk    DVT prophylaxis    Type 2 diabetes mellitus    ÓSCAR (acute kidney injury)          PAST MEDICAL & SURGICAL HISTORY:  DM (diabetes mellitus)    HLD (hyperlipidemia)    Migraine    No significant past surgical history        Based upon consideration of the patient's impairments, functional status, complicating conditions and any other contributing factors and after information garnered from the assessments of all therapy disciplines involved in treating the patient and other pertinent clinicians:    INTERDISCIPLINARY REHABILITATION INTERVENTIONS:    [ X  ] Transfer Training  [ X  ] Bed Mobility  [ X  ] Therapeutic Exercise  [ X ] Balance/Coordination Exercises  [ X ] Locomotion retraining  [ X  ] Stairs  [  X ] Functional Transfer Training  [   ] Bowel/Bladder program  [   ] Pain Management  [   ] Skin/Wound Care  [   ] Visual/Perceptual Training  [   ] Therapeutic Recreation Activities  [   ] Neuromuscular Re-education  [ X  ] Activities of Daily Living  [   ] Speech Exercise  [   ] Swallowing Exercises  [   ] Vital Stim  [   ] Dietary Supplements  [   ] Calorie Count  [   ] Cognitive Exercises  [   ] Congnitive/Linguistic Treatment  [   ] Behavior Program  [   ] Neuropsych Therapy  [ X  ] Patient/Family Counseling  [ X ] Family Training  [ X  ] Community Re-entry  [   ] Orthotic Evaluation  [   ] Prosthetic Eval/Training    MEDICAL PROGNOSIS:  good    REHAB POTENTIAL:  good  EXPECTED DAILY THERAPY:         PT:1hr       OT:1hr       ST:1hr       P&O:    EXPECTED INTENSITY OF PROGRAM:  3 hrs / Day    EXPECTED FREQUENCY OF PROGRAM: 5 Days/ Week    ESTIMATED LOS:  [  ] 5-7 Days  [ x ] 7-10 Days  [  ] 10- 14 Days  [  ] 14- 18 Days  [  ] 18- 21 Days    ESTIMATED DISPOSITION:  [  ] Home   [  ] Home with Outpatient Therapies  [x  ] Home with Home Therapies  [  ] Assisted Living  [  ] Nursing Home  [  ] Long Term Acute Care    INTERDISCIPLINARY FUNCTIONAL OUTCOMES/GOALS:         Gait/Mobility:7       Transfers:7       ADLs:7       Functional Transfers:7       Medication Management:7       Communication:NA       Cognitive:NA       Dysphagia:7       Bladder7       Bowel:7     Functional Independent Measures:   7 = Independent  6 = Modified Independent  5 = Supervision  4 = Minimal Assist/ Contact Guard  3 = Moderate Assistance  2 = Maximum Assistance  1 = Total Assistance  0 = Unable to assess

## 2021-08-20 NOTE — CONSULT NOTE ADULT - ASSESSMENT
49 yo female with history of T2DM, HLD, B12 deficiency with neuropathy, migraines admitted to  rehab after hospital course for acute weakness, tingling of upper and lower extremities, right > left. BP was 160/118 upon presentation at the ED.

## 2021-08-20 NOTE — DIETITIAN INITIAL EVALUATION ADULT. - EDUCATION DIETARY MODIFICATIONS
Education Provided on Proper Nutrition and Soft (Dysphagia 3-Soft & Bite Sized) Diet/(2) meets goals/outcomes/verbalization

## 2021-08-21 LAB
GLUCOSE BLDC GLUCOMTR-MCNC: 146 MG/DL — HIGH (ref 70–99)
GLUCOSE BLDC GLUCOMTR-MCNC: 147 MG/DL — HIGH (ref 70–99)
GLUCOSE BLDC GLUCOMTR-MCNC: 162 MG/DL — HIGH (ref 70–99)
GLUCOSE BLDC GLUCOMTR-MCNC: 166 MG/DL — HIGH (ref 70–99)

## 2021-08-21 PROCEDURE — 99232 SBSQ HOSP IP/OBS MODERATE 35: CPT

## 2021-08-21 PROCEDURE — 99232 SBSQ HOSP IP/OBS MODERATE 35: CPT | Mod: GC

## 2021-08-21 RX ORDER — POLYETHYLENE GLYCOL 3350 17 G/17G
17 POWDER, FOR SOLUTION ORAL
Refills: 0 | Status: DISCONTINUED | OUTPATIENT
Start: 2021-08-21 | End: 2021-08-26

## 2021-08-21 RX ADMIN — PANTOPRAZOLE SODIUM 40 MILLIGRAM(S): 20 TABLET, DELAYED RELEASE ORAL at 06:37

## 2021-08-21 RX ADMIN — ATORVASTATIN CALCIUM 80 MILLIGRAM(S): 80 TABLET, FILM COATED ORAL at 21:39

## 2021-08-21 RX ADMIN — Medication 2: at 17:16

## 2021-08-21 RX ADMIN — Medication 1 DROP(S): at 06:37

## 2021-08-21 RX ADMIN — Medication 81 MILLIGRAM(S): at 12:33

## 2021-08-21 RX ADMIN — Medication 1 DROP(S): at 17:22

## 2021-08-21 RX ADMIN — PREGABALIN 1000 MICROGRAM(S): 225 CAPSULE ORAL at 12:33

## 2021-08-21 RX ADMIN — POLYETHYLENE GLYCOL 3350 17 GRAM(S): 17 POWDER, FOR SOLUTION ORAL at 12:33

## 2021-08-21 RX ADMIN — ENOXAPARIN SODIUM 40 MILLIGRAM(S): 100 INJECTION SUBCUTANEOUS at 12:33

## 2021-08-21 RX ADMIN — POLYETHYLENE GLYCOL 3350 17 GRAM(S): 17 POWDER, FOR SOLUTION ORAL at 17:16

## 2021-08-22 LAB
GLUCOSE BLDC GLUCOMTR-MCNC: 108 MG/DL — HIGH (ref 70–99)
GLUCOSE BLDC GLUCOMTR-MCNC: 132 MG/DL — HIGH (ref 70–99)
GLUCOSE BLDC GLUCOMTR-MCNC: 138 MG/DL — HIGH (ref 70–99)
GLUCOSE BLDC GLUCOMTR-MCNC: 188 MG/DL — HIGH (ref 70–99)

## 2021-08-22 PROCEDURE — 99232 SBSQ HOSP IP/OBS MODERATE 35: CPT

## 2021-08-22 PROCEDURE — 99232 SBSQ HOSP IP/OBS MODERATE 35: CPT | Mod: GC

## 2021-08-22 RX ADMIN — Medication 1 DROP(S): at 05:02

## 2021-08-22 RX ADMIN — Medication 2: at 12:02

## 2021-08-22 RX ADMIN — Medication 650 MILLIGRAM(S): at 21:29

## 2021-08-22 RX ADMIN — SENNA PLUS 2 TABLET(S): 8.6 TABLET ORAL at 21:26

## 2021-08-22 RX ADMIN — ATORVASTATIN CALCIUM 80 MILLIGRAM(S): 80 TABLET, FILM COATED ORAL at 21:26

## 2021-08-22 RX ADMIN — PANTOPRAZOLE SODIUM 40 MILLIGRAM(S): 20 TABLET, DELAYED RELEASE ORAL at 05:02

## 2021-08-22 RX ADMIN — Medication 81 MILLIGRAM(S): at 12:02

## 2021-08-22 RX ADMIN — PREGABALIN 1000 MICROGRAM(S): 225 CAPSULE ORAL at 12:02

## 2021-08-22 RX ADMIN — ENOXAPARIN SODIUM 40 MILLIGRAM(S): 100 INJECTION SUBCUTANEOUS at 12:01

## 2021-08-22 RX ADMIN — Medication 650 MILLIGRAM(S): at 22:15

## 2021-08-22 RX ADMIN — Medication 1 DROP(S): at 21:26

## 2021-08-22 NOTE — CHART NOTE - NSCHARTNOTEFT_GEN_A_CORE
NUTRITION FOLLOW UP    SOURCE: Patient [X)   Family [ ]    Medical Record (X)    Diet, Dysphagia 3 Soft-Thin Liquids:   Consistent Carbohydrate {Evening Snack}  Lacto-Ovo Veg (Accepts Milk Prod., Eggs) (08-20-21 @ 12:11) [Active]    Consult received for nutrition assessment. PO intake fair/good. Pt declining medical management of DMII. States that she is cutting down on her carbs in order to improve glycemic control. Reports avoiding most starches: pasta, potatoes, bread, rice, beans. Wants to eat mostly vegetables, fruits, likes avocado. Pt also eats only Halal foods, but does not want to consume meat/fish from in-house. Encouraged pt to include alternative protein sources to meet nutritional needs and provide meal balance. Pt receptive only to plain yogurt, (kosher) cottage cheese & diced hard boiled eggs. States that her family will also bring in foods from home (Halal animal protein) when able. Consistent carbohydrate diet reviewed as well as list of B-12 foods given hx of deficiency. Denies GI distress, last BM 8/21.     CURRENT WEIGHT:   (8/19) 179.6lbs     PERTINENT MEDS:   Pertinent Medications: MEDICATIONS  (STANDING):  artificial  tears Solution 1 Drop(s) Both EYES two times a day  aspirin enteric coated 81 milliGRAM(s) Oral daily  atorvastatin 80 milliGRAM(s) Oral at bedtime  cyanocobalamin 1000 MICROGram(s) Oral daily  dextrose 40% Gel 15 Gram(s) Oral once  dextrose 5%. 1000 milliLiter(s) (50 mL/Hr) IV Continuous <Continuous>  dextrose 5%. 1000 milliLiter(s) (100 mL/Hr) IV Continuous <Continuous>  dextrose 50% Injectable 25 Gram(s) IV Push once  dextrose 50% Injectable 12.5 Gram(s) IV Push once  dextrose 50% Injectable 25 Gram(s) IV Push once  enoxaparin Injectable 40 milliGRAM(s) SubCutaneous daily  glucagon  Injectable 1 milliGRAM(s) IntraMuscular once  insulin lispro (ADMELOG) corrective regimen sliding scale   SubCutaneous three times a day before meals  insulin lispro (ADMELOG) corrective regimen sliding scale   SubCutaneous at bedtime  pantoprazole    Tablet 40 milliGRAM(s) Oral before breakfast  polyethylene glycol 3350 17 Gram(s) Oral two times a day  senna 2 Tablet(s) Oral at bedtime    MEDICATIONS  (PRN):  acetaminophen   Tablet .. 650 milliGRAM(s) Oral every 6 hours PRN Mild Pain (1 - 3)  benzocaine 15 mG/menthol 3.6 mG (Sugar-Free) Lozenge 1 Lozenge Oral every 6 hours PRN Sore Throat  bisacodyl 10 milliGRAM(s) Oral daily PRN Constipation      PERTINENT LABS:  08-20 Na142 mmol/L Glu 131 mg/dL<H> K+ 3.5 mmol/L Cr  0.79 mg/dL BUN 10 mg/dL 08-18 Phos 3.5 mg/dL 08-20 Alb 3.8 g/dL 08-15 Chol 170 mg/dL LDL --    HDL 57 mg/dL Trig 139 mg/dL    CAPILLARY BLOOD GLUCOSE      POCT Blood Glucose.: 188 mg/dL (22 Aug 2021 11:58)  POCT Blood Glucose.: 138 mg/dL (22 Aug 2021 08:12)  POCT Blood Glucose.: 146 mg/dL (21 Aug 2021 21:37)  POCT Blood Glucose.: 162 mg/dL (21 Aug 2021 17:10)      SKIN:  no pressure ulcers  EDEMA: none  LAST BM: 8/21    ESTIMATED NEEDS:   [X] no change since previous assessment  [ ] recalculated:     PREVIOUS NUTRITION DIAGNOSIS:    1. Moderate malnutrition- declines oral nutrition supplements. Receptive to plain yogurt TID. Alternative protein sources encouraged.     NUTRITION DIAGNOSIS is :  (X)  Ongoing        NEW NUTRITION DIAGNOSIS: N/A    NUTRITION RECOMMENDATIONS:   1. Continue current nutrition plan of care  2. Preferences on file with nutrition office  3. Consistent carbohydrate diet/B-12 food sources reviewed +written materials  4. Encourage adequate intake at meals     MONITORING AND EVALUATION:   1. Tolerance to diet prescription   2. PO intake  3. Weights  4. Labs  5. Follow Up per protocol     RD to remain available   Blaire Pandey RDN   Pager #[phone]

## 2021-08-23 DIAGNOSIS — F44.9 DISSOCIATIVE AND CONVERSION DISORDER, UNSPECIFIED: ICD-10-CM

## 2021-08-23 DIAGNOSIS — Z65.9 PROBLEM RELATED TO UNSPECIFIED PSYCHOSOCIAL CIRCUMSTANCES: ICD-10-CM

## 2021-08-23 LAB
ALBUMIN SERPL ELPH-MCNC: 3.7 G/DL — SIGNIFICANT CHANGE UP (ref 3.3–5)
ALP SERPL-CCNC: 89 U/L — SIGNIFICANT CHANGE UP (ref 40–120)
ALT FLD-CCNC: 40 U/L — SIGNIFICANT CHANGE UP (ref 10–45)
ANION GAP SERPL CALC-SCNC: 10 MMOL/L — SIGNIFICANT CHANGE UP (ref 5–17)
AST SERPL-CCNC: 22 U/L — SIGNIFICANT CHANGE UP (ref 10–40)
BILIRUB SERPL-MCNC: 0.5 MG/DL — SIGNIFICANT CHANGE UP (ref 0.2–1.2)
BUN SERPL-MCNC: 18 MG/DL — SIGNIFICANT CHANGE UP (ref 7–23)
CALCIUM SERPL-MCNC: 9.3 MG/DL — SIGNIFICANT CHANGE UP (ref 8.4–10.5)
CHLORIDE SERPL-SCNC: 105 MMOL/L — SIGNIFICANT CHANGE UP (ref 96–108)
CO2 SERPL-SCNC: 25 MMOL/L — SIGNIFICANT CHANGE UP (ref 22–31)
CREAT SERPL-MCNC: 0.8 MG/DL — SIGNIFICANT CHANGE UP (ref 0.5–1.3)
GLUCOSE BLDC GLUCOMTR-MCNC: 124 MG/DL — HIGH (ref 70–99)
GLUCOSE BLDC GLUCOMTR-MCNC: 130 MG/DL — HIGH (ref 70–99)
GLUCOSE BLDC GLUCOMTR-MCNC: 141 MG/DL — HIGH (ref 70–99)
GLUCOSE BLDC GLUCOMTR-MCNC: 155 MG/DL — HIGH (ref 70–99)
GLUCOSE SERPL-MCNC: 138 MG/DL — HIGH (ref 70–99)
HCT VFR BLD CALC: 38.9 % — SIGNIFICANT CHANGE UP (ref 34.5–45)
HGB BLD-MCNC: 13.1 G/DL — SIGNIFICANT CHANGE UP (ref 11.5–15.5)
MCHC RBC-ENTMCNC: 28.8 PG — SIGNIFICANT CHANGE UP (ref 27–34)
MCHC RBC-ENTMCNC: 33.7 GM/DL — SIGNIFICANT CHANGE UP (ref 32–36)
MCV RBC AUTO: 85.5 FL — SIGNIFICANT CHANGE UP (ref 80–100)
NRBC # BLD: 0 /100 WBCS — SIGNIFICANT CHANGE UP (ref 0–0)
PLATELET # BLD AUTO: 273 K/UL — SIGNIFICANT CHANGE UP (ref 150–400)
POTASSIUM SERPL-MCNC: 3.6 MMOL/L — SIGNIFICANT CHANGE UP (ref 3.5–5.3)
POTASSIUM SERPL-SCNC: 3.6 MMOL/L — SIGNIFICANT CHANGE UP (ref 3.5–5.3)
PROT SERPL-MCNC: 6.9 G/DL — SIGNIFICANT CHANGE UP (ref 6–8.3)
RBC # BLD: 4.55 M/UL — SIGNIFICANT CHANGE UP (ref 3.8–5.2)
RBC # FLD: 13.8 % — SIGNIFICANT CHANGE UP (ref 10.3–14.5)
SODIUM SERPL-SCNC: 140 MMOL/L — SIGNIFICANT CHANGE UP (ref 135–145)
WBC # BLD: 6.98 K/UL — SIGNIFICANT CHANGE UP (ref 3.8–10.5)
WBC # FLD AUTO: 6.98 K/UL — SIGNIFICANT CHANGE UP (ref 3.8–10.5)

## 2021-08-23 PROCEDURE — 90792 PSYCH DIAG EVAL W/MED SRVCS: CPT

## 2021-08-23 PROCEDURE — 99232 SBSQ HOSP IP/OBS MODERATE 35: CPT | Mod: GC

## 2021-08-23 PROCEDURE — 99232 SBSQ HOSP IP/OBS MODERATE 35: CPT

## 2021-08-23 RX ADMIN — SENNA PLUS 2 TABLET(S): 8.6 TABLET ORAL at 21:22

## 2021-08-23 RX ADMIN — Medication 650 MILLIGRAM(S): at 21:22

## 2021-08-23 RX ADMIN — PANTOPRAZOLE SODIUM 40 MILLIGRAM(S): 20 TABLET, DELAYED RELEASE ORAL at 05:37

## 2021-08-23 RX ADMIN — Medication 81 MILLIGRAM(S): at 11:30

## 2021-08-23 RX ADMIN — Medication 1 DROP(S): at 21:22

## 2021-08-23 RX ADMIN — Medication 650 MILLIGRAM(S): at 21:47

## 2021-08-23 RX ADMIN — ENOXAPARIN SODIUM 40 MILLIGRAM(S): 100 INJECTION SUBCUTANEOUS at 11:30

## 2021-08-23 RX ADMIN — Medication 1 DROP(S): at 05:37

## 2021-08-23 RX ADMIN — ATORVASTATIN CALCIUM 80 MILLIGRAM(S): 80 TABLET, FILM COATED ORAL at 21:22

## 2021-08-23 RX ADMIN — POLYETHYLENE GLYCOL 3350 17 GRAM(S): 17 POWDER, FOR SOLUTION ORAL at 19:15

## 2021-08-23 RX ADMIN — PREGABALIN 1000 MICROGRAM(S): 225 CAPSULE ORAL at 11:30

## 2021-08-23 NOTE — BH CONSULTATION LIAISON ASSESSMENT NOTE - SUMMARY
CAROL MC is a 50y with a history of T2DM (not on prescribe medications), HLD, B12 deficiency with neuropathy, migraines  who presented to [facility] on 8/14 with complaint of acute weakness, tingling of upper and lower extremities, right > left. Stroke w/u negative. PM&R consulted, with thought that symptoms may be related to conversion disorder and felt patient would benefit from acute IPR. Now admitted to [facility] after for initiation of a multidisciplinary rehab program consisting focused on functional mobility, transfers and ADLs (activities of daily living). CAROL MC is a 50y with a history of T2DM (not on prescribe medications), HLD, B12 deficiency with neuropathy, migraines  who presented to [facility] on 8/14 with complaint of acute weakness, tingling of upper and lower extremities, right > left. Stroke w/u negative. PM&R consulted, with thought that symptoms may be related to conversion disorder and felt patient would benefit from acute IPR. Now admitted to [facility] after for initiation of a multidisciplinary rehab program consisting focused on functional mobility, transfers and ADLs (activities of daily living).    Psychiatry was consulted to evaluate pt who presents with conversion disorder. Pt had experienced headache, paralysis with associated numbness and aphasia. Neuro workup was unremarkable. Her neuro symptoms began suddenly after receiving bad news from her job. Pts deficits have improved and she no longer has neurological complaints. Denied depressed mood. Also endorsed she would not be interested in an antidepressant should her mood become depressed.

## 2021-08-23 NOTE — BH CONSULTATION LIAISON ASSESSMENT NOTE - NSBHCHARTREVIEWINVESTIGATE_PSY_A_CORE FT
< from: 12 Lead ECG (08.14.21 @ 22:26) >      Ventricular Rate 76 BPM    Atrial Rate 76 BPM    P-R Interval 132 ms    QRS Duration 74 ms    Q-T Interval 396 ms    QTC Calculation(Bazett) 445 ms    P Axis 29 degrees    R Axis 34 degrees    T Axis 24 degrees    Diagnosis Line Normal sinus rhythm  Anterior infarct , age undetermined  Abnormal ECG    < end of copied text >

## 2021-08-23 NOTE — BH CONSULTATION LIAISON ASSESSMENT NOTE - NSBHCHARTREVIEWLAB_PSY_A_CORE FT
13.1   6.98  )-----------( 273      ( 23 Aug 2021 05:59 )             38.9   08-23    140  |  105  |  18  ----------------------------<  138<H>  3.6   |  25  |  0.80    Ca    9.3      23 Aug 2021 05:59    TPro  6.9  /  Alb  3.7  /  TBili  0.5  /  DBili  x   /  AST  22  /  ALT  40  /  AlkPhos  89  08-23

## 2021-08-23 NOTE — BH CONSULTATION LIAISON ASSESSMENT NOTE - NSSUICPROTFACT_PSY_ALL_CORE
Responsibility to children, family, or others/Identifies reasons for living/Supportive social network of family or friends/Fear of death or the actual act of killing self/Cultural, spiritual and/or moral attitudes against suicide/Latter-day beliefs

## 2021-08-23 NOTE — BH CONSULTATION LIAISON ASSESSMENT NOTE - DESCRIPTION
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

## 2021-08-23 NOTE — BH CONSULTATION LIAISON ASSESSMENT NOTE - HPI (INCLUDE ILLNESS QUALITY, SEVERITY, DURATION, TIMING, CONTEXT, MODIFYING FACTORS, ASSOCIATED SIGNS AND SYMPTOMS)
This is a 49 y/o female with PMH of T2DM not on prescribe medications, HLD, B12 deficiency with neuropathy, migraines presented to ED at [facility] on 8/14 with acute weakness, tingling of upper and lower extremities, right > left. In ED, BP was 160/118 and CT brain was negative for acute pathology. Patient was admitted to telemetry. MRI head w/o contrast: no acute findings. TTE results: normal. ASA and Lipitor started.   On 8/16, ENT consulted for burning and tightness while eating. Scoped patient and did not find an acute pathology to cause these symptoms.   PM&R had been following patient. Stroke work up was negative and physiatrist felt symptoms may be related to conversion syndrome. PT and OT saw patient. Patient deemed a candidate for acute IPR at [facility] and transferred on 8/19.    Psychiatry C/L note: Pending This is a 51 y/o female with PMH of T2DM not on prescribe medications, HLD, B12 deficiency with neuropathy, migraines presented to ED at [facility] on 8/14 with acute weakness, tingling of upper and lower extremities, right > left. In ED, BP was 160/118 and CT brain was negative for acute pathology. Patient was admitted to telemetry. MRI head w/o contrast: no acute findings. TTE results: normal. ASA and Lipitor started.   On 8/16, ENT consulted for burning and tightness while eating. Scoped patient and did not find an acute pathology to cause these symptoms.   PM&R had been following patient. Stroke work up was negative and physiatrist felt symptoms may be related to conversion syndrome. PT and OT saw patient. Patient deemed a candidate for acute IPR at [facility] and transferred on 8/19.    Psychiatry C/L note: Pt seen and evaluated at bedside. Pt is calm and cooperative with the undersign. Reports that she received "bad news" from her job. 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   She states her job attempted to give her a promotion that she was not interested in. This led to her wanting to resign from the job. She states she received a letter from the job with an offer that "stressed her out". Pt states after reading the letter she became worried. Reports she began to experiencing a right sided headache. Later on that day she began to have right arm paralysis, difficulty speaking and numbness in both of her feet. She states she used the bathroom and felt like she was "floating".   Pt had been taken to the ED and an extensive neuro workup was done which was unremarkable.   As the days progressed her motor skills returned and she began speaking again.  She states her family was engaged in her initial rehab treatment which helped her regain her motor skills and ability to speak. She discussed her passion for teaching. Discussed that she does NOT regret her decision to resign.   She feels her mood is stable and she is motivated to complete her rehab course. Reports that she intermittently feels sad because her daughter is not able to visit her as much. She feels her mood would be happier when she returns home.   Denied issues related to sleep, appetite, concentration and energy.   Denied feeling suicidal/homicidal and or having perceptual disturbances. Rest of psychiatric ROS otherwise unremarkable or noted in the below mental status exam.

## 2021-08-23 NOTE — BH CONSULTATION LIAISON ASSESSMENT NOTE - CURRENT MEDICATION
MEDICATIONS  (STANDING):  artificial  tears Solution 1 Drop(s) Both EYES two times a day  aspirin enteric coated 81 milliGRAM(s) Oral daily  atorvastatin 80 milliGRAM(s) Oral at bedtime  cyanocobalamin 1000 MICROGram(s) Oral daily  dextrose 40% Gel 15 Gram(s) Oral once  dextrose 5%. 1000 milliLiter(s) (50 mL/Hr) IV Continuous <Continuous>  dextrose 5%. 1000 milliLiter(s) (100 mL/Hr) IV Continuous <Continuous>  dextrose 50% Injectable 25 Gram(s) IV Push once  dextrose 50% Injectable 12.5 Gram(s) IV Push once  dextrose 50% Injectable 25 Gram(s) IV Push once  enoxaparin Injectable 40 milliGRAM(s) SubCutaneous daily  glucagon  Injectable 1 milliGRAM(s) IntraMuscular once  insulin lispro (ADMELOG) corrective regimen sliding scale   SubCutaneous three times a day before meals  insulin lispro (ADMELOG) corrective regimen sliding scale   SubCutaneous at bedtime  pantoprazole    Tablet 40 milliGRAM(s) Oral before breakfast  polyethylene glycol 3350 17 Gram(s) Oral two times a day  senna 2 Tablet(s) Oral at bedtime    MEDICATIONS  (PRN):  acetaminophen   Tablet .. 650 milliGRAM(s) Oral every 6 hours PRN Mild Pain (1 - 3)  benzocaine 15 mG/menthol 3.6 mG (Sugar-Free) Lozenge 1 Lozenge Oral every 6 hours PRN Sore Throat  bisacodyl 10 milliGRAM(s) Oral daily PRN Constipation

## 2021-08-23 NOTE — BH CONSULTATION LIAISON ASSESSMENT NOTE - NSBHCHARTREVIEWVS_PSY_A_CORE FT
Vital Signs Last 24 Hrs  T(C): 36.4 (22 Aug 2021 22:55), Max: 36.4 (22 Aug 2021 22:55)  T(F): 97.5 (22 Aug 2021 22:55), Max: 97.5 (22 Aug 2021 22:55)  HR: 75 (22 Aug 2021 22:55) (75 - 75)  BP: 107/69 (22 Aug 2021 22:55) (107/69 - 107/69)  BP(mean): --  RR: 16 (22 Aug 2021 22:55) (16 - 16)  SpO2: 99% (22 Aug 2021 22:55) (99% - 99%)

## 2021-08-23 NOTE — BH CONSULTATION LIAISON ASSESSMENT NOTE - RISK ASSESSMENT
Risk factors: Mood disorder, recent change in employment.   Protective factors: no active suicidal/homicidal ideations, no h/o SA/SIB, no h/o psych admissions, denies access to weapons, no active substance abuse, dependent children, domiciled with family, has social supports, positive therapeutic relationships     Overall, pt is a low risk for all dangerous behaviors.

## 2021-08-23 NOTE — CHART NOTE - NSCHARTNOTEFT_GEN_A_CORE
IDT meeting 8/23    ST: mild cog deficits with memory, short term and working memory. decreased processing time     OT: sup, set up for eating and grooming   CG toilet and shower transfer   min A dressing and bathing -last week eval         PT: CS, hand held assist   150ft ambulating    goals: independent     Tentative dc home 8/27 with family

## 2021-08-24 LAB
GLUCOSE BLDC GLUCOMTR-MCNC: 126 MG/DL — HIGH (ref 70–99)
GLUCOSE BLDC GLUCOMTR-MCNC: 138 MG/DL — HIGH (ref 70–99)
GLUCOSE BLDC GLUCOMTR-MCNC: 142 MG/DL — HIGH (ref 70–99)
GLUCOSE BLDC GLUCOMTR-MCNC: 213 MG/DL — HIGH (ref 70–99)

## 2021-08-24 PROCEDURE — 99232 SBSQ HOSP IP/OBS MODERATE 35: CPT

## 2021-08-24 PROCEDURE — 99232 SBSQ HOSP IP/OBS MODERATE 35: CPT | Mod: GC

## 2021-08-24 RX ADMIN — Medication 4: at 16:32

## 2021-08-24 RX ADMIN — ATORVASTATIN CALCIUM 80 MILLIGRAM(S): 80 TABLET, FILM COATED ORAL at 21:13

## 2021-08-24 RX ADMIN — Medication 1 DROP(S): at 17:47

## 2021-08-24 RX ADMIN — Medication 1 DROP(S): at 06:12

## 2021-08-24 RX ADMIN — ENOXAPARIN SODIUM 40 MILLIGRAM(S): 100 INJECTION SUBCUTANEOUS at 11:36

## 2021-08-24 RX ADMIN — Medication 81 MILLIGRAM(S): at 11:35

## 2021-08-24 RX ADMIN — PREGABALIN 1000 MICROGRAM(S): 225 CAPSULE ORAL at 11:35

## 2021-08-24 RX ADMIN — PANTOPRAZOLE SODIUM 40 MILLIGRAM(S): 20 TABLET, DELAYED RELEASE ORAL at 06:13

## 2021-08-24 NOTE — PROGRESS NOTE ADULT - ATTENDING COMMENTS
Rehab Attending- Patient seen and examined by me with NP Darian- Case discussed, above note reviewed by me with modifications made    doing well  strength increasing  to continue intensive rehab program

## 2021-08-25 ENCOUNTER — TRANSCRIPTION ENCOUNTER (OUTPATIENT)
Age: 51
End: 2021-08-25

## 2021-08-25 LAB
GLUCOSE BLDC GLUCOMTR-MCNC: 126 MG/DL — HIGH (ref 70–99)
GLUCOSE BLDC GLUCOMTR-MCNC: 130 MG/DL — HIGH (ref 70–99)
GLUCOSE BLDC GLUCOMTR-MCNC: 132 MG/DL — HIGH (ref 70–99)
GLUCOSE BLDC GLUCOMTR-MCNC: 147 MG/DL — HIGH (ref 70–99)

## 2021-08-25 PROCEDURE — 99232 SBSQ HOSP IP/OBS MODERATE 35: CPT

## 2021-08-25 PROCEDURE — 99232 SBSQ HOSP IP/OBS MODERATE 35: CPT | Mod: GC

## 2021-08-25 RX ORDER — ACETAMINOPHEN 500 MG
975 TABLET ORAL EVERY 6 HOURS
Refills: 0 | Status: DISCONTINUED | OUTPATIENT
Start: 2021-08-25 | End: 2021-08-26

## 2021-08-25 RX ORDER — ACETAMINOPHEN 500 MG
3 TABLET ORAL
Qty: 0 | Refills: 0 | DISCHARGE
Start: 2021-08-25

## 2021-08-25 RX ORDER — ATORVASTATIN CALCIUM 80 MG/1
1 TABLET, FILM COATED ORAL
Qty: 30 | Refills: 0
Start: 2021-08-25 | End: 2021-09-23

## 2021-08-25 RX ORDER — PREGABALIN 225 MG/1
1 CAPSULE ORAL
Qty: 0 | Refills: 0 | DISCHARGE
Start: 2021-08-25

## 2021-08-25 RX ORDER — ASPIRIN/CALCIUM CARB/MAGNESIUM 324 MG
1 TABLET ORAL
Qty: 0 | Refills: 0 | DISCHARGE
Start: 2021-08-25

## 2021-08-25 RX ADMIN — Medication 81 MILLIGRAM(S): at 11:35

## 2021-08-25 RX ADMIN — Medication 1 DROP(S): at 06:31

## 2021-08-25 RX ADMIN — ENOXAPARIN SODIUM 40 MILLIGRAM(S): 100 INJECTION SUBCUTANEOUS at 11:35

## 2021-08-25 RX ADMIN — PANTOPRAZOLE SODIUM 40 MILLIGRAM(S): 20 TABLET, DELAYED RELEASE ORAL at 06:31

## 2021-08-25 RX ADMIN — Medication 650 MILLIGRAM(S): at 09:23

## 2021-08-25 RX ADMIN — Medication 1 DROP(S): at 20:57

## 2021-08-25 RX ADMIN — ATORVASTATIN CALCIUM 80 MILLIGRAM(S): 80 TABLET, FILM COATED ORAL at 21:00

## 2021-08-25 RX ADMIN — PREGABALIN 1000 MICROGRAM(S): 225 CAPSULE ORAL at 11:35

## 2021-08-25 RX ADMIN — POLYETHYLENE GLYCOL 3350 17 GRAM(S): 17 POWDER, FOR SOLUTION ORAL at 20:09

## 2021-08-25 RX ADMIN — Medication 650 MILLIGRAM(S): at 10:23

## 2021-08-25 RX ADMIN — SENNA PLUS 2 TABLET(S): 8.6 TABLET ORAL at 21:00

## 2021-08-25 NOTE — DISCHARGE NOTE PROVIDER - NSDCCPCAREPLAN_GEN_ALL_CORE_FT
PRINCIPAL DISCHARGE DIAGNOSIS  Diagnosis: Conversion disorder  Assessment and Plan of Treatment: Your MRI did not show that you had a stroke.   Call the outpatient Neurology office at [phone] for further evaluation. Your MRI Cervical spine did show multiple areas of degernerative changes with herniated discs or formaen narrowing. Please follow up with your neurologist for possible referral to a neurosurgeon.  You were placed on a statin (lipitor) by neurology. During your follow up appointment, discuss need to stay on a high dose or if the dose can be lowered.   Follow up with your PCP within 1 week. Ask your PCP for a referral to a couselor who may be able to help you avoid future episodes of high blood pressure, palpitations, and weakness. Couselors that provide services to help their patients talk through stressors are very helpful to the patient.      SECONDARY DISCHARGE DIAGNOSES  Diagnosis: Type 2 diabetes mellitus  Assessment and Plan of Treatment: Continue a consistent carbohydrate/low carbohydrate diet.   You did not want to restart metformin for a HA1c of 7.2% (8/14).   Follow up with your PCP to discuss options for blood sugar control.   Your fasting blood sugars ranaged from 120-130.     PRINCIPAL DISCHARGE DIAGNOSIS  Diagnosis: Conversion disorder  Assessment and Plan of Treatment: Your MRI did not show that you had a stroke.   Call the outpatient Neurology office at [phone] for further evaluation. Your MRI Cervical spine did show multiple areas of degernerative changes with herniated discs or formaen narrowing. Please follow up with your neurologist for possible referral to a neurosurgeon.  You were placed on a statin (lipitor) by neurology. During your follow up appointment, discuss need to stay on a high dose or if the dose can be lowered.   Follow up with your PCP within 1 week. Ask your PCP for a referral to a couselor who may be able to help you avoid future episodes of high blood pressure, palpitations, and weakness. Couselors that provide services to help their patients talk through stressors are very helpful to the patient.      SECONDARY DISCHARGE DIAGNOSES  Diagnosis: Type 2 diabetes mellitus  Assessment and Plan of Treatment: Continue a consistent carbohydrate/low carbohydrate diet.   You did not want to restart metformin for a HA1c of 7.2% (8/14).   Follow up with your PCP to discuss options for blood sugar control.   Your fasting blood sugars ranaged from 120-130.    Diagnosis: Vitamin B12 deficiency  Assessment and Plan of Treatment: Continue over the counter vitamin b12 (cyanocobalamin). Follow up with your PCP.

## 2021-08-25 NOTE — DISCHARGE NOTE PROVIDER - CARE PROVIDER_API CALL
Cam Hammer)  [facility]  [address] [place] [address]  Phone: [phone]  Fax: [phone]  Follow Up Time:

## 2021-08-25 NOTE — DISCHARGE NOTE PROVIDER - NSDCFUADDAPPT_GEN_ALL_CORE_FT
Call to make outpatient appointment with Neurology at [phone] for further evaluation.    Follow up with your PCP within 1 week.     If you would like to follow up with Dr Hammer. Call the office to make an appointment for 4 to 6 weeks out from now. [phone].    If you have any questions or concerns, please feel free to contact Sue Farmer NP at [phone] or [phone].

## 2021-08-25 NOTE — DISCHARGE NOTE PROVIDER - NS AS DC PROVIDER CONTACT Y/N MULTI
Nutrition Care Plan    Nutrition Diagnosis:   Inadequate intake related to decreased appetite as evidenced by weight loss of 10.1 kg (11%) in 8 months. Intervention:  General/healthful diet:   Continue Regular Diet  Commercial beverage:    Will trial vanilla standard oral supplement once daily (350 calories 20 grams of protein)      Monitoring and Evaluation:  Amount of food:   Goal: Patient will consistently consume >75% of meals Yes

## 2021-08-25 NOTE — CHART NOTE - NSCHARTNOTEFT_GEN_A_CORE
Nutrition Follow Up Note  Hospital Course   (Per Electronic Medical Record)    Source:  Patient [X]  Medical Record [X]      Diet:   Consistent Carbohydrate Diet w/ Thin Liquids  Diet Consistency Upgraded on 8/24  Consumes 75-80% of Meals (as Per Documentation)   Good PO Intake/Appetite   Obtained Food Preferences from Patient    Enteral/Parenteral Nutrition: Not Applicable    Current Weight: 179.6lb on 8/12  Obtain New Weight  Obtain Weights Weekly     Pertinent Medications: MEDICATIONS  (STANDING):  artificial  tears Solution 1 Drop(s) Both EYES two times a day  aspirin enteric coated 81 milliGRAM(s) Oral daily  atorvastatin 80 milliGRAM(s) Oral at bedtime  cyanocobalamin 1000 MICROGram(s) Oral daily  dextrose 40% Gel 15 Gram(s) Oral once  dextrose 5%. 1000 milliLiter(s) (50 mL/Hr) IV Continuous <Continuous>  dextrose 5%. 1000 milliLiter(s) (100 mL/Hr) IV Continuous <Continuous>  dextrose 50% Injectable 25 Gram(s) IV Push once  dextrose 50% Injectable 12.5 Gram(s) IV Push once  dextrose 50% Injectable 25 Gram(s) IV Push once  enoxaparin Injectable 40 milliGRAM(s) SubCutaneous daily  glucagon  Injectable 1 milliGRAM(s) IntraMuscular once  insulin lispro (ADMELOG) corrective regimen sliding scale   SubCutaneous three times a day before meals  insulin lispro (ADMELOG) corrective regimen sliding scale   SubCutaneous at bedtime  pantoprazole    Tablet 40 milliGRAM(s) Oral before breakfast  polyethylene glycol 3350 17 Gram(s) Oral two times a day  senna 2 Tablet(s) Oral at bedtime    MEDICATIONS  (PRN):  acetaminophen   Tablet .. 650 milliGRAM(s) Oral every 6 hours PRN Mild Pain (1 - 3)  benzocaine 15 mG/menthol 3.6 mG (Sugar-Free) Lozenge 1 Lozenge Oral every 6 hours PRN Sore Throat  bisacodyl 10 milliGRAM(s) Oral daily PRN Constipation    Pertinent Labs:  08-23 Na140 mmol/L Glu 138 mg/dL<H> K+ 3.6 mmol/L Cr  0.80 mg/dL BUN 18 mg/dL 08-23 Alb 3.7 g/dL 08-15 Chol 170 mg/dL LDL --    HDL 57 mg/dL Trig 139 mg/dL    Skin: No Pressure Ulcers     Edema: None Noted (as Per Documentation)     Last Bowel Movement: on 8/24    Estimated Needs:   [X] No Change Since Previous Assessment    Previous Nutrition Diagnosis:   Moderate Malnutrition     Nutrition Diagnosis is [X] Ongoing - Declined Nutrition Supplementation     New Nutrition Diagnosis: [X] Not Applicable    Interventions:   1. Recommend Continue Nutrition Plan of Care     Monitoring & Evaluation:   [X] Weights   [X] PO Intake   [X] Skin Integrity   [X] Follow Up (Per Protocol)  [X] Tolerance to Diet Prescription   [X] Other: Labs & POCT    Registered Dietitian/Nutritionist Remains Available.  Edi Lynne RDN    Pager #[phone]  Phone# [phone]

## 2021-08-25 NOTE — DISCHARGE NOTE PROVIDER - HOSPITAL COURSE
This is a 51 y/o female with PMH of T2DM not on prescribe medications, HLD, B12 deficiency with neuropathy, migraines presented to ED at [facility] on 8/14 with acute weakness, tingling of upper and lower extremities, right > left. In ED, BP was 160/118 and CT brain was negative for acute pathology. Patient was admitted to telemetry. MRI head w/o contrast: no acute findings. TTE results: normal. ASA and lipitor started.   On 8/16, ENT consulted for burning and tightness while eating. Scoped patient and did not find an acute pathology to cause these symptoms.   PM&R had been following patient. Stroke work up was negative and physiatrist felt symptoms may be related to conversion syndrome. PT and OT saw patient. Patient deemed a candidate for acute IPR at [facility] and transferred on 8/19.    During rehab stay, patient made great functional gains. All weakness has resolved.   Patient was seen by psychiatry. Patient denied depression and stated that if she was to become more depressed she would not take an antidepressant.   Patient refused metformin. Stated that she had taken it in the past but didn't want to continue taking it. She is instructed to follow up with her PCP after dc from rehab.     Patient is medically stable for dc to home with family. This is a 51 y/o female with PMH of T2DM not on prescribe medications, HLD, B12 deficiency with neuropathy, migraines presented to ED at [facility] on 8/14 with acute weakness, tingling of upper and lower extremities, right > left. In ED, BP was 160/118 and CT brain was negative for acute pathology. Patient was admitted to telemetry. MRI head w/o contrast: no acute findings. TTE results: normal. ASA and lipitor started.   On 8/16, ENT consulted for burning and tightness while eating. Scoped patient and did not find an acute pathology to cause these symptoms.   PM&R had been following patient. Stroke work up was negative and physiatrist felt symptoms may be related to conversion syndrome. PT and OT saw patient. Patient deemed a candidate for acute IPR at [facility] and transferred on 8/19.    During rehab stay, patient made great functional gains. All weakness has resolved.   Patient was seen by psychiatry. Patient denied depression and stated that if she was to become more depressed she would not take an antidepressant.   Patient refused metformin. Stated that she had taken it in the past but didn't want to continue taking it. She is instructed to follow up with her PCP after dc from rehab.     Patient is medically stable for dc to home with family and outpatient therapies.

## 2021-08-25 NOTE — DISCHARGE NOTE PROVIDER - DETAILS OF MALNUTRITION DIAGNOSIS/DIAGNOSES
This patient has been assessed with a concern for Malnutrition and was treated during this hospitalization for the following Nutrition diagnosis/diagnoses:     -  08/20/2021: Moderate protein-calorie malnutrition

## 2021-08-25 NOTE — DISCHARGE NOTE PROVIDER - NSDCHHCONTACT_GEN_ALL_CORE_FT
(4) completely dependent As certified below, I, or a nurse practitioner or physician assistant working with me, had a face-to-face encounter that meets the physician face-to-face encounter requirements.

## 2021-08-25 NOTE — DISCHARGE NOTE PROVIDER - NSDCMRMEDTOKEN_GEN_ALL_CORE_FT
acetaminophen 325 mg oral tablet: 3 tab(s) orally every 6 hours, As needed, Mild Pain (1 - 3)  aspirin 81 mg oral delayed release tablet: 1 tab(s) orally once a day  cyanocobalamin 1000 mcg oral tablet: 1 tab(s) orally once a day   acetaminophen 325 mg oral tablet: 3 tab(s) orally every 6 hours, As needed, Mild Pain (1 - 3)  aspirin 81 mg oral delayed release tablet: 1 tab(s) orally once a day  atorvastatin 80 mg oral tablet: 1 tab(s) orally once a day (at bedtime)  cyanocobalamin 1000 mcg oral tablet: 1 tab(s) orally once a day   acetaminophen 325 mg oral tablet: 3 tab(s) orally every 6 hours, As needed, Mild Pain (1 - 3)  aspirin 81 mg oral delayed release tablet: 1 tab(s) orally once a day  atorvastatin 80 mg oral tablet: 1 tab(s) orally once a day (at bedtime)  cyanocobalamin 1000 mcg oral tablet: 1 tab(s) orally once a day  pantoprazole 40 mg oral delayed release tablet: 1 tab(s) orally once a day (before a meal)

## 2021-08-26 ENCOUNTER — TRANSCRIPTION ENCOUNTER (OUTPATIENT)
Age: 51
End: 2021-08-26

## 2021-08-26 VITALS
HEART RATE: 71 BPM | RESPIRATION RATE: 15 BRPM | OXYGEN SATURATION: 98 % | TEMPERATURE: 98 F | DIASTOLIC BLOOD PRESSURE: 75 MMHG | SYSTOLIC BLOOD PRESSURE: 120 MMHG

## 2021-08-26 LAB
ALBUMIN SERPL ELPH-MCNC: 4.2 G/DL — SIGNIFICANT CHANGE UP (ref 3.3–5)
ALP SERPL-CCNC: 102 U/L — SIGNIFICANT CHANGE UP (ref 40–120)
ALT FLD-CCNC: 41 U/L — SIGNIFICANT CHANGE UP (ref 10–45)
ANION GAP SERPL CALC-SCNC: 12 MMOL/L — SIGNIFICANT CHANGE UP (ref 5–17)
AST SERPL-CCNC: 25 U/L — SIGNIFICANT CHANGE UP (ref 10–40)
BILIRUB SERPL-MCNC: 0.6 MG/DL — SIGNIFICANT CHANGE UP (ref 0.2–1.2)
BUN SERPL-MCNC: 14 MG/DL — SIGNIFICANT CHANGE UP (ref 7–23)
CALCIUM SERPL-MCNC: 9.8 MG/DL — SIGNIFICANT CHANGE UP (ref 8.4–10.5)
CHLORIDE SERPL-SCNC: 107 MMOL/L — SIGNIFICANT CHANGE UP (ref 96–108)
CO2 SERPL-SCNC: 25 MMOL/L — SIGNIFICANT CHANGE UP (ref 22–31)
CREAT SERPL-MCNC: 0.78 MG/DL — SIGNIFICANT CHANGE UP (ref 0.5–1.3)
GLUCOSE BLDC GLUCOMTR-MCNC: 140 MG/DL — HIGH (ref 70–99)
GLUCOSE BLDC GLUCOMTR-MCNC: 148 MG/DL — HIGH (ref 70–99)
GLUCOSE SERPL-MCNC: 128 MG/DL — HIGH (ref 70–99)
HCT VFR BLD CALC: 42.7 % — SIGNIFICANT CHANGE UP (ref 34.5–45)
HGB BLD-MCNC: 14.6 G/DL — SIGNIFICANT CHANGE UP (ref 11.5–15.5)
MCHC RBC-ENTMCNC: 29 PG — SIGNIFICANT CHANGE UP (ref 27–34)
MCHC RBC-ENTMCNC: 34.2 GM/DL — SIGNIFICANT CHANGE UP (ref 32–36)
MCV RBC AUTO: 84.7 FL — SIGNIFICANT CHANGE UP (ref 80–100)
NRBC # BLD: 0 /100 WBCS — SIGNIFICANT CHANGE UP (ref 0–0)
PLATELET # BLD AUTO: 263 K/UL — SIGNIFICANT CHANGE UP (ref 150–400)
POTASSIUM SERPL-MCNC: 4.1 MMOL/L — SIGNIFICANT CHANGE UP (ref 3.5–5.3)
POTASSIUM SERPL-SCNC: 4.1 MMOL/L — SIGNIFICANT CHANGE UP (ref 3.5–5.3)
PROT SERPL-MCNC: 7.6 G/DL — SIGNIFICANT CHANGE UP (ref 6–8.3)
RBC # BLD: 5.04 M/UL — SIGNIFICANT CHANGE UP (ref 3.8–5.2)
RBC # FLD: 13.8 % — SIGNIFICANT CHANGE UP (ref 10.3–14.5)
SARS-COV-2 RNA SPEC QL NAA+PROBE: SIGNIFICANT CHANGE UP
SODIUM SERPL-SCNC: 144 MMOL/L — SIGNIFICANT CHANGE UP (ref 135–145)
WBC # BLD: 7.66 K/UL — SIGNIFICANT CHANGE UP (ref 3.8–10.5)
WBC # FLD AUTO: 7.66 K/UL — SIGNIFICANT CHANGE UP (ref 3.8–10.5)

## 2021-08-26 PROCEDURE — 80053 COMPREHEN METABOLIC PANEL: CPT

## 2021-08-26 PROCEDURE — 97163 PT EVAL HIGH COMPLEX 45 MIN: CPT

## 2021-08-26 PROCEDURE — 85025 COMPLETE CBC W/AUTO DIFF WBC: CPT

## 2021-08-26 PROCEDURE — U0003: CPT

## 2021-08-26 PROCEDURE — 92507 TX SP LANG VOICE COMM INDIV: CPT

## 2021-08-26 PROCEDURE — 82962 GLUCOSE BLOOD TEST: CPT

## 2021-08-26 PROCEDURE — 99239 HOSP IP/OBS DSCHRG MGMT >30: CPT

## 2021-08-26 PROCEDURE — 92610 EVALUATE SWALLOWING FUNCTION: CPT

## 2021-08-26 PROCEDURE — U0005: CPT

## 2021-08-26 PROCEDURE — 97535 SELF CARE MNGMENT TRAINING: CPT

## 2021-08-26 PROCEDURE — 97530 THERAPEUTIC ACTIVITIES: CPT

## 2021-08-26 PROCEDURE — 85027 COMPLETE CBC AUTOMATED: CPT

## 2021-08-26 PROCEDURE — 97116 GAIT TRAINING THERAPY: CPT

## 2021-08-26 PROCEDURE — 36415 COLL VENOUS BLD VENIPUNCTURE: CPT

## 2021-08-26 PROCEDURE — 97112 NEUROMUSCULAR REEDUCATION: CPT

## 2021-08-26 PROCEDURE — 97110 THERAPEUTIC EXERCISES: CPT

## 2021-08-26 PROCEDURE — 97167 OT EVAL HIGH COMPLEX 60 MIN: CPT

## 2021-08-26 PROCEDURE — 99232 SBSQ HOSP IP/OBS MODERATE 35: CPT

## 2021-08-26 PROCEDURE — 92523 SPEECH SOUND LANG COMPREHEN: CPT

## 2021-08-26 PROCEDURE — 87635 SARS-COV-2 COVID-19 AMP PRB: CPT

## 2021-08-26 RX ORDER — PANTOPRAZOLE SODIUM 20 MG/1
1 TABLET, DELAYED RELEASE ORAL
Qty: 30 | Refills: 0
Start: 2021-08-26 | End: 2021-09-24

## 2021-08-26 RX ADMIN — PANTOPRAZOLE SODIUM 40 MILLIGRAM(S): 20 TABLET, DELAYED RELEASE ORAL at 08:26

## 2021-08-26 RX ADMIN — PREGABALIN 1000 MICROGRAM(S): 225 CAPSULE ORAL at 12:16

## 2021-08-26 RX ADMIN — Medication 81 MILLIGRAM(S): at 12:16

## 2021-08-26 RX ADMIN — ENOXAPARIN SODIUM 40 MILLIGRAM(S): 100 INJECTION SUBCUTANEOUS at 12:16

## 2021-08-26 RX ADMIN — Medication 1 DROP(S): at 08:26

## 2021-08-26 RX ADMIN — Medication 975 MILLIGRAM(S): at 16:28

## 2021-08-26 NOTE — PROGRESS NOTE ADULT - SUBJECTIVE AND OBJECTIVE BOX
Patient is a 50y old  Female who presents with a chief complaint of conversion disorder (21 Aug 2021 14:07)      Patient seen and examined at bedside.  No overnight events  wants to go home    ALLERGIES:  penicillin (Unknown)    MEDICATIONS  (STANDING):  artificial  tears Solution 1 Drop(s) Both EYES two times a day  aspirin enteric coated 81 milliGRAM(s) Oral daily  atorvastatin 80 milliGRAM(s) Oral at bedtime  cyanocobalamin 1000 MICROGram(s) Oral daily  dextrose 40% Gel 15 Gram(s) Oral once  dextrose 5%. 1000 milliLiter(s) (50 mL/Hr) IV Continuous <Continuous>  dextrose 5%. 1000 milliLiter(s) (100 mL/Hr) IV Continuous <Continuous>  dextrose 50% Injectable 25 Gram(s) IV Push once  dextrose 50% Injectable 12.5 Gram(s) IV Push once  dextrose 50% Injectable 25 Gram(s) IV Push once  enoxaparin Injectable 40 milliGRAM(s) SubCutaneous daily  glucagon  Injectable 1 milliGRAM(s) IntraMuscular once  insulin lispro (ADMELOG) corrective regimen sliding scale   SubCutaneous three times a day before meals  insulin lispro (ADMELOG) corrective regimen sliding scale   SubCutaneous at bedtime  pantoprazole    Tablet 40 milliGRAM(s) Oral before breakfast  polyethylene glycol 3350 17 Gram(s) Oral two times a day  senna 2 Tablet(s) Oral at bedtime    MEDICATIONS  (PRN):  acetaminophen   Tablet .. 650 milliGRAM(s) Oral every 6 hours PRN Mild Pain (1 - 3)  benzocaine 15 mG/menthol 3.6 mG (Sugar-Free) Lozenge 1 Lozenge Oral every 6 hours PRN Sore Throat  bisacodyl 10 milliGRAM(s) Oral daily PRN Constipation    Vital Signs Last 24 Hrs  T(F): 97.8 (21 Aug 2021 21:40), Max: 97.8 (21 Aug 2021 21:40)  HR: 76 (21 Aug 2021 21:40) (76 - 76)  BP: 102/67 (21 Aug 2021 21:40) (102/67 - 102/67)  RR: 14 (21 Aug 2021 21:40) (14 - 14)  SpO2: 98% (21 Aug 2021 21:40) (98% - 98%)  I&O's Summary    21 Aug 2021 07:01  -  22 Aug 2021 07:00  --------------------------------------------------------  IN: 120 mL / OUT: 0 mL / NET: 120 mL      BMI (kg/m2): 30.8 (08-19-21 @ 18:56)    PHYSICAL EXAM:  GENERAL: NAD  HENT:  Atraumatic, Normocephalic; No tonsillar erythema, exudates, or enlargement; Moist mucous membranes;   EYES: EOMI, PERRLA, conjunctiva and sclera clear, no lid-lag  NECK: Supple, No JVD, Normal thyroid  CHEST/LUNG: Clear to percussion bilaterally; No rales, rhonchi, wheezing, or rubs; normal respiratory effort, no intercostal retractions  HEART: Regular rate and rhythm; No murmurs, rubs, or gallops; No pitting edema  ABDOMEN: Soft, Nontender, Nondistended; Bowel sounds present; No HSM  MUSCULOSKELETAL/EXTREMITIES:  2+ Peripheral Pulses, No clubbing or digital cyanosis  PSYCH: Appropriate affect, Alert & Awake    LABS:                        14.3   6.38  )-----------( 299      ( 20 Aug 2021 05:45 )             43.0       08-20    142  |  106  |  10  ----------------------------<  131  3.5   |  26  |  0.79    Ca    9.5      20 Aug 2021 05:45    TPro  7.0  /  Alb  3.8  /  TBili  0.7  /  DBili  x   /  AST  16  /  ALT  36  /  AlkPhos  84  08-20     eGFR if Non African American: 88 mL/min/1.73M2 (08-20-21 @ 05:45)  eGFR if African American: 102 mL/min/1.73M2 (08-20-21 @ 05:45)       08-15 Chol 170 mg/dL LDL -- HDL 57 mg/dL Trig 139 mg/dL      POCT Blood Glucose.: 138 mg/dL (22 Aug 2021 08:12)  POCT Blood Glucose.: 146 mg/dL (21 Aug 2021 21:37)  POCT Blood Glucose.: 162 mg/dL (21 Aug 2021 17:10)  POCT Blood Glucose.: 166 mg/dL (21 Aug 2021 12:16)          Culture - Urine (collected 16 Aug 2021 21:27)  Source: Clean Catch Clean Catch (Midstream)  Final Report (17 Aug 2021 19:43):    <10,000 CFU/mL Normal Urogenital Chantal      COVID-19 PCR: NotDetec (08-20-21 @ 05:30)      Care Discussed with Rehab Attending and Other Providers  
Patient is a 50y old  Female who presents with a chief complaint of conversion disorder (23 Aug 2021 12:51)      Patient seen and examined at bedside. Patient with no acute events overnight, denies any acute complaints at this time. Sleeping in bed comfortably, but arousable with verbal stimuli. Patient denies chest pain, sob, palpitations, abd pain, numbness or tingling.    ALLERGIES:  penicillin (Unknown)    MEDICATIONS  (STANDING):  artificial  tears Solution 1 Drop(s) Both EYES two times a day  aspirin enteric coated 81 milliGRAM(s) Oral daily  atorvastatin 80 milliGRAM(s) Oral at bedtime  cyanocobalamin 1000 MICROGram(s) Oral daily  dextrose 40% Gel 15 Gram(s) Oral once  dextrose 5%. 1000 milliLiter(s) (50 mL/Hr) IV Continuous <Continuous>  dextrose 5%. 1000 milliLiter(s) (100 mL/Hr) IV Continuous <Continuous>  dextrose 50% Injectable 25 Gram(s) IV Push once  dextrose 50% Injectable 12.5 Gram(s) IV Push once  dextrose 50% Injectable 25 Gram(s) IV Push once  enoxaparin Injectable 40 milliGRAM(s) SubCutaneous daily  glucagon  Injectable 1 milliGRAM(s) IntraMuscular once  insulin lispro (ADMELOG) corrective regimen sliding scale   SubCutaneous three times a day before meals  insulin lispro (ADMELOG) corrective regimen sliding scale   SubCutaneous at bedtime  pantoprazole    Tablet 40 milliGRAM(s) Oral before breakfast  polyethylene glycol 3350 17 Gram(s) Oral two times a day  senna 2 Tablet(s) Oral at bedtime    MEDICATIONS  (PRN):  acetaminophen   Tablet .. 650 milliGRAM(s) Oral every 6 hours PRN Mild Pain (1 - 3)  benzocaine 15 mG/menthol 3.6 mG (Sugar-Free) Lozenge 1 Lozenge Oral every 6 hours PRN Sore Throat  bisacodyl 10 milliGRAM(s) Oral daily PRN Constipation    Vital Signs Last 24 Hrs  T(F): 97.5 (24 Aug 2021 07:35), Max: 97.9 (23 Aug 2021 21:24)  HR: 57 (24 Aug 2021 07:35) (57 - 76)  BP: 107/68 (24 Aug 2021 07:35) (107/68 - 108/72)  RR: 16 (24 Aug 2021 07:35) (16 - 16)  SpO2: 95% (24 Aug 2021 07:35) (94% - 95%)  I&O's Summary      PHYSICAL EXAM:  GENERAL: NAD, well-groomed  HEAD:  Atraumatic, Normocephalic  EYES: PEERL, conjunctiva and sclera clear  ENMT: Moist mucous membranes, Good dentition, no thrush  CHEST/LUNG: Clear to auscultation bilaterally, non-labored breathing  HEART: RRR; S1/S2, No murmur  ABDOMEN: Soft, Nontender, Nondistended; Bowel sounds present  EXTREMITIES: No calf tenderness, No cyanosis, No edema  PSYCH: depressed appearing, flat affect  NERVOUS SYSTEM:  A/O x3, Good concentration      LABS:                        13.1   6.98  )-----------( 273      ( 23 Aug 2021 05:59 )             38.9     08-23    140  |  105  |  18  ----------------------------<  138  3.6   |  25  |  0.80    Ca    9.3      23 Aug 2021 05:59    TPro  6.9  /  Alb  3.7  /  TBili  0.5  /  DBili  x   /  AST  22  /  ALT  40  /  AlkPhos  89  08-23    eGFR if Non African American: 87 mL/min/1.73M2 (08-23-21 @ 05:59)  eGFR if African American: 101 mL/min/1.73M2 (08-23-21 @ 05:59)            08-15 Chol 170 mg/dL LDL -- HDL 57 mg/dL Trig 139 mg/dL              POCT Blood Glucose.: 138 mg/dL (24 Aug 2021 07:30)  POCT Blood Glucose.: 130 mg/dL (23 Aug 2021 21:21)  POCT Blood Glucose.: 141 mg/dL (23 Aug 2021 17:17)  POCT Blood Glucose.: 124 mg/dL (23 Aug 2021 12:19)          COVID-19 PCR: NotDetec (08-20-21 @ 05:30)      RADIOLOGY & ADDITIONAL TESTS:    Care Discussed with Consultants/Other Providers: discussed with Dr. Hammer  
This is a 49 y/o female with PMH of T2DM not on prescribe medications, HLD, B12 deficiency with neuropathy, migraines presented to ED at [facility] on 8/14 with acute weakness, tingling of upper and lower extremities, right > left. In ED, BP was 160/118 and CT brain was negative for acute pathology. Patient was admitted to telemetry. MRI head w/o contrast: no acute findings. TTE results: normal. ASA and lipitor started.   On 8/16, ENT consulted for burning and tightness while eating. Scoped patient and did not find an acute pathology to cause these symptoms.   PM&R had been following patient. Stroke work up was negative and physiatrist felt symptoms may be related to conversion syndrome. PT and OT saw patient. Patient deemed a candidate for acute IPR at [facility] and transferred on 8/19.    ROS  feels Well  Moved Bowels 2x yesterday with miralax, voiding well  eating more food  claims strength increasing  no dizziness, no headaches  no chest pain, no SOB  No nausea, no vomiting    MEDICATIONS  (STANDING):  artificial  tears Solution 1 Drop(s) Both EYES two times a day  aspirin enteric coated 81 milliGRAM(s) Oral daily  atorvastatin 80 milliGRAM(s) Oral at bedtime  cyanocobalamin 1000 MICROGram(s) Oral daily  dextrose 40% Gel 15 Gram(s) Oral once  dextrose 5%. 1000 milliLiter(s) (50 mL/Hr) IV Continuous <Continuous>  dextrose 5%. 1000 milliLiter(s) (100 mL/Hr) IV Continuous <Continuous>  dextrose 50% Injectable 25 Gram(s) IV Push once  dextrose 50% Injectable 12.5 Gram(s) IV Push once  dextrose 50% Injectable 25 Gram(s) IV Push once  enoxaparin Injectable 40 milliGRAM(s) SubCutaneous daily  glucagon  Injectable 1 milliGRAM(s) IntraMuscular once  insulin lispro (ADMELOG) corrective regimen sliding scale   SubCutaneous three times a day before meals  insulin lispro (ADMELOG) corrective regimen sliding scale   SubCutaneous at bedtime  pantoprazole    Tablet 40 milliGRAM(s) Oral before breakfast  polyethylene glycol 3350 17 Gram(s) Oral daily  senna 2 Tablet(s) Oral at bedtime    MEDICATIONS  (PRN):  acetaminophen   Tablet .. 650 milliGRAM(s) Oral every 6 hours PRN Mild Pain (1 - 3)  benzocaine 15 mG/menthol 3.6 mG (Sugar-Free) Lozenge 1 Lozenge Oral every 6 hours PRN Sore Throat                            14.3   6.38  )-----------( 299      ( 20 Aug 2021 05:45 )             43.0     08-20    142  |  106  |  10  ----------------------------<  131<H>  3.5   |  26  |  0.79    Ca    9.5      20 Aug 2021 05:45    TPro  7.0  /  Alb  3.8  /  TBili  0.7  /  DBili  x   /  AST  16  /  ALT  36  /  AlkPhos  84  08-20      CAPILLARY BLOOD GLUCOSE      POCT Blood Glucose.: 188 mg/dL (22 Aug 2021 11:58)  POCT Blood Glucose.: 138 mg/dL (22 Aug 2021 08:12)  POCT Blood Glucose.: 146 mg/dL (21 Aug 2021 21:37)  POCT Blood Glucose.: 162 mg/dL (21 Aug 2021 17:10)        Vital Signs Last 24 Hrs  T(C): 36.6 (21 Aug 2021 21:40), Max: 36.6 (21 Aug 2021 21:40)  T(F): 97.8 (21 Aug 2021 21:40), Max: 97.8 (21 Aug 2021 21:40)  HR: 76 (21 Aug 2021 21:40) (76 - 76)  BP: 102/67 (21 Aug 2021 21:40) (102/67 - 102/67)  BP(mean): --  RR: 14 (21 Aug 2021 21:40) (14 - 14)  SpO2: 98% (21 Aug 2021 21:40) (98% - 98%)    General: NAD, affect apropriate                              HEENT: NC/AT, EOM I, PERRLA, Normal Conjunctivae  Cardio: RRR, Normal S1-S2, No M/G/R                              Pulm: No Respiratory Distress,  diminished Lung sounds                        Abdomen: ND/NT, Soft, hypoactive BS+                                                MSK: No joint swelling, Full ROM                                         Ext: No C/C/E, Pulses 2+ throughout, No calf tenderness    Skin:  all skin intact                                                                 Wounds: none  Decubitus Ulcers: None Present     Neurological Examination    Cognitive: AAO x 3                                                                         Attention: Intact   Judgment: Good evidence of judgement                               Memory: Recall 3 objects immediate and 3 min later      Mood/Affect: wnl                                                                           Communication:  Fluent,  No dysarthria   CN II - XII  intact                                                                        Sensory: impaired  to light touch on right side                                                                                              Tone: normal Throughout   Balance: impaired    Motor    >3/5 all extremities - Breaks on testing all MS      Rehab eval in progress
This is a 49 y/o female with PMH of T2DM not on prescribe medications, HLD, B12 deficiency with neuropathy, migraines presented to ED at [facility] on 8/14 with acute weakness, tingling of upper and lower extremities, right > left. In ED, BP was 160/118 and CT brain was negative for acute pathology. Patient was admitted to telemetry. MRI head w/o contrast: no acute findings. TTE results: normal. ASA and lipitor started.   On 8/16, ENT consulted for burning and tightness while eating. Scoped patient and did not find an acute pathology to cause these symptoms.   PM&R had been following patient. Stroke work up was negative and physiatrist felt symptoms may be related to conversion syndrome. PT and OT saw patient. Patient deemed a candidate for acute IPR at [facility] and transferred on 8/19.    ROS  headache this AM- will increase tylenol dosing  last BM 8/23, voiding well  did not take miralax yesterday or today  eating more food  claims strength increasing  no dizziness, no headaches  no chest pain, no SOB  No nausea, no vomiting    MEDICATIONS  (STANDING):  artificial  tears Solution 1 Drop(s) Both EYES two times a day  aspirin enteric coated 81 milliGRAM(s) Oral daily  atorvastatin 80 milliGRAM(s) Oral at bedtime  cyanocobalamin 1000 MICROGram(s) Oral daily  dextrose 40% Gel 15 Gram(s) Oral once  dextrose 5%. 1000 milliLiter(s) (50 mL/Hr) IV Continuous <Continuous>  dextrose 5%. 1000 milliLiter(s) (100 mL/Hr) IV Continuous <Continuous>  dextrose 50% Injectable 25 Gram(s) IV Push once  dextrose 50% Injectable 12.5 Gram(s) IV Push once  dextrose 50% Injectable 25 Gram(s) IV Push once  enoxaparin Injectable 40 milliGRAM(s) SubCutaneous daily  glucagon  Injectable 1 milliGRAM(s) IntraMuscular once  insulin lispro (ADMELOG) corrective regimen sliding scale   SubCutaneous three times a day before meals  insulin lispro (ADMELOG) corrective regimen sliding scale   SubCutaneous at bedtime  pantoprazole    Tablet 40 milliGRAM(s) Oral before breakfast  polyethylene glycol 3350 17 Gram(s) Oral two times a day  senna 2 Tablet(s) Oral at bedtime    MEDICATIONS  (PRN):  acetaminophen   Tablet .. 650 milliGRAM(s) Oral every 6 hours PRN Mild Pain (1 - 3)  benzocaine 15 mG/menthol 3.6 mG (Sugar-Free) Lozenge 1 Lozenge Oral every 6 hours PRN Sore Throat  bisacodyl 10 milliGRAM(s) Oral daily PRN Constipation                            13.1   6.98  )-----------( 273      ( 23 Aug 2021 05:59 )             38.9     08-23    140  |  105  |  18  ----------------------------<  138<H>  3.6   |  25  |  0.80    Ca    9.3      23 Aug 2021 05:59    TPro  6.9  /  Alb  3.7  /  TBili  0.5  /  DBili  x   /  AST  22  /  ALT  40  /  AlkPhos  89  08-23      CAPILLARY BLOOD GLUCOSE      POCT Blood Glucose.: 132 mg/dL (25 Aug 2021 07:25)  POCT Blood Glucose.: 126 mg/dL (24 Aug 2021 21:13)  POCT Blood Glucose.: 213 mg/dL (24 Aug 2021 16:29)  POCT Blood Glucose.: 142 mg/dL (24 Aug 2021 11:34)        Vital Signs Last 24 Hrs  T(C): 36.3 (25 Aug 2021 08:54), Max: 36.7 (24 Aug 2021 20:47)  T(F): 97.4 (25 Aug 2021 08:54), Max: 98.1 (24 Aug 2021 20:47)  HR: 69 (25 Aug 2021 08:54) (69 - 80)  BP: 112/71 (25 Aug 2021 08:54) (92/64 - 112/71)  BP(mean): --  RR: 15 (25 Aug 2021 08:54) (15 - 16)  SpO2: 99% (25 Aug 2021 08:54) (99% - 99%)      Physical Exam:   General: NAD, affect appropriate                              HEENT: NC/AT, EOM I, PERRLA, Normal Conjunctivae  Cardio: RRR, Normal S1-S2, No M/G/R                              Pulm: No Respiratory Distress,  diminished Lung sounds                        Abdomen: ND/NT, Soft, hypoactive BS+                                                MSK: No joint swelling, Full ROM                                         Ext: No C/C/E, Pulses 2+ throughout, No calf tenderness    Skin:  all skin intact                                                                 Wounds: none  Decubitus Ulcers: None Present     Neurological Examination    Cognitive: AAO x 3                                                                         Attention: Intact   Judgment: Good evidence of judgement                               Memory: Recall 3 objects immediate and 3 min later      Mood/Affect: wnl                                                                           Communication:  Fluent,  No dysarthria   CN II - XII  intact                                                                        Sensory: impaired  to light touch on right side                                                                                              Tone: normal Throughout   Balance: impaired    Motor    >3/5 all extremities - Breaks on testing all MS        IDT meeting 8/23    ST: mild cog deficits with memory, short term and working memory. decreased processing time     OT: sup, set up for eating and grooming   CG toilet and shower transfer   min A dressing and bathing -last week eval         PT: CS, hand held assist   150ft ambulating    goals: independent     Tentative dc home 8/27 with family.    Rehab eval in progress
This is a 49 y/o female with PMH of T2DM not on prescribe medications, HLD, B12 deficiency with neuropathy, migraines presented to ED at [facility] on 8/14 with acute weakness, tingling of upper and lower extremities, right > left. In ED, BP was 160/118 and CT brain was negative for acute pathology. Patient was admitted to telemetry. MRI head w/o contrast: no acute findings. TTE results: normal. ASA and lipitor started.   On 8/16, ENT consulted for burning and tightness while eating. Scoped patient and did not find an acute pathology to cause these symptoms.   PM&R had been following patient. Stroke work up was negative and physiatrist felt symptoms may be related to conversion syndrome. PT and OT saw patient. Patient deemed a candidate for acute IPR at [facility] and transferred on 8/19.    ROS  headache this AM- will increase tylenol dosing  last BM 8/24, voiding well  did not take miralax yesterday or today  eating more food  reports strength increasing  no dizziness, no headaches  no chest pain, no SOB  No nausea, no vomiting  Would like to go home today, daughter is available to pick her up later today       MEDICATIONS  (STANDING):  artificial  tears Solution 1 Drop(s) Both EYES two times a day  aspirin enteric coated 81 milliGRAM(s) Oral daily  atorvastatin 80 milliGRAM(s) Oral at bedtime  cyanocobalamin 1000 MICROGram(s) Oral daily  dextrose 40% Gel 15 Gram(s) Oral once  dextrose 5%. 1000 milliLiter(s) (50 mL/Hr) IV Continuous <Continuous>  dextrose 5%. 1000 milliLiter(s) (100 mL/Hr) IV Continuous <Continuous>  dextrose 50% Injectable 25 Gram(s) IV Push once  dextrose 50% Injectable 12.5 Gram(s) IV Push once  dextrose 50% Injectable 25 Gram(s) IV Push once  enoxaparin Injectable 40 milliGRAM(s) SubCutaneous daily  glucagon  Injectable 1 milliGRAM(s) IntraMuscular once  insulin lispro (ADMELOG) corrective regimen sliding scale   SubCutaneous three times a day before meals  insulin lispro (ADMELOG) corrective regimen sliding scale   SubCutaneous at bedtime  pantoprazole    Tablet 40 milliGRAM(s) Oral before breakfast  polyethylene glycol 3350 17 Gram(s) Oral two times a day  senna 2 Tablet(s) Oral at bedtime    MEDICATIONS  (PRN):  acetaminophen   Tablet .. 650 milliGRAM(s) Oral every 6 hours PRN Mild Pain (1 - 3)  benzocaine 15 mG/menthol 3.6 mG (Sugar-Free) Lozenge 1 Lozenge Oral every 6 hours PRN Sore Throat  bisacodyl 10 milliGRAM(s) Oral daily PRN Constipation                 08-26    144  |  107  |  14  ----------------------------<  128<H>  4.1   |  25  |  0.78    Ca    9.8      26 Aug 2021 07:30    TPro  7.6  /  Alb  4.2  /  TBili  0.6  /  DBili  x   /  AST  25  /  ALT  41  /  AlkPhos  102  08-26                            14.6   7.66  )-----------( 263      ( 26 Aug 2021 07:30 )             42.7       CAPILLARY BLOOD GLUCOSE  POCT Blood Glucose.: 148 mg/dL (26 Aug 2021 07:43)  POCT Blood Glucose.: 147 mg/dL (25 Aug 2021 20:56)  POCT Blood Glucose.: 126 mg/dL (25 Aug 2021 16:34)  POCT Blood Glucose.: 130 mg/dL (25 Aug 2021 11:33)          Vital Signs Last 24 Hrs  T(C): 36.7 (26 Aug 2021 07:40), Max: 36.7 (25 Aug 2021 19:55)  T(F): 98 (26 Aug 2021 07:40), Max: 98.1 (25 Aug 2021 19:55)  HR: 71 (26 Aug 2021 07:40) (71 - 81)  BP: 120/75 (26 Aug 2021 07:40) (120/75 - 120/87)  RR: 15 (26 Aug 2021 07:40) (15 - 16)  SpO2: 98% (26 Aug 2021 07:40) (98% - 99%)      Physical Exam:   General: NAD, affect appropriate                              HEENT: NC/AT, EOM I, PERRLA, Normal Conjunctivae  Cardio: RRR, Normal S1-S2, No M/G/R                              Pulm: No Respiratory Distress,  diminished Lung sounds                        Abdomen: ND/NT, Soft, hypoactive BS+                                                MSK: No joint swelling, Full ROM                                         Ext: No C/C/E, Pulses 2+ throughout, No calf tenderness    Skin:  all skin intact                                                                 Wounds: none  Decubitus Ulcers: None Present     Neurological Examination    Cognitive: AAO x 3                                                                         Attention: Intact   Judgment: Good evidence of judgement                               Memory: Recall 3 objects immediate and 3 min later      Mood/Affect: wnl                                                                           Communication:  Fluent,  No dysarthria   CN II - XII  intact                                                                        Sensory: impaired  to light touch on right side                                                                                              Tone: normal Throughout   Balance: impaired    Motor    >3/5 all extremities - Breaks on testing all MS        IDT meeting 8/23    ST: mild cog deficits with memory, short term and working memory. decreased processing time     OT: sup, set up for eating and grooming   CG toilet and shower transfer   min A dressing and bathing -last week eval         PT: CS, hand held assist   150ft ambulating    goals: independent     Tentative dc home 8/27 with family.    
This is a 51 y/o female with PMH of T2DM not on prescribe medications, HLD, B12 deficiency with neuropathy, migraines presented to ED at [facility] on 8/14 with acute weakness, tingling of upper and lower extremities, right > left. In ED, BP was 160/118 and CT brain was negative for acute pathology. Patient was admitted to telemetry. MRI head w/o contrast: no acute findings. TTE results: normal. ASA and lipitor started.   On 8/16, ENT consulted for burning and tightness while eating. Scoped patient and did not find an acute pathology to cause these symptoms.   PM&R had been following patient. Stroke work up was negative and physiatrist felt symptoms may be related to conversion syndrome. PT and OT saw patient. Patient deemed a candidate for acute IPR at [facility] and transferred on 8/19.    ROS  feels Well  last BM 8/21, voiding well  did not take miralax yesterday or today  eating more food  claims strength increasing  no dizziness, no headaches  no chest pain, no SOB  No nausea, no vomiting    MEDICATIONS  (STANDING):  artificial  tears Solution 1 Drop(s) Both EYES two times a day  aspirin enteric coated 81 milliGRAM(s) Oral daily  atorvastatin 80 milliGRAM(s) Oral at bedtime  cyanocobalamin 1000 MICROGram(s) Oral daily  dextrose 40% Gel 15 Gram(s) Oral once  dextrose 5%. 1000 milliLiter(s) (50 mL/Hr) IV Continuous <Continuous>  dextrose 5%. 1000 milliLiter(s) (100 mL/Hr) IV Continuous <Continuous>  dextrose 50% Injectable 25 Gram(s) IV Push once  dextrose 50% Injectable 12.5 Gram(s) IV Push once  dextrose 50% Injectable 25 Gram(s) IV Push once  enoxaparin Injectable 40 milliGRAM(s) SubCutaneous daily  glucagon  Injectable 1 milliGRAM(s) IntraMuscular once  insulin lispro (ADMELOG) corrective regimen sliding scale   SubCutaneous three times a day before meals  insulin lispro (ADMELOG) corrective regimen sliding scale   SubCutaneous at bedtime  pantoprazole    Tablet 40 milliGRAM(s) Oral before breakfast  polyethylene glycol 3350 17 Gram(s) Oral two times a day  senna 2 Tablet(s) Oral at bedtime    MEDICATIONS  (PRN):  acetaminophen   Tablet .. 650 milliGRAM(s) Oral every 6 hours PRN Mild Pain (1 - 3)  benzocaine 15 mG/menthol 3.6 mG (Sugar-Free) Lozenge 1 Lozenge Oral every 6 hours PRN Sore Throat  bisacodyl 10 milliGRAM(s) Oral daily PRN Constipation                            13.1   6.98  )-----------( 273      ( 23 Aug 2021 05:59 )             38.9     08-23    140  |  105  |  18  ----------------------------<  138<H>  3.6   |  25  |  0.80    Ca    9.3      23 Aug 2021 05:59    TPro  6.9  /  Alb  3.7  /  TBili  0.5  /  DBili  x   /  AST  22  /  ALT  40  /  AlkPhos  89  08-23        CAPILLARY BLOOD GLUCOSE      POCT Blood Glucose.: 124 mg/dL (23 Aug 2021 12:19)  POCT Blood Glucose.: 155 mg/dL (23 Aug 2021 08:04)  POCT Blood Glucose.: 132 mg/dL (22 Aug 2021 21:24)  POCT Blood Glucose.: 108 mg/dL (22 Aug 2021 17:15)      Vital Signs Last 24 Hrs  T(C): 36.4 (23 Aug 2021 08:00), Max: 36.4 (22 Aug 2021 22:55)  T(F): 97.6 (23 Aug 2021 08:00), Max: 97.6 (23 Aug 2021 08:00)  HR: 73 (23 Aug 2021 08:00) (73 - 75)  BP: 115/81 (23 Aug 2021 08:00) (107/69 - 115/81)  BP(mean): --  RR: 16 (23 Aug 2021 08:00) (16 - 16)  SpO2: 100% (23 Aug 2021 08:00) (99% - 100%)      General: NAD, affect apropriate                              HEENT: NC/AT, EOM I, PERRLA, Normal Conjunctivae  Cardio: RRR, Normal S1-S2, No M/G/R                              Pulm: No Respiratory Distress,  diminished Lung sounds                        Abdomen: ND/NT, Soft, hypoactive BS+                                                MSK: No joint swelling, Full ROM                                         Ext: No C/C/E, Pulses 2+ throughout, No calf tenderness    Skin:  all skin intact                                                                 Wounds: none  Decubitus Ulcers: None Present     Neurological Examination    Cognitive: AAO x 3                                                                         Attention: Intact   Judgment: Good evidence of judgement                               Memory: Recall 3 objects immediate and 3 min later      Mood/Affect: wnl                                                                           Communication:  Fluent,  No dysarthria   CN II - XII  intact                                                                        Sensory: impaired  to light touch on right side                                                                                              Tone: normal Throughout   Balance: impaired    Motor    >3/5 all extremities - Breaks on testing all MS        IDT meeting 8/23    ST: mild cog deficits with memory, short term and working memory. decreased processing time     OT: sup, set up for eating and grooming   CG toilet and shower transfer   min A dressing and bathing -last week eval         PT: CS, hand held assist   150ft ambulating    goals: independent     Tentative dc home 8/27 with family.    Rehab eval in progress
This is a 51 y/o female with PMH of T2DM not on prescribe medications, HLD, B12 deficiency with neuropathy, migraines presented to ED at [facility] on 8/14 with acute weakness, tingling of upper and lower extremities, right > left. In ED, BP was 160/118 and CT brain was negative for acute pathology. Patient was admitted to telemetry. MRI head w/o contrast: no acute findings. TTE results: normal. ASA and lipitor started.   On 8/16, ENT consulted for burning and tightness while eating. Scoped patient and did not find an acute pathology to cause these symptoms.   PM&R had been following patient. Stroke work up was negative and physiatrist felt symptoms may be related to conversion syndrome. PT and OT saw patient. Patient deemed a candidate for acute IPR at [facility] and transferred on 8/19.    ROS  feels Well  last BM 8/23, voiding well  did not take miralax yesterday or today  eating more food  claims strength increasing  no dizziness, no headaches  no chest pain, no SOB  No nausea, no vomiting    MEDICATIONS  (STANDING):  artificial  tears Solution 1 Drop(s) Both EYES two times a day  aspirin enteric coated 81 milliGRAM(s) Oral daily  atorvastatin 80 milliGRAM(s) Oral at bedtime  cyanocobalamin 1000 MICROGram(s) Oral daily  dextrose 40% Gel 15 Gram(s) Oral once  dextrose 5%. 1000 milliLiter(s) (50 mL/Hr) IV Continuous <Continuous>  dextrose 5%. 1000 milliLiter(s) (100 mL/Hr) IV Continuous <Continuous>  dextrose 50% Injectable 25 Gram(s) IV Push once  dextrose 50% Injectable 12.5 Gram(s) IV Push once  dextrose 50% Injectable 25 Gram(s) IV Push once  enoxaparin Injectable 40 milliGRAM(s) SubCutaneous daily  glucagon  Injectable 1 milliGRAM(s) IntraMuscular once  insulin lispro (ADMELOG) corrective regimen sliding scale   SubCutaneous three times a day before meals  insulin lispro (ADMELOG) corrective regimen sliding scale   SubCutaneous at bedtime  pantoprazole    Tablet 40 milliGRAM(s) Oral before breakfast  polyethylene glycol 3350 17 Gram(s) Oral two times a day  senna 2 Tablet(s) Oral at bedtime    MEDICATIONS  (PRN):  acetaminophen   Tablet .. 650 milliGRAM(s) Oral every 6 hours PRN Mild Pain (1 - 3)  benzocaine 15 mG/menthol 3.6 mG (Sugar-Free) Lozenge 1 Lozenge Oral every 6 hours PRN Sore Throat  bisacodyl 10 milliGRAM(s) Oral daily PRN Constipation                            13.1   6.98  )-----------( 273      ( 23 Aug 2021 05:59 )             38.9     08-23    140  |  105  |  18  ----------------------------<  138<H>  3.6   |  25  |  0.80    Ca    9.3      23 Aug 2021 05:59    TPro  6.9  /  Alb  3.7  /  TBili  0.5  /  DBili  x   /  AST  22  /  ALT  40  /  AlkPhos  89  08-23      CAPILLARY BLOOD GLUCOSE  POCT Blood Glucose.: 142 mg/dL (24 Aug 2021 11:34)  POCT Blood Glucose.: 138 mg/dL (24 Aug 2021 07:30)  POCT Blood Glucose.: 130 mg/dL (23 Aug 2021 21:21)  POCT Blood Glucose.: 141 mg/dL (23 Aug 2021 17:17)      Vital Signs Last 24 Hrs  T(C): 36.4 (24 Aug 2021 07:35), Max: 36.6 (23 Aug 2021 21:24)  T(F): 97.5 (24 Aug 2021 07:35), Max: 97.9 (23 Aug 2021 21:24)  HR: 57 (24 Aug 2021 07:35) (57 - 76)  BP: 107/68 (24 Aug 2021 07:35) (107/68 - 108/72)  RR: 16 (24 Aug 2021 07:35) (16 - 16)  SpO2: 95% (24 Aug 2021 07:35) (94% - 95%)      Physical Exam:   General: NAD, affect appropriate                              HEENT: NC/AT, EOM I, PERRLA, Normal Conjunctivae  Cardio: RRR, Normal S1-S2, No M/G/R                              Pulm: No Respiratory Distress,  diminished Lung sounds                        Abdomen: ND/NT, Soft, hypoactive BS+                                                MSK: No joint swelling, Full ROM                                         Ext: No C/C/E, Pulses 2+ throughout, No calf tenderness    Skin:  all skin intact                                                                 Wounds: none  Decubitus Ulcers: None Present     Neurological Examination    Cognitive: AAO x 3                                                                         Attention: Intact   Judgment: Good evidence of judgement                               Memory: Recall 3 objects immediate and 3 min later      Mood/Affect: wnl                                                                           Communication:  Fluent,  No dysarthria   CN II - XII  intact                                                                        Sensory: impaired  to light touch on right side                                                                                              Tone: normal Throughout   Balance: impaired    Motor    >3/5 all extremities - Breaks on testing all MS        IDT meeting 8/23    ST: mild cog deficits with memory, short term and working memory. decreased processing time     OT: sup, set up for eating and grooming   CG toilet and shower transfer   min A dressing and bathing -last week eval         PT: CS, hand held assist   150ft ambulating    goals: independent     Tentative dc home 8/27 with family.    Rehab eval in progress
This is a 51 y/o female with PMH of T2DM not on prescribe medications, HLD, B12 deficiency with neuropathy, migraines presented to ED at [facility] on 8/14 with acute weakness, tingling of upper and lower extremities, right > left. In ED, BP was 160/118 and CT brain was negative for acute pathology. Patient was admitted to telemetry. MRI head w/o contrast: no acute findings. TTE results: normal. ASA and lipitor started.   On 8/16, ENT consulted for burning and tightness while eating. Scoped patient and did not find an acute pathology to cause these symptoms.   PM&R had been following patient. Stroke work up was negative and physiatrist felt symptoms may be related to conversion syndrome. PT and OT saw patient. Patient deemed a candidate for acute IPR at [facility] and transferred on 8/19.    ROS  feels Well  no BM , Voiding initial PVR 10cc  claims strength increasing  no dizziness, no headaches  no chest pain, no SOB  No nausea, no vomiting    MEDICATIONS  (STANDING):  artificial  tears Solution 1 Drop(s) Both EYES two times a day  aspirin enteric coated 81 milliGRAM(s) Oral daily  atorvastatin 80 milliGRAM(s) Oral at bedtime  cyanocobalamin 1000 MICROGram(s) Oral daily  dextrose 40% Gel 15 Gram(s) Oral once  dextrose 5%. 1000 milliLiter(s) (50 mL/Hr) IV Continuous <Continuous>  dextrose 5%. 1000 milliLiter(s) (100 mL/Hr) IV Continuous <Continuous>  dextrose 50% Injectable 25 Gram(s) IV Push once  dextrose 50% Injectable 12.5 Gram(s) IV Push once  dextrose 50% Injectable 25 Gram(s) IV Push once  enoxaparin Injectable 40 milliGRAM(s) SubCutaneous daily  glucagon  Injectable 1 milliGRAM(s) IntraMuscular once  insulin lispro (ADMELOG) corrective regimen sliding scale   SubCutaneous three times a day before meals  insulin lispro (ADMELOG) corrective regimen sliding scale   SubCutaneous at bedtime  pantoprazole    Tablet 40 milliGRAM(s) Oral before breakfast  polyethylene glycol 3350 17 Gram(s) Oral daily  senna 2 Tablet(s) Oral at bedtime    MEDICATIONS  (PRN):  acetaminophen   Tablet .. 650 milliGRAM(s) Oral every 6 hours PRN Mild Pain (1 - 3)  benzocaine 15 mG/menthol 3.6 mG (Sugar-Free) Lozenge 1 Lozenge Oral every 6 hours PRN Sore Throat                            14.3   6.38  )-----------( 299      ( 20 Aug 2021 05:45 )             43.0     08-20    142  |  106  |  10  ----------------------------<  131<H>  3.5   |  26  |  0.79    Ca    9.5      20 Aug 2021 05:45    TPro  7.0  /  Alb  3.8  /  TBili  0.7  /  DBili  x   /  AST  16  /  ALT  36  /  AlkPhos  84  08-20        CAPILLARY BLOOD GLUCOSE      POCT Blood Glucose.: 166 mg/dL (21 Aug 2021 12:16)  POCT Blood Glucose.: 147 mg/dL (21 Aug 2021 07:54)  POCT Blood Glucose.: 130 mg/dL (20 Aug 2021 21:53)  POCT Blood Glucose.: 129 mg/dL (20 Aug 2021 16:34)      Vital Signs Last 24 Hrs  T(C): 36.7 (21 Aug 2021 07:30), Max: 36.9 (20 Aug 2021 20:32)  T(F): 98 (21 Aug 2021 07:30), Max: 98.4 (20 Aug 2021 20:32)  HR: 66 (21 Aug 2021 07:30) (66 - 80)  BP: 106/64 (21 Aug 2021 07:30) (106/64 - 143/88)  BP(mean): --  RR: 16 (21 Aug 2021 07:30) (16 - 17)  SpO2: 98% (21 Aug 2021 07:30) (98% - 98%)    General: NAD, very emotional and teary                               HEENT: NC/AT, EOM I, PERRLA, Normal Conjunctivae  Cardio: RRR, Normal S1-S2, No M/G/R                              Pulm: No Respiratory Distress,  diminished Lung sounds                        Abdomen: ND/NT, Soft, hypoactive BS+                                                MSK: No joint swelling, Full ROM                                         Ext: No C/C/E, Pulses 2+ throughout, No calf tenderness    Skin:  all skin intact                                                                 Wounds: none  Decubitus Ulcers: None Present     Neurological Examination    Cognitive: AAO x 3                                                                         Attention: Intact   Judgment: Good evidence of judgement                               Memory: Recall 3 objects immediate and 3 min later      Mood/Affect: wnl                                                                           Communication:  Fluent,  No dysarthria   CN II - XII  intact                                                                        Sensory: impaired  to light touch on right side                                                                                              Tone: normal Throughout   Balance: impaired    Motor    >3/5 all extremities - Breaks on testing all MS      Rehab eval in progress
Patient is a 50y old  Female who presents with a chief complaint of conversion disorder (22 Aug 2021 14:17)    Patient seen and examined at bedside. Patient with no acute events overnight, denies any acute complaints at this time. Patient denies chest pain, sob, palpitations, abd pain, numbness or tingling. Patient does appear a bit sad however states she is doing well with PT.    ALLERGIES:  penicillin (Unknown)    MEDICATIONS  (STANDING):  artificial  tears Solution 1 Drop(s) Both EYES two times a day  aspirin enteric coated 81 milliGRAM(s) Oral daily  atorvastatin 80 milliGRAM(s) Oral at bedtime  cyanocobalamin 1000 MICROGram(s) Oral daily  dextrose 40% Gel 15 Gram(s) Oral once  dextrose 5%. 1000 milliLiter(s) (50 mL/Hr) IV Continuous <Continuous>  dextrose 5%. 1000 milliLiter(s) (100 mL/Hr) IV Continuous <Continuous>  dextrose 50% Injectable 25 Gram(s) IV Push once  dextrose 50% Injectable 12.5 Gram(s) IV Push once  dextrose 50% Injectable 25 Gram(s) IV Push once  enoxaparin Injectable 40 milliGRAM(s) SubCutaneous daily  glucagon  Injectable 1 milliGRAM(s) IntraMuscular once  insulin lispro (ADMELOG) corrective regimen sliding scale   SubCutaneous three times a day before meals  insulin lispro (ADMELOG) corrective regimen sliding scale   SubCutaneous at bedtime  pantoprazole    Tablet 40 milliGRAM(s) Oral before breakfast  polyethylene glycol 3350 17 Gram(s) Oral two times a day  senna 2 Tablet(s) Oral at bedtime    MEDICATIONS  (PRN):  acetaminophen   Tablet .. 650 milliGRAM(s) Oral every 6 hours PRN Mild Pain (1 - 3)  benzocaine 15 mG/menthol 3.6 mG (Sugar-Free) Lozenge 1 Lozenge Oral every 6 hours PRN Sore Throat  bisacodyl 10 milliGRAM(s) Oral daily PRN Constipation    Vital Signs Last 24 Hrs  T(F): 97.6 (23 Aug 2021 08:00), Max: 97.6 (23 Aug 2021 08:00)  HR: 73 (23 Aug 2021 08:00) (73 - 75)  BP: 115/81 (23 Aug 2021 08:00) (107/69 - 115/81)  RR: 16 (23 Aug 2021 08:00) (16 - 16)  SpO2: 100% (23 Aug 2021 08:00) (99% - 100%)  I&O's Summary      PHYSICAL EXAM:  GENERAL: NAD, well-groomed  HEAD:  Atraumatic, Normocephalic  EYES: PEERL, conjunctiva and sclera clear  ENMT: Moist mucous membranes, Good dentition, no thrush  CHEST/LUNG: Clear to auscultation bilaterally, non-labored breathing  HEART: RRR; S1/S2, No murmur  ABDOMEN: Soft, Nontender, Nondistended; Bowel sounds present  EXTREMITIES: No calf tenderness, No cyanosis, No edema  PSYCH: depressed appearing, flat affect  NERVOUS SYSTEM:  A/O x3, Good concentration    LABS:                        13.1   6.98  )-----------( 273      ( 23 Aug 2021 05:59 )             38.9     08-23    140  |  105  |  18  ----------------------------<  138  3.6   |  25  |  0.80    Ca    9.3      23 Aug 2021 05:59    TPro  6.9  /  Alb  3.7  /  TBili  0.5  /  DBili  x   /  AST  22  /  ALT  40  /  AlkPhos  89  08-23    eGFR if Non African American: 87 mL/min/1.73M2 (08-23-21 @ 05:59)  eGFR if African American: 101 mL/min/1.73M2 (08-23-21 @ 05:59)            08-15 Chol 170 mg/dL LDL -- HDL 57 mg/dL Trig 139 mg/dL              POCT Blood Glucose.: 155 mg/dL (23 Aug 2021 08:04)  POCT Blood Glucose.: 132 mg/dL (22 Aug 2021 21:24)  POCT Blood Glucose.: 108 mg/dL (22 Aug 2021 17:15)  POCT Blood Glucose.: 188 mg/dL (22 Aug 2021 11:58)          Culture - Urine (collected 16 Aug 2021 21:27)  Source: Clean Catch Clean Catch (Midstream)  Final Report (17 Aug 2021 19:43):    <10,000 CFU/mL Normal Urogenital Chantal      COVID-19 PCR: NotDetec (08-20-21 @ 05:30)      RADIOLOGY & ADDITIONAL TESTS:    Care Discussed with Consultants/Other Providers: discussed with Dr. Hammer  
Patient is a 50y old  Female who presents with a chief complaint of conversion disorder (25 Aug 2021 10:44)      Patient seen and examined at bedside. No events overnight. Patient admits to headache this morning and is upset due to breakfast that was brought up for her, however is eating more food. Patient reports doing well with PT, denies chest pain, palpitations, SOB, nausea or vomiting.    ALLERGIES:  penicillin (Unknown)    MEDICATIONS  (STANDING):  artificial  tears Solution 1 Drop(s) Both EYES two times a day  aspirin enteric coated 81 milliGRAM(s) Oral daily  atorvastatin 80 milliGRAM(s) Oral at bedtime  cyanocobalamin 1000 MICROGram(s) Oral daily  dextrose 40% Gel 15 Gram(s) Oral once  dextrose 5%. 1000 milliLiter(s) (50 mL/Hr) IV Continuous <Continuous>  dextrose 5%. 1000 milliLiter(s) (100 mL/Hr) IV Continuous <Continuous>  dextrose 50% Injectable 25 Gram(s) IV Push once  dextrose 50% Injectable 12.5 Gram(s) IV Push once  dextrose 50% Injectable 25 Gram(s) IV Push once  enoxaparin Injectable 40 milliGRAM(s) SubCutaneous daily  glucagon  Injectable 1 milliGRAM(s) IntraMuscular once  insulin lispro (ADMELOG) corrective regimen sliding scale   SubCutaneous three times a day before meals  insulin lispro (ADMELOG) corrective regimen sliding scale   SubCutaneous at bedtime  pantoprazole    Tablet 40 milliGRAM(s) Oral before breakfast  polyethylene glycol 3350 17 Gram(s) Oral two times a day  senna 2 Tablet(s) Oral at bedtime    MEDICATIONS  (PRN):  acetaminophen   Tablet .. 975 milliGRAM(s) Oral every 6 hours PRN Mild Pain (1 - 3)  benzocaine 15 mG/menthol 3.6 mG (Sugar-Free) Lozenge 1 Lozenge Oral every 6 hours PRN Sore Throat  bisacodyl 10 milliGRAM(s) Oral daily PRN Constipation    Vital Signs Last 24 Hrs  T(F): 97.4 (25 Aug 2021 08:54), Max: 98.1 (24 Aug 2021 20:47)  HR: 69 (25 Aug 2021 08:54) (69 - 80)  BP: 112/71 (25 Aug 2021 08:54) (92/64 - 112/71)  RR: 15 (25 Aug 2021 08:54) (15 - 16)  SpO2: 99% (25 Aug 2021 08:54) (99% - 99%)  I&O's Summary      PHYSICAL EXAM:  GENERAL: NAD, well-groomed  HEAD:  Atraumatic, Normocephalic  EYES: PEERL, conjunctiva and sclera clear  ENMT: Moist mucous membranes, Good dentition, no thrush  CHEST/LUNG: Clear to auscultation bilaterally, non-labored breathing  HEART: RRR; S1/S2, No murmur  ABDOMEN: Soft, Nontender, Nondistended; Bowel sounds present  EXTREMITIES: No calf tenderness, No cyanosis, No edema  PSYCH: depressed appearing, flat affect  NERVOUS SYSTEM:  A/O x3, Good concentration    LABS:                        13.1   6.98  )-----------( 273      ( 23 Aug 2021 05:59 )             38.9     08-23    140  |  105  |  18  ----------------------------<  138  3.6   |  25  |  0.80    Ca    9.3      23 Aug 2021 05:59    TPro  6.9  /  Alb  3.7  /  TBili  0.5  /  DBili  x   /  AST  22  /  ALT  40  /  AlkPhos  89  08-23    eGFR if Non African American: 87 mL/min/1.73M2 (08-23-21 @ 05:59)  eGFR if African American: 101 mL/min/1.73M2 (08-23-21 @ 05:59)            08-15 Chol 170 mg/dL LDL -- HDL 57 mg/dL Trig 139 mg/dL              POCT Blood Glucose.: 132 mg/dL (25 Aug 2021 07:25)  POCT Blood Glucose.: 126 mg/dL (24 Aug 2021 21:13)  POCT Blood Glucose.: 213 mg/dL (24 Aug 2021 16:29)  POCT Blood Glucose.: 142 mg/dL (24 Aug 2021 11:34)          COVID-19 PCR: NotDetec (08-20-21 @ 05:30)      RADIOLOGY & ADDITIONAL TESTS:    Care Discussed with Consultants/Other Providers: discussed with Dr. Hammer  
Patient is a 50y old  Female who presents with a chief complaint of conversion disorder (20 Aug 2021 11:30)      Patient seen and examined at bedside.  No overnight events  No complaints this morning. FS noted    ALLERGIES:  penicillin (Unknown)    MEDICATIONS  (STANDING):  artificial  tears Solution 1 Drop(s) Both EYES two times a day  aspirin enteric coated 81 milliGRAM(s) Oral daily  atorvastatin 80 milliGRAM(s) Oral at bedtime  cyanocobalamin 1000 MICROGram(s) Oral daily  dextrose 40% Gel 15 Gram(s) Oral once  dextrose 5%. 1000 milliLiter(s) (50 mL/Hr) IV Continuous <Continuous>  dextrose 5%. 1000 milliLiter(s) (100 mL/Hr) IV Continuous <Continuous>  dextrose 50% Injectable 25 Gram(s) IV Push once  dextrose 50% Injectable 12.5 Gram(s) IV Push once  dextrose 50% Injectable 25 Gram(s) IV Push once  enoxaparin Injectable 40 milliGRAM(s) SubCutaneous daily  glucagon  Injectable 1 milliGRAM(s) IntraMuscular once  insulin lispro (ADMELOG) corrective regimen sliding scale   SubCutaneous three times a day before meals  insulin lispro (ADMELOG) corrective regimen sliding scale   SubCutaneous at bedtime  pantoprazole    Tablet 40 milliGRAM(s) Oral before breakfast  polyethylene glycol 3350 17 Gram(s) Oral daily  senna 2 Tablet(s) Oral at bedtime    MEDICATIONS  (PRN):  acetaminophen   Tablet .. 650 milliGRAM(s) Oral every 6 hours PRN Mild Pain (1 - 3)  benzocaine 15 mG/menthol 3.6 mG (Sugar-Free) Lozenge 1 Lozenge Oral every 6 hours PRN Sore Throat    Vital Signs Last 24 Hrs  T(F): 98 (21 Aug 2021 07:30), Max: 98.4 (20 Aug 2021 20:32)  HR: 66 (21 Aug 2021 07:30) (66 - 80)  BP: 106/64 (21 Aug 2021 07:30) (106/64 - 143/88)  RR: 16 (21 Aug 2021 07:30) (16 - 17)  SpO2: 98% (21 Aug 2021 07:30) (98% - 98%)  I&O's Summary    BMI (kg/m2): 30.8 (08-19-21 @ 18:56)    PHYSICAL EXAM:  GENERAL: NAD  HENT:  Atraumatic, Normocephalic; No tonsillar erythema, exudates, or enlargement; Moist mucous membranes;   EYES: EOMI, PERRLA, conjunctiva and sclera clear, no lid-lag  NECK: Supple, No JVD, Normal thyroid  CHEST/LUNG: Clear to percussion bilaterally; No rales, rhonchi, wheezing, or rubs; normal respiratory effort, no intercostal retractions  HEART: Regular rate and rhythm; No murmurs, rubs, or gallops; No pitting edema  ABDOMEN: Soft, Nontender, Nondistended; Bowel sounds present; No HSM  MUSCULOSKELETAL/EXTREMITIES:  2+ Peripheral Pulses, No clubbing or digital cyanosis  PSYCH: Appropriate affect, Alert & Awake    LABS:                        14.3   6.38  )-----------( 299      ( 20 Aug 2021 05:45 )             43.0       08-20    142  |  106  |  10  ----------------------------<  131  3.5   |  26  |  0.79    Ca    9.5      20 Aug 2021 05:45    TPro  7.0  /  Alb  3.8  /  TBili  0.7  /  DBili  x   /  AST  16  /  ALT  36  /  AlkPhos  84  08-20     eGFR if Non African American: 88 mL/min/1.73M2 (08-20-21 @ 05:45)  eGFR if African American: 102 mL/min/1.73M2 (08-20-21 @ 05:45)       08-15 Chol 170 mg/dL LDL -- HDL 57 mg/dL Trig 139 mg/dL      POCT Blood Glucose.: 147 mg/dL (21 Aug 2021 07:54)  POCT Blood Glucose.: 130 mg/dL (20 Aug 2021 21:53)  POCT Blood Glucose.: 129 mg/dL (20 Aug 2021 16:34)  POCT Blood Glucose.: 140 mg/dL (20 Aug 2021 11:49)    Culture - Urine (collected 16 Aug 2021 21:27)  Source: Clean Catch Clean Catch (Midstream)  Final Report (17 Aug 2021 19:43):    <10,000 CFU/mL Normal Urogenital Chantal      COVID-19 PCR: NotDetec (08-20-21 @ 05:30)      Care Discussed with Rehab Attending and Other Providers  
Patient is a 50y old  Female who presents with a chief complaint of conversion disorder (26 Aug 2021 10:26)    Patient seen and examined at bedside. Patient with no acute events overnight, states that she has headache this morning. Patient eating more food now, able to tolerate. Valery chest pain, palpitations, SOB, abd pain. Patient would like to go home today- daughter can pick her up after 6pm.    ALLERGIES:  penicillin (Unknown)    MEDICATIONS  (STANDING):  artificial  tears Solution 1 Drop(s) Both EYES two times a day  aspirin enteric coated 81 milliGRAM(s) Oral daily  atorvastatin 80 milliGRAM(s) Oral at bedtime  cyanocobalamin 1000 MICROGram(s) Oral daily  dextrose 40% Gel 15 Gram(s) Oral once  dextrose 5%. 1000 milliLiter(s) (50 mL/Hr) IV Continuous <Continuous>  dextrose 5%. 1000 milliLiter(s) (100 mL/Hr) IV Continuous <Continuous>  dextrose 50% Injectable 25 Gram(s) IV Push once  dextrose 50% Injectable 12.5 Gram(s) IV Push once  dextrose 50% Injectable 25 Gram(s) IV Push once  enoxaparin Injectable 40 milliGRAM(s) SubCutaneous daily  glucagon  Injectable 1 milliGRAM(s) IntraMuscular once  insulin lispro (ADMELOG) corrective regimen sliding scale   SubCutaneous three times a day before meals  insulin lispro (ADMELOG) corrective regimen sliding scale   SubCutaneous at bedtime  pantoprazole    Tablet 40 milliGRAM(s) Oral before breakfast  polyethylene glycol 3350 17 Gram(s) Oral two times a day  senna 2 Tablet(s) Oral at bedtime    MEDICATIONS  (PRN):  acetaminophen   Tablet .. 975 milliGRAM(s) Oral every 6 hours PRN Mild Pain (1 - 3)  benzocaine 15 mG/menthol 3.6 mG (Sugar-Free) Lozenge 1 Lozenge Oral every 6 hours PRN Sore Throat  bisacodyl 10 milliGRAM(s) Oral daily PRN Constipation    Vital Signs Last 24 Hrs  T(F): 98 (26 Aug 2021 07:40), Max: 98.1 (25 Aug 2021 19:55)  HR: 71 (26 Aug 2021 07:40) (71 - 81)  BP: 120/75 (26 Aug 2021 07:40) (120/75 - 120/87)  RR: 15 (26 Aug 2021 07:40) (15 - 16)  SpO2: 98% (26 Aug 2021 07:40) (98% - 99%)  I&O's Summary    PHYSICAL EXAM:  GENERAL: NAD, well-groomed  HEAD:  Atraumatic, Normocephalic  EYES: PEERL, conjunctiva and sclera clear  ENMT: Moist mucous membranes, Good dentition, no thrush  CHEST/LUNG: Clear to auscultation bilaterally, non-labored breathing  HEART: RRR; S1/S2, No murmur  ABDOMEN: Soft, Nontender, Nondistended; Bowel sounds present  EXTREMITIES: No calf tenderness, No cyanosis, No edema  PSYCH: depressed appearing, flat affect  NERVOUS SYSTEM:  A/O x3, Good concentration    LABS:                        14.6   7.66  )-----------( 263      ( 26 Aug 2021 07:30 )             42.7     08-26    144  |  107  |  14  ----------------------------<  128  4.1   |  25  |  0.78    Ca    9.8      26 Aug 2021 07:30    TPro  7.6  /  Alb  4.2  /  TBili  0.6  /  DBili  x   /  AST  25  /  ALT  41  /  AlkPhos  102  08-26    eGFR if Non African American: 89 mL/min/1.73M2 (08-26-21 @ 07:30)  eGFR if : 103 mL/min/1.73M2 (08-26-21 @ 07:30)            08-15 Chol 170 mg/dL LDL -- HDL 57 mg/dL Trig 139 mg/dL              POCT Blood Glucose.: 140 mg/dL (26 Aug 2021 11:49)  POCT Blood Glucose.: 148 mg/dL (26 Aug 2021 07:43)  POCT Blood Glucose.: 147 mg/dL (25 Aug 2021 20:56)  POCT Blood Glucose.: 126 mg/dL (25 Aug 2021 16:34)          COVID-19 PCR: NotDetec (08-20-21 @ 05:30)      RADIOLOGY & ADDITIONAL TESTS:    Care Discussed with Consultants/Other Providers: discussed with rehab team

## 2021-08-26 NOTE — PROGRESS NOTE ADULT - PROBLEM SELECTOR PROBLEM 2
Conversion disorder
Type 2 diabetes mellitus
Conversion disorder
Type 2 diabetes mellitus
Conversion disorder
Conversion disorder

## 2021-08-26 NOTE — PROGRESS NOTE ADULT - PROBLEM SELECTOR PROBLEM 4
ÓSCAR (acute kidney injury)
Vitamin B12 deficiency
Vitamin B12 deficiency
ÓSCAR (acute kidney injury)

## 2021-08-26 NOTE — PROGRESS NOTE ADULT - PROBLEM SELECTOR PLAN 4
- resolved  - encourage oral intake
- b12 451 (8/14)  - continue b 12 supplements.
- resolved  - encourage oral intake
- b12 451 (8/14)  - continue b 12 supplements.

## 2021-08-26 NOTE — PROGRESS NOTE ADULT - PROBLEM SELECTOR PLAN 1
Possible conversion disorder documented  Impaired gait, mobility, ADL  - Comprehensive rehab program of PT/OT  - Fall precautions  - lipid profile (170/139/57/85) 8/15  - continue ASA and statin  - Bowel regimen as per primary team  - Pain meds as per primary team, Tylenol dose increased  - diet advanced to regular, tolerating well
Possible conversion disorder documented  Impaired gait, mobility, ADL  - Comprehensive rehab program of PT/OT  - Fall precautions  - lipid profile (170/139/57/85) 8/15  - continue ASA and statin  - Bowel regimen as per primary team  - Pain meds as per primary team.
Possible conversion disorder documented  Impaired gait, mobility, ADL  - Comprehensive rehab program of PT/OT  - Fall precautions  - lipid profile (170/139/57/85) 8/15  - continue ASA and statin  - Bowel regimen as per primary team  - Pain meds as per primary team, Tylenol dose increased  - diet advanced to regular
Possible conversion disorder documented  Impaired gait, mobility, ADL  - Comprehensive rehab program of PT/OT  - Fall precautions  - lipid profile (170/139/57/85) 8/15  - continue ASA and statin  - Bowel regimen as per primary team  - Pain meds as per primary team  - diet to be advanced as per primary to regular diet

## 2021-08-26 NOTE — PROGRESS NOTE ADULT - PROBLEM SELECTOR PLAN 6
- continue lovenox.  - dc planning for home today
- continue lovenox.

## 2021-08-26 NOTE — DISCHARGE NOTE NURSING/CASE MANAGEMENT/SOCIAL WORK - PATIENT PORTAL LINK FT
You can access the FollowMyHealth Patient Portal offered by [facility] by registering at the following website: [email or web address]. By joining [facility]’s FollowMyHealth portal, you will also be able to view your health information using other applications (apps) compatible with our system.

## 2021-08-26 NOTE — PROGRESS NOTE ADULT - PROBLEM SELECTOR PROBLEM 5
Vitamin B12 deficiency
DVT prophylaxis
Vitamin B12 deficiency
DVT prophylaxis
Vitamin B12 deficiency
Vitamin B12 deficiency

## 2021-08-26 NOTE — PROGRESS NOTE ADULT - NUTRITIONAL ASSESSMENT
This patient has been assessed with a concern for Malnutrition and has been determined to have a diagnosis/diagnoses of Moderate protein-calorie malnutrition.    This patient is being managed with:   Diet Dysphagia 3 Soft-Thin Liquids-  Consistent Carbohydrate {Evening Snack}  Lacto-Ovo Veg (Accepts Milk Prod. Eggs)  Entered: Aug 20 2021 12:11PM    
This patient has been assessed with a concern for Malnutrition and has been determined to have a diagnosis/diagnoses of Moderate protein-calorie malnutrition.    This patient is being managed with:   Diet Consistent Carbohydrate w/Evening Snack-  Lacto-Ovo Veg (Accepts Milk Prod. Eggs)  Entered: Aug 24 2021  9:52AM    
This patient has been assessed with a concern for Malnutrition and has been determined to have a diagnosis/diagnoses of Moderate protein-calorie malnutrition.    This patient is being managed with:   Diet Dysphagia 3 Soft-Thin Liquids-  Consistent Carbohydrate {Evening Snack}  Lacto-Ovo Veg (Accepts Milk Prod. Eggs)  Entered: Aug 20 2021 12:11PM    

## 2021-08-26 NOTE — PROGRESS NOTE ADULT - PROBLEM SELECTOR PLAN 2
- a1c 7.2 (8/14/21)  - patient does not want metformin as she was on it before and was not helpful  - continue ISS and monitor FS.
- a1c 7.2 (8/14/21)  - patient does not want metformin as she was on it before and was not helpful  - continue ISS and monitor FS.
- ?conversion disorder vs depression  - psych note appreciated, recommend outpatient psychotherapy vs CBT
- ?conversion disorder vs depression  - psych to see patient today, follow up recs
- ?conversion disorder vs depression  - psych note appreciated, recommend outpatient psychotherapy vs CBT
- ?conversion disorder vs depression  - psych note appreciated, recommend outpatient psychotherapy vs CBT

## 2021-08-26 NOTE — PROGRESS NOTE ADULT - PROBLEM SELECTOR PLAN 5
- b12 451 (8/14)  - continue b 12 supplements.
- continue lovenox.
- continue lovenox.
- b12 451 (8/14)  - continue b 12 supplements.

## 2021-08-26 NOTE — PROGRESS NOTE ADULT - REASON FOR ADMISSION
conversion disorder

## 2021-08-26 NOTE — DISCHARGE NOTE NURSING/CASE MANAGEMENT/SOCIAL WORK - NSDCPEFALRISK_GEN_ALL_CORE
For information on Fall & injury Prevention, visit [email or web address]

## 2021-08-26 NOTE — PROGRESS NOTE ADULT - PROBLEM SELECTOR PROBLEM 3
Type 2 diabetes mellitus
Type 2 diabetes mellitus
ÓSCAR (acute kidney injury)
Type 2 diabetes mellitus
Type 2 diabetes mellitus
ÓSCAR (acute kidney injury)

## 2021-08-26 NOTE — PROGRESS NOTE ADULT - PROVIDER SPECIALTY LIST ADULT
Physiatry
Hospitalist
Physiatry
Hospitalist

## 2021-08-26 NOTE — PROGRESS NOTE ADULT - PROBLEM SELECTOR PROBLEM 1
Inability to walk

## 2021-08-26 NOTE — PROGRESS NOTE ADULT - PROBLEM SELECTOR PLAN 3
- a1c 7.2 (8/14/21)  - patient does not want metformin as she was on it before and was not helpful  - continue ISS and monitor FS.
- eGFR decrease to 80s. It was >100 abt 3 days before coming to rehab  - encourage oral intake.
- eGFR decrease to 80s. It was >100 abt 3 days before coming to rehab  - encourage oral intake.
- a1c 7.2 (8/14/21)  - patient does not want metformin as she was on it before and was not helpful  - continue ISS and monitor FS.

## 2021-08-26 NOTE — DISCHARGE NOTE NURSING/CASE MANAGEMENT/SOCIAL WORK - NSDCFUADDAPPT_GEN_ALL_CORE_FT
Call to make outpatient appointment with Neurology at [phone] for further evaluation.    Follow up with your PCP within 1 week.     If you would like to follow up with Dr Hammer. Call the office to make an appointment for 4 to 6 weeks out from now. [phone].    If you have any questions or concerns, please feel free to contact Sue Farmer NP at [phone] or [phone].

## 2023-01-01 NOTE — ED ADULT NURSE NOTE - LANGUAGE ASSISTANCE NEEDED
infant born @1558 C/S. AROM 1557 mec. wt 3185g. apgar 9/9. No-Patient/Caregiver offered and refused free interpretation services.

## 2023-03-07 ENCOUNTER — INPATIENT (INPATIENT)
Facility: HOSPITAL | Age: 53
LOS: 1 days | Discharge: HOME | End: 2023-03-09
Attending: INTERNAL MEDICINE | Admitting: INTERNAL MEDICINE
Payer: COMMERCIAL

## 2023-03-07 VITALS
HEART RATE: 83 BPM | RESPIRATION RATE: 18 BRPM | DIASTOLIC BLOOD PRESSURE: 93 MMHG | SYSTOLIC BLOOD PRESSURE: 138 MMHG | TEMPERATURE: 98 F | OXYGEN SATURATION: 99 %

## 2023-03-07 LAB
ALBUMIN SERPL ELPH-MCNC: 5.1 G/DL — HIGH (ref 3.3–5)
ALP SERPL-CCNC: 104 U/L — SIGNIFICANT CHANGE UP (ref 40–120)
ALT FLD-CCNC: 27 U/L — SIGNIFICANT CHANGE UP (ref 4–33)
ANION GAP SERPL CALC-SCNC: 13 MMOL/L — SIGNIFICANT CHANGE UP (ref 7–14)
APPEARANCE UR: CLEAR — SIGNIFICANT CHANGE UP
AST SERPL-CCNC: 24 U/L — SIGNIFICANT CHANGE UP (ref 4–32)
BACTERIA # UR AUTO: NEGATIVE — SIGNIFICANT CHANGE UP
BASOPHILS # BLD AUTO: 0.07 K/UL — SIGNIFICANT CHANGE UP (ref 0–0.2)
BASOPHILS NFR BLD AUTO: 0.9 % — SIGNIFICANT CHANGE UP (ref 0–2)
BILIRUB SERPL-MCNC: 0.6 MG/DL — SIGNIFICANT CHANGE UP (ref 0.2–1.2)
BILIRUB UR-MCNC: NEGATIVE — SIGNIFICANT CHANGE UP
BUN SERPL-MCNC: 10 MG/DL — SIGNIFICANT CHANGE UP (ref 7–23)
CALCIUM SERPL-MCNC: 9.9 MG/DL — SIGNIFICANT CHANGE UP (ref 8.4–10.5)
CHLORIDE SERPL-SCNC: 105 MMOL/L — SIGNIFICANT CHANGE UP (ref 98–107)
CO2 SERPL-SCNC: 26 MMOL/L — SIGNIFICANT CHANGE UP (ref 22–31)
COLOR SPEC: YELLOW — SIGNIFICANT CHANGE UP
CREAT SERPL-MCNC: 0.78 MG/DL — SIGNIFICANT CHANGE UP (ref 0.5–1.3)
DIFF PNL FLD: NEGATIVE — SIGNIFICANT CHANGE UP
EGFR: 92 ML/MIN/1.73M2 — SIGNIFICANT CHANGE UP
EOSINOPHIL # BLD AUTO: 0.07 K/UL — SIGNIFICANT CHANGE UP (ref 0–0.5)
EOSINOPHIL NFR BLD AUTO: 0.9 % — SIGNIFICANT CHANGE UP (ref 0–6)
EPI CELLS # UR: 5 /HPF — SIGNIFICANT CHANGE UP (ref 0–5)
FLUAV AG NPH QL: SIGNIFICANT CHANGE UP
FLUBV AG NPH QL: SIGNIFICANT CHANGE UP
GLUCOSE SERPL-MCNC: 107 MG/DL — HIGH (ref 70–99)
GLUCOSE UR QL: ABNORMAL
HCG SERPL-ACNC: <5 MIU/ML — SIGNIFICANT CHANGE UP
HCT VFR BLD CALC: 45.9 % — HIGH (ref 34.5–45)
HGB BLD-MCNC: 14.5 G/DL — SIGNIFICANT CHANGE UP (ref 11.5–15.5)
HYALINE CASTS # UR AUTO: 3 /LPF — SIGNIFICANT CHANGE UP (ref 0–7)
IANC: 2.78 K/UL — SIGNIFICANT CHANGE UP (ref 1.8–7.4)
IMM GRANULOCYTES NFR BLD AUTO: 0.5 % — SIGNIFICANT CHANGE UP (ref 0–0.9)
KETONES UR-MCNC: NEGATIVE — SIGNIFICANT CHANGE UP
LEUKOCYTE ESTERASE UR-ACNC: ABNORMAL
LYMPHOCYTES # BLD AUTO: 4.57 K/UL — HIGH (ref 1–3.3)
LYMPHOCYTES # BLD AUTO: 57.2 % — HIGH (ref 13–44)
MCHC RBC-ENTMCNC: 28.7 PG — SIGNIFICANT CHANGE UP (ref 27–34)
MCHC RBC-ENTMCNC: 31.6 GM/DL — LOW (ref 32–36)
MCV RBC AUTO: 90.7 FL — SIGNIFICANT CHANGE UP (ref 80–100)
MONOCYTES # BLD AUTO: 0.46 K/UL — SIGNIFICANT CHANGE UP (ref 0–0.9)
MONOCYTES NFR BLD AUTO: 5.8 % — SIGNIFICANT CHANGE UP (ref 2–14)
NEUTROPHILS # BLD AUTO: 2.78 K/UL — SIGNIFICANT CHANGE UP (ref 1.8–7.4)
NEUTROPHILS NFR BLD AUTO: 34.7 % — LOW (ref 43–77)
NITRITE UR-MCNC: POSITIVE
NRBC # BLD: 0 /100 WBCS — SIGNIFICANT CHANGE UP (ref 0–0)
NRBC # FLD: 0 K/UL — SIGNIFICANT CHANGE UP (ref 0–0)
PH UR: 6.5 — SIGNIFICANT CHANGE UP (ref 5–8)
PLATELET # BLD AUTO: 338 K/UL — SIGNIFICANT CHANGE UP (ref 150–400)
POTASSIUM SERPL-MCNC: 3.9 MMOL/L — SIGNIFICANT CHANGE UP (ref 3.5–5.3)
POTASSIUM SERPL-SCNC: 3.9 MMOL/L — SIGNIFICANT CHANGE UP (ref 3.5–5.3)
PROT SERPL-MCNC: 8.3 G/DL — SIGNIFICANT CHANGE UP (ref 6–8.3)
PROT UR-MCNC: ABNORMAL
RBC # BLD: 5.06 M/UL — SIGNIFICANT CHANGE UP (ref 3.8–5.2)
RBC # FLD: 14.2 % — SIGNIFICANT CHANGE UP (ref 10.3–14.5)
RBC CASTS # UR COMP ASSIST: 6 /HPF — HIGH (ref 0–4)
RSV RNA NPH QL NAA+NON-PROBE: SIGNIFICANT CHANGE UP
SARS-COV-2 RNA SPEC QL NAA+PROBE: SIGNIFICANT CHANGE UP
SODIUM SERPL-SCNC: 144 MMOL/L — SIGNIFICANT CHANGE UP (ref 135–145)
SP GR SPEC: 1.02 — SIGNIFICANT CHANGE UP (ref 1.01–1.05)
TROPONIN T, HIGH SENSITIVITY RESULT: <6 NG/L — SIGNIFICANT CHANGE UP
UROBILINOGEN FLD QL: SIGNIFICANT CHANGE UP
WBC # BLD: 7.99 K/UL — SIGNIFICANT CHANGE UP (ref 3.8–10.5)
WBC # FLD AUTO: 7.99 K/UL — SIGNIFICANT CHANGE UP (ref 3.8–10.5)
WBC UR QL: 56 /HPF — HIGH (ref 0–5)

## 2023-03-07 PROCEDURE — 99285 EMERGENCY DEPT VISIT HI MDM: CPT

## 2023-03-07 PROCEDURE — 71045 X-RAY EXAM CHEST 1 VIEW: CPT | Mod: 26

## 2023-03-07 PROCEDURE — 71275 CT ANGIOGRAPHY CHEST: CPT | Mod: 26,MA

## 2023-03-07 PROCEDURE — 70450 CT HEAD/BRAIN W/O DYE: CPT | Mod: 26,MA

## 2023-03-07 PROCEDURE — 74174 CTA ABD&PLVS W/CONTRAST: CPT | Mod: 26,MA

## 2023-03-07 RX ORDER — SODIUM CHLORIDE 9 MG/ML
1000 INJECTION INTRAMUSCULAR; INTRAVENOUS; SUBCUTANEOUS ONCE
Refills: 0 | Status: COMPLETED | OUTPATIENT
Start: 2023-03-07 | End: 2023-03-07

## 2023-03-07 RX ORDER — CEFTRIAXONE 500 MG/1
1000 INJECTION, POWDER, FOR SOLUTION INTRAMUSCULAR; INTRAVENOUS ONCE
Refills: 0 | Status: COMPLETED | OUTPATIENT
Start: 2023-03-07 | End: 2023-03-07

## 2023-03-07 RX ADMIN — CEFTRIAXONE 100 MILLIGRAM(S): 500 INJECTION, POWDER, FOR SOLUTION INTRAMUSCULAR; INTRAVENOUS at 20:26

## 2023-03-07 RX ADMIN — SODIUM CHLORIDE 1000 MILLILITER(S): 9 INJECTION INTRAMUSCULAR; INTRAVENOUS; SUBCUTANEOUS at 19:28

## 2023-03-07 NOTE — ED PROVIDER NOTE - NS ED ATTENDING STATEMENT MOD
This was a shared visit with the MODE. I reviewed and verified the documentation and independently performed the documented:

## 2023-03-07 NOTE — ED ADULT NURSE NOTE - OBJECTIVE STATEMENT
Patient was at work and had a syncopal episode. Pt also c/o chest pain. Pt was seen by EMS and blood pressure was elevated. Pt was fasting today. Type 2 DM: FS: 118.  PT A&oX4.  No distress noted.  Breathing non-labored.  Skin intact.  PT ambulatory, able to move all extremities.  Left IVL 20G in place and tolerating well.  Labs sent.  Cardiac monitor in place.  Will continue to monitor.

## 2023-03-07 NOTE — ED PROVIDER NOTE - CLINICAL SUMMARY MEDICAL DECISION MAKING FREE TEXT BOX
53 yo F with PMH of DM T2, HLD, TIA, presents to ED c/o witnessed syncope while praying today around 1PM. 51 yo F with PMH of DM T2, HLD, TIA on ASA, presents to ED c/o witnessed syncope while praying today around 1PM, chest pain yesterday and today after syncope. Pt with generalized weakness. Vital signs stable. . EKG NSR. Neuro exam with poor effort. low suspicion for CVA. Given syncope with chest pain a/w elevated blood pressure and numbness of b/l hands and b/l soles, r/o aortic dissection. Plan: labs, trop, CXR, CTA chest/abd/pelvic, CT head noncontrast to r/o acute intracranial pathology, and reassess, likely admit for telemetry, and further workup.

## 2023-03-07 NOTE — ED PROVIDER NOTE - PELVIS
"Chief Complaint   Patient presents with   • Abscess     Notice 2-3 days ago. On groin, popped last night, clear fluid per patient.        HPI:  Patient is a 71 y.o. male established patient who presents today for evaluation of new skin boil, present in pubic area under abdominal pannus, for the past 2-3 days. Pt reports that he has had a chronic red skin changes at that site previously evaluated by his dermatologist. 2-3 days ago he noticed a large bump with associated pain and skin redness. He contacted me via [email or web address] yesterday, and I recommended office visit with me for proper evaluation. He reports that area opened last night and serous fluid has been draining intermittently. He denies associated N/V/D/F/bowel or bladder changes and has no new health changes to report. He is currently recovering from cold like symptoms.     Patient Active Problem List    Diagnosis Date Noted   • BMI 28.0-28.9,adult 10/01/2018   • Obstructive sleep apnea 03/16/2018   • Macrocytosis 03/09/2018   • Vitamin D deficiency 03/09/2018   • Pulmonary emphysema (HCC) 12/07/2017   • Chronic obstructive pulmonary disease (HCC) 12/07/2017   • Essential hypertension 03/08/2017   • LUCIA (generalized anxiety disorder) 03/08/2017   • Mixed simple and mucopurulent chronic bronchitis (HCC) 09/28/2016   • Pure hypercholesterolemia 09/28/2016   • History of tobacco use 09/28/2016   • H/O melanoma excision 09/28/2016   • Urinary hesitancy due to benign prostatic hyperplasia 09/28/2016       Past medical, surgical, family, and social history was reviewed in Epic chart by me today.     Medications and allergies reviewed and updated in Epic chart by me today.     ROS:  Pertinent positives listed above in HPI. All other systems have been reviewed and are negative.    PE:   /70 (BP Location: Left arm, Patient Position: Sitting, BP Cuff Size: Adult)   Pulse 92   Temp 37.1 °C (98.7 °F) (Temporal)   Resp 16   Ht 1.753 m (5' 9\")   Wt 87.1 kg (192 lb)  "  SpO2 93%   BMI 28.35 kg/m²   Vital signs reviewed with patient.     Gen: Well developed; well nourished; no acute distress; age appropriate appearance   Pelvic region: small indurated skin boil noted in midline under abdominal pannus - skin is red and active serosanguineous drainage present. I was able to apply gentle pressure around lesion and express copious white matter from lesion. I terminated process due to pain and general discomfort expressed by patient. No skin streaking present and no vasc  Extremities: No peripheral edema b/l LE extremities/ no clubbing nor cyanosis noted  Skin: Warm and dry; no rashes noted   Neuro: No focal deficits noted   Psych: AAOx4; mood and affect are appropriate    A/P:  1. Boil  Patient has infected skin boil that is actively draining at this time without associated acute systemic illness. Recommend starting Bactrim today, apply warm compresses to area often to encourage further drainage, keep area clean and otherwise dry, and follow clinical response. Pt educated about signs and symptoms that would warrant immediate medical attention. New RX sent to pharmacy in [place] today for him.   - sulfamethoxazole-trimethoprim (BACTRIM DS) 800-160 MG tablet; Take 1 Tab by mouth 2 times a day for 7 days.  Dispense: 14 Tab; Refill: 1    Pt is to contact me if condition does not resolve with treatments plans detailed above.   Face to face time: 25 minutes spent with greater than 50% of time spent with direct patient care and counseling about diagnosis, prognosis, risk versus benefits of treatment, and importance of compliance with instructions. All questions answered and support provided during visit today.           stable

## 2023-03-07 NOTE — ED ADULT TRIAGE NOTE - CHIEF COMPLAINT QUOTE
Patient was at work and had a syncopal episode. Pt also c/o chest pain. Pt was seen by EMS and blood pressure was elevated. Pt was fasting today. Type 2 DM: FS: 118

## 2023-03-07 NOTE — ED PROVIDER NOTE - OBJECTIVE STATEMENT
51 yo F with PMH of DM T2, HLD, TIA, presents to ED c/o witnessed syncope while praying today around 1PM. Pt states she was sitting down to pray, felt dizzy after 4-5 minutes, off balance and passed out. As per friend, in the same room, witnessed patient sitting, unconscious, not responding for 3-4 minutes, body felt stiff. Admits called EMS, sugar level was 120 and elevated blood pressure to 194. Reports she has midsternal chest pain after the syncope, sharp, intermittent, non-radiating. Admits similar sharp chest pain yesterday but went away. Admits having headache now. Admits numbness of both hands and bottom of both feet. Admits nausea. Reports she was fasting, only had banana and coffee around 4-5am. Pt denies any associated symptoms, including f/c/v, palpitation, diaphoresis, cough, sob, weakness, any recent trauma or injury to chest, lower extremity edema. Denies any prior history of DVT/PE, hx of malignancy or known hypercoagulable state. Admits mother  of MI and father with stroke x2.

## 2023-03-07 NOTE — ED PROVIDER NOTE - ATTENDING APP SHARED VISIT CONTRIBUTION OF CARE
51 yo F hx DM2, HLD, B12 deficiency with neuropathy, migraines, ? conversion syndrome, presenting with c/o dizziness that started while praying. Pt felt as though the room was moving after sitting up from bending down. She reports feeling these symptoms in the past. Her friend who was with her noted that she was sitting up, stiff, and not responding for a few minutes during episode. No tongue biting, urinary incontinence or full body shaking. Denies gait instability or feeling like room is spinning right now. No fevers/chills, sob, cough. Pt endorses sharp cp x 2 days, no associated nv, and states that cp worsened after event today. Pt reports tingling to b/l feet and hands. Poor effort b/l UE and LE on exam but improves with redirection. No pronator drift, FTN normal, coordination intact. Given CP with numbness, will obtain CTA r/o aortic dissection, though likely 2/2 known neuropathy, Likely CDU for cardiac work up./

## 2023-03-08 DIAGNOSIS — R55 SYNCOPE AND COLLAPSE: ICD-10-CM

## 2023-03-08 DIAGNOSIS — K27.9 PEPTIC ULCER, SITE UNSPECIFIED, UNSPECIFIED AS ACUTE OR CHRONIC, WITHOUT HEMORRHAGE OR PERFORATION: ICD-10-CM

## 2023-03-08 DIAGNOSIS — N39.0 URINARY TRACT INFECTION, SITE NOT SPECIFIED: ICD-10-CM

## 2023-03-08 DIAGNOSIS — N28.89 OTHER SPECIFIED DISORDERS OF KIDNEY AND URETER: ICD-10-CM

## 2023-03-08 DIAGNOSIS — R63.8 OTHER SYMPTOMS AND SIGNS CONCERNING FOOD AND FLUID INTAKE: ICD-10-CM

## 2023-03-08 DIAGNOSIS — E11.9 TYPE 2 DIABETES MELLITUS WITHOUT COMPLICATIONS: ICD-10-CM

## 2023-03-08 DIAGNOSIS — Z86.73 PERSONAL HISTORY OF TRANSIENT ISCHEMIC ATTACK (TIA), AND CEREBRAL INFARCTION WITHOUT RESIDUAL DEFICITS: ICD-10-CM

## 2023-03-08 LAB
A1C WITH ESTIMATED AVERAGE GLUCOSE RESULT: 6.7 % — HIGH (ref 4–5.6)
ALBUMIN SERPL ELPH-MCNC: 4.2 G/DL — SIGNIFICANT CHANGE UP (ref 3.3–5)
ALP SERPL-CCNC: 85 U/L — SIGNIFICANT CHANGE UP (ref 40–120)
ALT FLD-CCNC: 26 U/L — SIGNIFICANT CHANGE UP (ref 4–33)
ANION GAP SERPL CALC-SCNC: 15 MMOL/L — HIGH (ref 7–14)
AST SERPL-CCNC: 21 U/L — SIGNIFICANT CHANGE UP (ref 4–32)
BASOPHILS # BLD AUTO: 0.05 K/UL — SIGNIFICANT CHANGE UP (ref 0–0.2)
BASOPHILS NFR BLD AUTO: 0.9 % — SIGNIFICANT CHANGE UP (ref 0–2)
BILIRUB SERPL-MCNC: 0.6 MG/DL — SIGNIFICANT CHANGE UP (ref 0.2–1.2)
BUN SERPL-MCNC: 12 MG/DL — SIGNIFICANT CHANGE UP (ref 7–23)
CALCIUM SERPL-MCNC: 9.3 MG/DL — SIGNIFICANT CHANGE UP (ref 8.4–10.5)
CHLORIDE SERPL-SCNC: 107 MMOL/L — SIGNIFICANT CHANGE UP (ref 98–107)
CO2 SERPL-SCNC: 21 MMOL/L — LOW (ref 22–31)
CREAT SERPL-MCNC: 0.78 MG/DL — SIGNIFICANT CHANGE UP (ref 0.5–1.3)
EGFR: 92 ML/MIN/1.73M2 — SIGNIFICANT CHANGE UP
EOSINOPHIL # BLD AUTO: 0.11 K/UL — SIGNIFICANT CHANGE UP (ref 0–0.5)
EOSINOPHIL NFR BLD AUTO: 1.9 % — SIGNIFICANT CHANGE UP (ref 0–6)
ESTIMATED AVERAGE GLUCOSE: 146 — SIGNIFICANT CHANGE UP
GLUCOSE BLDC GLUCOMTR-MCNC: 103 MG/DL — HIGH (ref 70–99)
GLUCOSE BLDC GLUCOMTR-MCNC: 121 MG/DL — HIGH (ref 70–99)
GLUCOSE BLDC GLUCOMTR-MCNC: 122 MG/DL — HIGH (ref 70–99)
GLUCOSE BLDC GLUCOMTR-MCNC: 198 MG/DL — HIGH (ref 70–99)
GLUCOSE BLDC GLUCOMTR-MCNC: 93 MG/DL — SIGNIFICANT CHANGE UP (ref 70–99)
GLUCOSE SERPL-MCNC: 142 MG/DL — HIGH (ref 70–99)
HCT VFR BLD CALC: 42.5 % — SIGNIFICANT CHANGE UP (ref 34.5–45)
HGB BLD-MCNC: 13.2 G/DL — SIGNIFICANT CHANGE UP (ref 11.5–15.5)
IANC: 2.1 K/UL — SIGNIFICANT CHANGE UP (ref 1.8–7.4)
IMM GRANULOCYTES NFR BLD AUTO: 0.5 % — SIGNIFICANT CHANGE UP (ref 0–0.9)
LYMPHOCYTES # BLD AUTO: 2.94 K/UL — SIGNIFICANT CHANGE UP (ref 1–3.3)
LYMPHOCYTES # BLD AUTO: 51.2 % — HIGH (ref 13–44)
MAGNESIUM SERPL-MCNC: 2.3 MG/DL — SIGNIFICANT CHANGE UP (ref 1.6–2.6)
MCHC RBC-ENTMCNC: 28.1 PG — SIGNIFICANT CHANGE UP (ref 27–34)
MCHC RBC-ENTMCNC: 31.1 GM/DL — LOW (ref 32–36)
MCV RBC AUTO: 90.6 FL — SIGNIFICANT CHANGE UP (ref 80–100)
MONOCYTES # BLD AUTO: 0.51 K/UL — SIGNIFICANT CHANGE UP (ref 0–0.9)
MONOCYTES NFR BLD AUTO: 8.9 % — SIGNIFICANT CHANGE UP (ref 2–14)
NEUTROPHILS # BLD AUTO: 2.1 K/UL — SIGNIFICANT CHANGE UP (ref 1.8–7.4)
NEUTROPHILS NFR BLD AUTO: 36.6 % — LOW (ref 43–77)
NRBC # BLD: 0 /100 WBCS — SIGNIFICANT CHANGE UP (ref 0–0)
NRBC # FLD: 0 K/UL — SIGNIFICANT CHANGE UP (ref 0–0)
PHOSPHATE SERPL-MCNC: 4 MG/DL — SIGNIFICANT CHANGE UP (ref 2.5–4.5)
PLATELET # BLD AUTO: 302 K/UL — SIGNIFICANT CHANGE UP (ref 150–400)
POTASSIUM SERPL-MCNC: 3.6 MMOL/L — SIGNIFICANT CHANGE UP (ref 3.5–5.3)
POTASSIUM SERPL-SCNC: 3.6 MMOL/L — SIGNIFICANT CHANGE UP (ref 3.5–5.3)
PROT SERPL-MCNC: 6.6 G/DL — SIGNIFICANT CHANGE UP (ref 6–8.3)
RBC # BLD: 4.69 M/UL — SIGNIFICANT CHANGE UP (ref 3.8–5.2)
RBC # FLD: 14.2 % — SIGNIFICANT CHANGE UP (ref 10.3–14.5)
SODIUM SERPL-SCNC: 143 MMOL/L — SIGNIFICANT CHANGE UP (ref 135–145)
TSH SERPL-MCNC: 0.73 UIU/ML — SIGNIFICANT CHANGE UP (ref 0.27–4.2)
WBC # BLD: 5.74 K/UL — SIGNIFICANT CHANGE UP (ref 3.8–10.5)
WBC # FLD AUTO: 5.74 K/UL — SIGNIFICANT CHANGE UP (ref 3.8–10.5)

## 2023-03-08 PROCEDURE — 99223 1ST HOSP IP/OBS HIGH 75: CPT

## 2023-03-08 RX ORDER — DEXTROSE 50 % IN WATER 50 %
25 SYRINGE (ML) INTRAVENOUS ONCE
Refills: 0 | Status: DISCONTINUED | OUTPATIENT
Start: 2023-03-08 | End: 2023-03-09

## 2023-03-08 RX ORDER — SODIUM CHLORIDE 9 MG/ML
1000 INJECTION, SOLUTION INTRAVENOUS
Refills: 0 | Status: DISCONTINUED | OUTPATIENT
Start: 2023-03-08 | End: 2023-03-09

## 2023-03-08 RX ORDER — INSULIN LISPRO 100/ML
VIAL (ML) SUBCUTANEOUS
Refills: 0 | Status: DISCONTINUED | OUTPATIENT
Start: 2023-03-08 | End: 2023-03-09

## 2023-03-08 RX ORDER — FAMOTIDINE 10 MG/ML
20 INJECTION INTRAVENOUS DAILY
Refills: 0 | Status: DISCONTINUED | OUTPATIENT
Start: 2023-03-08 | End: 2023-03-09

## 2023-03-08 RX ORDER — INFLUENZA VIRUS VACCINE 15; 15; 15; 15 UG/.5ML; UG/.5ML; UG/.5ML; UG/.5ML
0.5 SUSPENSION INTRAMUSCULAR ONCE
Refills: 0 | Status: DISCONTINUED | OUTPATIENT
Start: 2023-03-08 | End: 2023-03-09

## 2023-03-08 RX ORDER — ASPIRIN/CALCIUM CARB/MAGNESIUM 324 MG
81 TABLET ORAL DAILY
Refills: 0 | Status: DISCONTINUED | OUTPATIENT
Start: 2023-03-08 | End: 2023-03-09

## 2023-03-08 RX ORDER — INSULIN LISPRO 100/ML
VIAL (ML) SUBCUTANEOUS AT BEDTIME
Refills: 0 | Status: DISCONTINUED | OUTPATIENT
Start: 2023-03-08 | End: 2023-03-09

## 2023-03-08 RX ORDER — DEXTROSE 50 % IN WATER 50 %
15 SYRINGE (ML) INTRAVENOUS ONCE
Refills: 0 | Status: DISCONTINUED | OUTPATIENT
Start: 2023-03-08 | End: 2023-03-09

## 2023-03-08 RX ORDER — GABAPENTIN 400 MG/1
300 CAPSULE ORAL DAILY
Refills: 0 | Status: DISCONTINUED | OUTPATIENT
Start: 2023-03-08 | End: 2023-03-09

## 2023-03-08 RX ORDER — ACETAMINOPHEN 500 MG
650 TABLET ORAL EVERY 6 HOURS
Refills: 0 | Status: DISCONTINUED | OUTPATIENT
Start: 2023-03-08 | End: 2023-03-09

## 2023-03-08 RX ORDER — EMPAGLIFLOZIN, METFORMIN HYDROCHLORIDE 10; 1000 MG/1; MG/1
1 TABLET, EXTENDED RELEASE ORAL
Qty: 0 | Refills: 0 | DISCHARGE

## 2023-03-08 RX ORDER — ENOXAPARIN SODIUM 100 MG/ML
40 INJECTION SUBCUTANEOUS EVERY 24 HOURS
Refills: 0 | Status: DISCONTINUED | OUTPATIENT
Start: 2023-03-08 | End: 2023-03-09

## 2023-03-08 RX ORDER — ATORVASTATIN CALCIUM 80 MG/1
20 TABLET, FILM COATED ORAL AT BEDTIME
Refills: 0 | Status: DISCONTINUED | OUTPATIENT
Start: 2023-03-08 | End: 2023-03-09

## 2023-03-08 RX ORDER — GLUCAGON INJECTION, SOLUTION 0.5 MG/.1ML
1 INJECTION, SOLUTION SUBCUTANEOUS ONCE
Refills: 0 | Status: DISCONTINUED | OUTPATIENT
Start: 2023-03-08 | End: 2023-03-09

## 2023-03-08 RX ORDER — DEXTROSE 50 % IN WATER 50 %
12.5 SYRINGE (ML) INTRAVENOUS ONCE
Refills: 0 | Status: DISCONTINUED | OUTPATIENT
Start: 2023-03-08 | End: 2023-03-09

## 2023-03-08 RX ORDER — CEFTRIAXONE 500 MG/1
1000 INJECTION, POWDER, FOR SOLUTION INTRAMUSCULAR; INTRAVENOUS EVERY 24 HOURS
Refills: 0 | Status: DISCONTINUED | OUTPATIENT
Start: 2023-03-08 | End: 2023-03-09

## 2023-03-08 RX ADMIN — GABAPENTIN 300 MILLIGRAM(S): 400 CAPSULE ORAL at 12:38

## 2023-03-08 RX ADMIN — FAMOTIDINE 20 MILLIGRAM(S): 10 INJECTION INTRAVENOUS at 12:38

## 2023-03-08 RX ADMIN — CEFTRIAXONE 100 MILLIGRAM(S): 500 INJECTION, POWDER, FOR SOLUTION INTRAMUSCULAR; INTRAVENOUS at 21:43

## 2023-03-08 RX ADMIN — ATORVASTATIN CALCIUM 20 MILLIGRAM(S): 80 TABLET, FILM COATED ORAL at 21:43

## 2023-03-08 RX ADMIN — Medication 81 MILLIGRAM(S): at 12:38

## 2023-03-08 NOTE — H&P ADULT - PROBLEM SELECTOR PLAN 7
F-s/p 1 L NS in ED  E-replete prn  N-DASH/TLC Halal diet  lovenox DVT prophylaxis, if melena develops d/c

## 2023-03-08 NOTE — ED ADULT NURSE REASSESSMENT NOTE - NS ED NURSE REASSESS COMMENT FT1
[facility] pt resting comfortably in stretcher, respirations are even and unlabored. NSR on cardiac monitor. Pt endorsing no complaints at this time. Pt to be transported

## 2023-03-08 NOTE — H&P ADULT - ASSESSMENT
Patient is a 52 year old F hx DM2, HLD, TIA, peptic ulcer disease, known right renal lesion, who presents w/ syncope.

## 2023-03-08 NOTE — CONSULT NOTE ADULT - SUBJECTIVE AND OBJECTIVE BOX
Cardiovascular Disease Initial Evaluation  Date of service: 03-08-23 @ 13:39     CHIEF COMPLAINT:  Chest pain    HISTORY OF PRESENT ILLNESS:   52 year old F hx DM2, HLD, TIA, peptic ulcer disease, known right renal lesion, who presents w/ syncope. At 1 pm she was praying, and felt dizzy. A friend was there, and witnessed patient unconscious and not responding for 3-4 minutes duration. EMS noted her BP was 190s systolic, and EKG on the field showed possible anterolateral abnormalities. She states having mid central to epigastric chest pain with this episode as well. She also had sharp chest pain yesterday. States having dark color stools more often, last endoscopy 3 years ago w/ PUD. Last c-scope w/ polyp resection. Also states having dysuria as well. Denies fevers, chills, vomiting, diarrhea, LE swelling.     Of note, patient was being treated for UTI last week and was taking a 5 day course of Abx which she completed. Soon after, the dysuria returned. She is now complaining of R sided flank pain along with her epigastric and chest discomfort.       Allergies    penicillin (Unknown)    Intolerances    	    MEDICATIONS:  aspirin enteric coated 81 milliGRAM(s) Oral daily  enoxaparin Injectable 40 milliGRAM(s) SubCutaneous every 24 hours    cefTRIAXone   IVPB 1000 milliGRAM(s) IV Intermittent every 24 hours      acetaminophen     Tablet .. 650 milliGRAM(s) Oral every 6 hours PRN  gabapentin 300 milliGRAM(s) Oral daily    famotidine    Tablet 20 milliGRAM(s) Oral daily    atorvastatin 20 milliGRAM(s) Oral at bedtime  dextrose 50% Injectable 25 Gram(s) IV Push once  dextrose 50% Injectable 12.5 Gram(s) IV Push once  dextrose 50% Injectable 25 Gram(s) IV Push once  dextrose Oral Gel 15 Gram(s) Oral once PRN  glucagon  Injectable 1 milliGRAM(s) IntraMuscular once  insulin lispro (ADMELOG) corrective regimen sliding scale   SubCutaneous three times a day before meals  insulin lispro (ADMELOG) corrective regimen sliding scale   SubCutaneous at bedtime    dextrose 5%. 1000 milliLiter(s) IV Continuous <Continuous>  dextrose 5%. 1000 milliLiter(s) IV Continuous <Continuous>  influenza   Vaccine 0.5 milliLiter(s) IntraMuscular once      PAST MEDICAL & SURGICAL HISTORY:  DM (diabetes mellitus)      HLD (hyperlipidemia)      Migraine      No significant past surgical history          FAMILY HISTORY:  FH: myocardial infarction (Mother)        SOCIAL HISTORY:    The patient is a nonsmoker       REVIEW OF SYSTEMS:  See HPI, otherwise complete 14 point review of systems negative    [x ] All others negative	  [ ] Unable to obtain    PHYSICAL EXAM:  T(C): 36.8 (03-08-23 @ 10:33), Max: 36.8 (03-08-23 @ 07:29)  HR: 83 (03-08-23 @ 10:33) (67 - 83)  BP: 92/59 (03-08-23 @ 10:33) (92/59 - 138/93)  RR: 18 (03-08-23 @ 10:33) (16 - 18)  SpO2: 98% (03-08-23 @ 10:33) (97% - 100%)  Wt(kg): --  I&O's Summary    08 Mar 2023 07:01  -  08 Mar 2023 13:39  --------------------------------------------------------  IN: 240 mL / OUT: 0 mL / NET: 240 mL        Appearance: No Acute Distress; resting comfortably  HEENT:  Normal oral mucosa, PERRL, EOMI	  Cardiovascular: Normal S1 S2, No JVD, No murmurs/rubs/gallops  Respiratory: Normal respiratory effort; Lungs clear to auscultation bilaterally  Gastrointestinal:  Soft, Non-tender, + BS	  Skin: No rashes, No ecchymoses, No cyanosis	  Neurologic: Non-focal; no weakness  Extremities: No clubbing, cyanosis or edema, Tobias's sign + on Right.   Vascular: Peripheral pulses palpable 2+ bilaterally  Psychiatry: A & O x 3, Mood & affect appropriate    Laboratory Data:	 	    CBC Full  -  ( 08 Mar 2023 06:15 )  WBC Count : 5.74 K/uL  Hemoglobin : 13.2 g/dL  Hematocrit : 42.5 %  Platelet Count - Automated : 302 K/uL  Mean Cell Volume : 90.6 fL  Mean Cell Hemoglobin : 28.1 pg  Mean Cell Hemoglobin Concentration : 31.1 gm/dL  Auto Neutrophil # : 2.10 K/uL  Auto Lymphocyte # : 2.94 K/uL  Auto Monocyte # : 0.51 K/uL  Auto Eosinophil # : 0.11 K/uL  Auto Basophil # : 0.05 K/uL  Auto Neutrophil % : 36.6 %  Auto Lymphocyte % : 51.2 %  Auto Monocyte % : 8.9 %  Auto Eosinophil % : 1.9 %  Auto Basophil % : 0.9 %    03-08    143  |  107  |  12  ----------------------------<  142<H>  3.6   |  21<L>  |  0.78  03-07    144  |  105  |  10  ----------------------------<  107<H>  3.9   |  26  |  0.78    Ca    9.3      08 Mar 2023 06:15  Ca    9.9      07 Mar 2023 17:13  Phos  4.0     03-08  Mg     2.30     03-08    TPro  6.6  /  Alb  4.2  /  TBili  0.6  /  DBili  x   /  AST  21  /  ALT  26  /  AlkPhos  85  03-08  TPro  8.3  /  Alb  5.1<H>  /  TBili  0.6  /  DBili  x   /  AST  24  /  ALT  27  /  AlkPhos  104  03-07      proBNP:   Lipid Profile:   HgA1c:   TSH: Thyroid Stimulating Hormone, Serum: 0.73 uIU/mL (03-08 @ 06:15)        CARDIAC MARKERS:            Interpretation of Telemetry:  N/a	    ECG:  Sinus rhythm with nonspecific ST changes.   RADIOLOGY:  OTHER: 	    PREVIOUS DIAGNOSTIC TESTING:    [ ] Echocardiogram:  [ ] Catheterization:  [ ] Stress Test:  	    Assessment:  52 year old F hx DM2, HLD, TIA, peptic ulcer disease, known right renal lesion, who presents w/ syncope and chest pain.     Plan of Care:    #Chest pain   - Troponing negative x1  - EKG shows sinus rhythm with no acute ischemic changes  - Low suspicion for ACS  - TTE pending  - Recommend proceeding with a nuclear stress test to assess for ischemia given risk factors    #HLD  - Statin as ordered    #DM  - A1c 6.7  - ISS  - Defer management to primary team    #UTI  - U/a positive  - On exam, Tobias's sign positive on the Right flank area. Concern for pyelonephritis  - Defer further management to primary team    #ACP (advance care planning)-  Advanced care planning was discussed with the patient and son who is at bedside.  Risks, benefits and alternatives of medical treatment and procedures were discussed in detail and all questions were answered. 30 additional minutes spent addressing advance care plans.    72 minutes spent on total encounter; more than 50% of the visit was spent counseling and/or coordinating care by the attending physician.   	  Gabino Burden DO [facility]  Cardiovascular Diseases  [phone]

## 2023-03-08 NOTE — PROGRESS NOTE ADULT - PROBLEM SELECTOR PLAN 7
F-s/p 1 L NS in ED  E-replete prn  N-DASH/TLC Halal diet  lovenox DVT prophylaxis    dispo: NST as per cardio , f/u urine c/s result

## 2023-03-08 NOTE — H&P ADULT - PROBLEM SELECTOR PLAN 3
-w/ epigastric pain, per pt darker stools, though Hg is stable  -monitor while inpt, if pt develops actual melena GI consult  -can c/w pantoprazole 40 mg add pepcid 20 mg daily

## 2023-03-08 NOTE — H&P ADULT - PROBLEM SELECTOR PLAN 4
-moderate sliding scale  -CC diet -1.5 cm hypodense lesion, this is known  -can f/u outpatient for repeat imaging  -also w/ breast calcification, can f/u outpatient for routine cancer screening

## 2023-03-08 NOTE — H&P ADULT - PROBLEM SELECTOR PLAN 6
F-s/p 1 L NS in ED  E-replete prn  N-DASH/TLC Halal diet  lovenox DVT prophylaxis, if melena develops d/c -c/w aspirin 81 mg, if pt develops melena d/c

## 2023-03-08 NOTE — H&P ADULT - PROBLEM SELECTOR PROBLEM 1
Name: Shira Davila ADMIT: 10/5/2021   : 1920  PCP: Aletha Rincon MD    MRN: [id number] LOS: 1 days   AGE/SEX: 89 y.o. female  ROOM: [facility]/     Subjective   Subjective   Pt mainly concerned that she will be sent to [facility]/SNF at discharge. Denies CP, palp, or SOA. She does endorse orthopnea and is sitting up in bed--asks me to raise HOB for her.   RLE hurts.  Voiding well. Tolerating diet.   RN reports 20 beat run of asymptomatic VTach this AM.  Pt says she has not moved bowels in 2 weeks.    Review of Systems   Constitutional: Positive for fatigue. Negative for appetite change, chills, diaphoresis and fever.   HENT: Negative for congestion, facial swelling, nosebleeds, rhinorrhea, sore throat, trouble swallowing and voice change.    Eyes: Negative for pain and visual disturbance.   Respiratory: Positive for shortness of breath (when laying flat). Negative for cough, chest tightness, wheezing and stridor.    Cardiovascular: Positive for leg swelling (RLE). Negative for chest pain and palpitations.   Gastrointestinal: Positive for constipation. Negative for abdominal pain, blood in stool, nausea and vomiting.   Endocrine: Negative for cold intolerance and heat intolerance.   Genitourinary: Negative for decreased urine volume, difficulty urinating, dysuria and hematuria.   Musculoskeletal: Negative for back pain and neck pain.   Skin: Negative for color change and pallor.   Allergic/Immunologic: Positive for environmental allergies and food allergies.   Neurological: Negative for seizures, syncope, facial asymmetry, speech difficulty and headaches.   Hematological: Negative for adenopathy. Does not bruise/bleed easily.   Psychiatric/Behavioral: Negative for behavioral problems, confusion and hallucinations.        Objective   Objective   Vital Signs  Temp:  [96.8 °F (36 °C)-98.2 °F (36.8 °C)] 97.4 °F (36.3 °C)  Heart Rate:  [] 83  Resp:  [16-18] 16  BP: (110-140)/(66-86) 123/66  SpO2:  [87 %-97 %]  95 %  on   ;   Device (Oxygen Therapy): room air  Body mass index is 20.32 kg/m².  Physical Exam  Vitals and nursing note reviewed.   Constitutional:       General: She is not in acute distress.     Appearance: She is ill-appearing (chronically). She is not toxic-appearing or diaphoretic.   HENT:      Head: Normocephalic and atraumatic.      Right Ear: External ear normal.      Left Ear: External ear normal.      Nose: Nose normal.      Mouth/Throat:      Mouth: Mucous membranes are moist.      Pharynx: Oropharynx is clear.      Comments: Poor dentition  Eyes:      General: No scleral icterus.        Right eye: No discharge.         Left eye: No discharge.      Extraocular Movements: Extraocular movements intact.      Conjunctiva/sclera: Conjunctivae normal.   Cardiovascular:      Rate and Rhythm: Normal rate. Rhythm irregular.      Pulses: Normal pulses.      Heart sounds: Murmur heard.     Pulmonary:      Effort: Pulmonary effort is normal. No respiratory distress.      Breath sounds: No wheezing or rales.      Comments: Decreased BS in bases  Abdominal:      General: Bowel sounds are normal. There is no distension.      Palpations: Abdomen is soft.      Tenderness: There is no abdominal tenderness.   Musculoskeletal:         General: Normal range of motion.      Cervical back: Neck supple. No rigidity.      Right lower leg: Edema (2+) present.      Left lower leg: Edema (trace-1+) present.   Lymphadenopathy:      Cervical: No cervical adenopathy.   Skin:     General: Skin is warm and dry.      Capillary Refill: Capillary refill takes less than 2 seconds.      Coloration: Skin is not jaundiced.      Findings: Bruising (shins) present. No rash.      Comments: Venous stasis changes in BLEs   Neurological:      General: No focal deficit present.      Mental Status: She is alert and oriented to person, place, and time. Mental status is at baseline.      Cranial Nerves: No cranial nerve deficit.      Coordination:  Coordination normal.      Comments: [facility]   Psychiatric:         Mood and Affect: Mood normal.         Behavior: Behavior normal.         Thought Content: Thought content normal.         Results Review     I reviewed the patient's new clinical results.  Results from last 7 days   Lab Units 10/06/21  0243 10/05/21  1520   WBC 10*3/mm3 6.18 6.89   HEMOGLOBIN g/dL 11.6* 12.2   PLATELETS 10*3/mm3 171 184     Results from last 7 days   Lab Units 10/06/21  0243 10/05/21  1520   SODIUM mmol/L 138 139   POTASSIUM mmol/L 4.0 4.2   CHLORIDE mmol/L 103 103   CO2 mmol/L 27.1 26.1   BUN mg/dL 25* 24*   CREATININE mg/dL 1.10* 1.25*   GLUCOSE mg/dL 85 107*   Estimated Creatinine Clearance: 23.6 mL/min (A) (by C-G formula based on SCr of 1.1 mg/dL (H)).  Results from last 7 days   Lab Units 10/05/21  1520   ALBUMIN g/dL 4.00   BILIRUBIN mg/dL 0.4   ALK PHOS U/L 75   AST (SGOT) U/L 32   ALT (SGPT) U/L 43*     Results from last 7 days   Lab Units 10/06/21  0243 10/05/21  1520   CALCIUM mg/dL 8.9 9.5   ALBUMIN g/dL  --  4.00   MAGNESIUM mg/dL 2.0 2.1       COVID19   Date Value Ref Range Status   10/05/2021 Not Detected Not Detected - Ref. Range Final     No results found for: HGBA1C, POCGLU    XR Abdomen KUB  Narrative: XR ABDOMEN KUB-     INDICATIONS: Constipation     TECHNIQUE: Supine views of the abdomen     COMPARISON: 07/29/2019     FINDINGS:      The bowel gas pattern is nonobstructive. Moderate colonic fecal  retention is apparent. No supine evidence for free intraperitoneal gas.  No definite nephrolithiasis. Follow-up/further evaluation can be  obtained as indicated.     Right more than left pleural effusions are apparent. The heart appears  enlarged, thoracic aorta appears tortuous, calcified.     Impression:    As described.     This report was finalized on 10/6/2021 11:53 AM by Dr. Gurvinder Carter M.D.       Scheduled Medications  aspirin, 81 mg, Oral, Daily  carbamide peroxide, 10 drop, Both Ears, BID  castor oil-balsam  peru, 1 application, Topical, Q12H  enoxaparin, 1 mg/kg, Subcutaneous, Q24H  furosemide, 20 mg, Intravenous, Daily  metoprolol tartrate, 25 mg, Oral, Q12H  oxybutynin XL, 5 mg, Oral, Daily Before Supper  potassium chloride, 10 mEq, Oral, BID  vitamin B-12, 500 mcg, Oral, Daily    Infusions   Diet  Diet Soft Texture; Ground; Cardiac       Assessment/Plan     Active Hospital Problems    Diagnosis  POA   • **Acute deep vein thrombosis (DVT) of femoral vein of right lower extremity (HCC) [I82.411]  Yes   • Stage 3a chronic kidney disease (HCC) [N18.31]  Yes   • Acute on chronic systolic CHF (congestive heart failure) (HCC) [I50.23]  Yes   • Pleural effusion, bilateral [J90]  Yes   • V-tach (HCC) [I47.2]  No   • New onset a-fib (HCC) [I48.91]  Yes   • Bilateral impacted cerumen [H61.23]  Yes   • Coronary artery disease [I25.10]  Yes   • Benign essential HTN [I10]  Yes   • Chronic constipation [K59.09]  Yes      Resolved Hospital Problems   No resolved problems to display.       Pleasant 89yo woman with h/o HTN, CAD, OAB, B12 deficiency, and chronic systolic CHF, who was sent to ER from urgent care with report of 1 week h/o worsening AGUAYO and pain/swelling of RLE. Found to have acute DVT in right femoral vein, acute on chronic systolic CHF, and new-onset AFib.     Acute DVT RLE  H/o LLE DVT per pt many years ago  Continue therapeutic Lovenox  Change to Eliquis at dc    New-onset AFib, 20 beat run of VTach (both asymptomatic)  Low dose Metoprolol for now, rate controlled  No ischemic w/u per Card  Echo pending  AC in place for DVT already  TSH and Mg++ wnl, K+ wnl    Acute on chronic systolic CHF (BNP 11k) with bilateral pleural effusions  IV Lasix for now  Monitor daily weights and I/Os  Echo pending    CAD with elevated troponin  No chest pain  No ischemic w/u planned per Card    CKD3a  Cr elevated slightly on admission  Now at pt's baseline  Watch closely in face of diuresis    HTN  On no meds PTA  BPs fine  here    OAB  Continue Ditropan XL    Constipation  Reviewed KUB, moderate fecal burden  Start bowel regimen      · Therapeutic dose of Lovenox should suffice for DVT prophylaxis.  · Full code.  · Discussed with patient, nursing staff, consulting provider, CCP and care team on multidisciplinary rounds  · Anticipate discharge TBD (PT/OT to see), timing yet to be determined.      Jose Armando Wayne MD  [facility]  10/06/21  13:50 EDT       Syncope

## 2023-03-08 NOTE — H&P ADULT - NSHPLABSRESULTS_GEN_ALL_CORE
LABS:                         14.5   7.99  )-----------( 338      ( 07 Mar 2023 17:13 )             45.9     03-07    144  |  105  |  10  ----------------------------<  107<H>  3.9   |  26  |  0.78    Ca    9.9      07 Mar 2023 17:13    TPro  8.3  /  Alb  5.1<H>  /  TBili  0.6  /  DBili  x   /  AST  24  /  ALT  27  /  AlkPhos  104  03-07      Urinalysis Basic - ( 07 Mar 2023 18:53 )    Color: yellow / Appearance: Clear / S.020 / pH: x  Gluc: x / Ketone: Negative  / Bili: Negative / Urobili: <2 mg/dL   Blood: x / Protein: Trace / Nitrite: Positive   Leuk Esterase: Large / RBC: 6 /HPF / WBC 56 /HPF   Sq Epi: x / Non Sq Epi: 5 /HPF / Bacteria: Negative

## 2023-03-08 NOTE — H&P ADULT - HISTORY OF PRESENT ILLNESS
Patient is a 52 year old F hx DM2, HLD, TIA, peptic ulcer disease, known right renal lesion, who presents w/ syncope. At 1 pm she was praying, and felt dizzy. A friend was there, and witnessed patient unconscious and not responding for 3-4 minutes duration. EMS noted her BP was 190s systolic, and EKG on the field showed possible anterolateral abnormalities. She states having mid central to epigastric chest pain with this episode as well. She also had sharp chest pain yesterday. States having dark color stools more often, last endoscopy 3 years ago w/ PUD. Last c-scope w/ polyp resection. Also states having dysuria as well. Denies fevers, chills, vomiting, diarrhea, LE swelling.     ED course: CT angio chest and abdomen/pelvis negative for PE, aortic dissection, pneumonia. Noted 1.5 cm right renal lesion, and breast calcifications  troponin negative  EKG NSR no acute ST elevation or pathologic Q waves, Qtc and VT intervals wnl.   head CT noncontrast w/o ICH, masses, or old CVAs noted

## 2023-03-08 NOTE — PROGRESS NOTE ADULT - PROBLEM SELECTOR PLAN 4
-1.5 cm hypodense lesion, this is known  -can f/u outpatient for repeat imaging  -also w/ breast calcification, can f/u outpatient for routine cancer screening

## 2023-03-08 NOTE — H&P ADULT - NSHPPHYSICALEXAM_GEN_ALL_CORE
PHYSICAL EXAM:  GENERAL: NAD, well-developed  HEAD:  Atraumatic, Normocephalic  EYES: EOMI, PERRLA, conjunctiva and sclera clear  NECK: Supple, No JVD  CHEST/LUNG: Clear to auscultation bilaterally; No wheeze  HEART: Regular rate and rhythm; No murmurs, rubs, or gallops  ABDOMEN: Soft, epigastric pain, Nondistended; Bowel sounds present  EXTREMITIES:  2+ Peripheral Pulses, No clubbing, cyanosis, or edema  PSYCH: AAOx3  NEUROLOGY: non-focal  SKIN: No rashes or lesions

## 2023-03-08 NOTE — PATIENT PROFILE ADULT - FALL HARM RISK - HARM RISK INTERVENTIONS

## 2023-03-08 NOTE — H&P ADULT - PROBLEM SELECTOR PLAN 1
-ddx include 2/2 UTI vs peptic ulcer vs cardiac event  -tx UTI and PUD as below  -troponin negative low concern for ACS, CT angio chest negative for PE  -check TTE as on the field pt had anterolateral defects on EKG  -check TSH in am

## 2023-03-09 ENCOUNTER — TRANSCRIPTION ENCOUNTER (OUTPATIENT)
Age: 53
End: 2023-03-09

## 2023-03-09 VITALS
OXYGEN SATURATION: 99 % | HEART RATE: 79 BPM | DIASTOLIC BLOOD PRESSURE: 73 MMHG | TEMPERATURE: 98 F | SYSTOLIC BLOOD PRESSURE: 123 MMHG | RESPIRATION RATE: 18 BRPM

## 2023-03-09 LAB
ANION GAP SERPL CALC-SCNC: 13 MMOL/L — SIGNIFICANT CHANGE UP (ref 7–14)
BASOPHILS # BLD AUTO: 0.05 K/UL — SIGNIFICANT CHANGE UP (ref 0–0.2)
BASOPHILS NFR BLD AUTO: 1 % — SIGNIFICANT CHANGE UP (ref 0–2)
BUN SERPL-MCNC: 16 MG/DL — SIGNIFICANT CHANGE UP (ref 7–23)
CALCIUM SERPL-MCNC: 9.2 MG/DL — SIGNIFICANT CHANGE UP (ref 8.4–10.5)
CHLORIDE SERPL-SCNC: 107 MMOL/L — SIGNIFICANT CHANGE UP (ref 98–107)
CO2 SERPL-SCNC: 23 MMOL/L — SIGNIFICANT CHANGE UP (ref 22–31)
CREAT SERPL-MCNC: 0.7 MG/DL — SIGNIFICANT CHANGE UP (ref 0.5–1.3)
CULTURE RESULTS: SIGNIFICANT CHANGE UP
EGFR: 105 ML/MIN/1.73M2 — SIGNIFICANT CHANGE UP
EOSINOPHIL # BLD AUTO: 0.08 K/UL — SIGNIFICANT CHANGE UP (ref 0–0.5)
EOSINOPHIL NFR BLD AUTO: 1.6 % — SIGNIFICANT CHANGE UP (ref 0–6)
GLUCOSE BLDC GLUCOMTR-MCNC: 117 MG/DL — HIGH (ref 70–99)
GLUCOSE BLDC GLUCOMTR-MCNC: 139 MG/DL — HIGH (ref 70–99)
GLUCOSE SERPL-MCNC: 151 MG/DL — HIGH (ref 70–99)
HCT VFR BLD CALC: 39.8 % — SIGNIFICANT CHANGE UP (ref 34.5–45)
HGB BLD-MCNC: 12.8 G/DL — SIGNIFICANT CHANGE UP (ref 11.5–15.5)
IANC: 1.96 K/UL — SIGNIFICANT CHANGE UP (ref 1.8–7.4)
IMM GRANULOCYTES NFR BLD AUTO: 0.4 % — SIGNIFICANT CHANGE UP (ref 0–0.9)
LYMPHOCYTES # BLD AUTO: 2.55 K/UL — SIGNIFICANT CHANGE UP (ref 1–3.3)
LYMPHOCYTES # BLD AUTO: 49.9 % — HIGH (ref 13–44)
MAGNESIUM SERPL-MCNC: 2.1 MG/DL — SIGNIFICANT CHANGE UP (ref 1.6–2.6)
MCHC RBC-ENTMCNC: 28.6 PG — SIGNIFICANT CHANGE UP (ref 27–34)
MCHC RBC-ENTMCNC: 32.2 GM/DL — SIGNIFICANT CHANGE UP (ref 32–36)
MCV RBC AUTO: 88.8 FL — SIGNIFICANT CHANGE UP (ref 80–100)
MONOCYTES # BLD AUTO: 0.45 K/UL — SIGNIFICANT CHANGE UP (ref 0–0.9)
MONOCYTES NFR BLD AUTO: 8.8 % — SIGNIFICANT CHANGE UP (ref 2–14)
NEUTROPHILS # BLD AUTO: 1.96 K/UL — SIGNIFICANT CHANGE UP (ref 1.8–7.4)
NEUTROPHILS NFR BLD AUTO: 38.3 % — LOW (ref 43–77)
NRBC # BLD: 0 /100 WBCS — SIGNIFICANT CHANGE UP (ref 0–0)
NRBC # FLD: 0 K/UL — SIGNIFICANT CHANGE UP (ref 0–0)
PHOSPHATE SERPL-MCNC: 4 MG/DL — SIGNIFICANT CHANGE UP (ref 2.5–4.5)
PLATELET # BLD AUTO: 299 K/UL — SIGNIFICANT CHANGE UP (ref 150–400)
POTASSIUM SERPL-MCNC: 3.9 MMOL/L — SIGNIFICANT CHANGE UP (ref 3.5–5.3)
POTASSIUM SERPL-SCNC: 3.9 MMOL/L — SIGNIFICANT CHANGE UP (ref 3.5–5.3)
RBC # BLD: 4.48 M/UL — SIGNIFICANT CHANGE UP (ref 3.8–5.2)
RBC # FLD: 14 % — SIGNIFICANT CHANGE UP (ref 10.3–14.5)
SODIUM SERPL-SCNC: 143 MMOL/L — SIGNIFICANT CHANGE UP (ref 135–145)
SPECIMEN SOURCE: SIGNIFICANT CHANGE UP
WBC # BLD: 5.11 K/UL — SIGNIFICANT CHANGE UP (ref 3.8–10.5)
WBC # FLD AUTO: 5.11 K/UL — SIGNIFICANT CHANGE UP (ref 3.8–10.5)

## 2023-03-09 PROCEDURE — 93306 TTE W/DOPPLER COMPLETE: CPT | Mod: 26

## 2023-03-09 PROCEDURE — 93016 CV STRESS TEST SUPVJ ONLY: CPT | Mod: GC

## 2023-03-09 PROCEDURE — 93018 CV STRESS TEST I&R ONLY: CPT | Mod: GC

## 2023-03-09 PROCEDURE — 78452 HT MUSCLE IMAGE SPECT MULT: CPT | Mod: 26

## 2023-03-09 RX ORDER — GABAPENTIN 400 MG/1
1 CAPSULE ORAL
Qty: 0 | Refills: 0 | DISCHARGE

## 2023-03-09 RX ORDER — PANTOPRAZOLE SODIUM 20 MG/1
1 TABLET, DELAYED RELEASE ORAL
Qty: 30 | Refills: 0
Start: 2023-03-09 | End: 2023-04-07

## 2023-03-09 RX ORDER — ASPIRIN/CALCIUM CARB/MAGNESIUM 324 MG
1 TABLET ORAL
Qty: 0 | Refills: 0 | DISCHARGE
Start: 2023-03-09

## 2023-03-09 RX ORDER — ATORVASTATIN CALCIUM 80 MG/1
1 TABLET, FILM COATED ORAL
Qty: 0 | Refills: 0 | DISCHARGE
Start: 2023-03-09

## 2023-03-09 RX ORDER — GABAPENTIN 400 MG/1
1 CAPSULE ORAL
Qty: 0 | Refills: 0 | DISCHARGE
Start: 2023-03-09

## 2023-03-09 RX ORDER — ATORVASTATIN CALCIUM 80 MG/1
1 TABLET, FILM COATED ORAL
Qty: 30 | Refills: 0
Start: 2023-03-09 | End: 2023-04-07

## 2023-03-09 RX ORDER — FAMOTIDINE 10 MG/ML
1 INJECTION INTRAVENOUS
Qty: 30 | Refills: 0
Start: 2023-03-09 | End: 2023-04-07

## 2023-03-09 RX ADMIN — Medication 81 MILLIGRAM(S): at 17:14

## 2023-03-09 RX ADMIN — ENOXAPARIN SODIUM 40 MILLIGRAM(S): 100 INJECTION SUBCUTANEOUS at 00:00

## 2023-03-09 RX ADMIN — FAMOTIDINE 20 MILLIGRAM(S): 10 INJECTION INTRAVENOUS at 17:14

## 2023-03-09 RX ADMIN — GABAPENTIN 300 MILLIGRAM(S): 400 CAPSULE ORAL at 17:14

## 2023-03-09 NOTE — PROGRESS NOTE ADULT - SUBJECTIVE AND OBJECTIVE BOX
Cardiovascular Disease Progress Note  Date of Service: 03-09-23 @ 09:20    Overnight events: No acute events overnight.    Patient reports mild chest pain.   Otherwise review of systems negative    Objective Findings:  T(C): 36.6 (03-09-23 @ 05:45), Max: 36.8 (03-08-23 @ 10:33)  HR: 69 (03-09-23 @ 05:45) (69 - 83)  BP: 106/72 (03-09-23 @ 05:45) (92/59 - 113/72)  RR: 19 (03-09-23 @ 05:45) (18 - 20)  SpO2: 97% (03-09-23 @ 05:45) (97% - 100%)  Wt(kg): --  Daily     Daily       Physical Exam:  Gen: NAD; Patient resting comfortably  HEENT: EOMI, Normocephalic/ atraumatic  CV: RRR, normal S1 + S2, no m/r/g  Lungs:  Normal respiratory effort; clear to auscultation bilaterally  Abd: soft, non-tender; bowel sounds present  Ext: No edema; warm and well perfused    Telemetry: Sinus; no ectopy    Laboratory Data:                        12.8   5.11  )-----------( 299      ( 09 Mar 2023 07:11 )             39.8     03-09    143  |  107  |  16  ----------------------------<  151<H>  3.9   |  23  |  0.70    Ca    9.2      09 Mar 2023 07:11  Phos  4.0     03-09  Mg     2.10     03-09    TPro  6.6  /  Alb  4.2  /  TBili  0.6  /  DBili  x   /  AST  21  /  ALT  26  /  AlkPhos  85  03-08              Inpatient Medications:  MEDICATIONS  (STANDING):  aspirin enteric coated 81 milliGRAM(s) Oral daily  atorvastatin 20 milliGRAM(s) Oral at bedtime  cefTRIAXone   IVPB 1000 milliGRAM(s) IV Intermittent every 24 hours  dextrose 5%. 1000 milliLiter(s) (100 mL/Hr) IV Continuous <Continuous>  dextrose 5%. 1000 milliLiter(s) (50 mL/Hr) IV Continuous <Continuous>  dextrose 50% Injectable 25 Gram(s) IV Push once  dextrose 50% Injectable 12.5 Gram(s) IV Push once  dextrose 50% Injectable 25 Gram(s) IV Push once  enoxaparin Injectable 40 milliGRAM(s) SubCutaneous every 24 hours  famotidine    Tablet 20 milliGRAM(s) Oral daily  gabapentin 300 milliGRAM(s) Oral daily  glucagon  Injectable 1 milliGRAM(s) IntraMuscular once  influenza   Vaccine 0.5 milliLiter(s) IntraMuscular once  insulin lispro (ADMELOG) corrective regimen sliding scale   SubCutaneous three times a day before meals  insulin lispro (ADMELOG) corrective regimen sliding scale   SubCutaneous at bedtime      Assessment: 52 year old F hx DM2, HLD, TIA, peptic ulcer disease, known right renal lesion, who presents w/ syncope and chest pain.     Plan of Care:    #Chest pain   - Patient has ruled out for ACS.  She still has mild chest pain, but EKG is nonischemic.   Plan for TTE and NST today.       #HLD  - Statin as ordered    #DM  - A1c 6.7  - ISS  - Defer management to primary team    Discussed with patient at length.     Over 35 minutes spent on total encounter; more than 50% of the visit was spent counseling and/or coordinating care by the attending physician.      Gonzalo Burden MD [facility]  Cardiovascular Disease  [phone]
Patient is a 52y old  Female who presents with a chief complaint of Syncope, cp (08 Mar 2023 13:37)      INTERVAL HPI/OVERNIGHT EVENTS: seen and examined, c/o flank pain   T(C): 36.5 (23 @ 20:10), Max: 36.8 (23 @ 07:29)  HR: 71 (23 @ 20:10) (71 - 83)  BP: 113/72 (23 @ 20:10) (92/59 - 133/77)  RR: 20 (23 @ 20:10) (17 - 20)  SpO2: 100% (23 @ 20:10) (97% - 100%)  Wt(kg): --  I&O's Summary    08 Mar 2023 07:01  -  08 Mar 2023 23:30  --------------------------------------------------------  IN: 240 mL / OUT: 0 mL / NET: 240 mL        PAST MEDICAL & SURGICAL HISTORY:  DM (diabetes mellitus)      HLD (hyperlipidemia)      Migraine      No significant past surgical history          SOCIAL HISTORY  Alcohol:  Tobacco:  Illicit substance use:    FAMILY HISTORY:    REVIEW OF SYSTEMS:  CONSTITUTIONAL: No fever, weight loss, or fatigue  EYES: No eye pain, visual disturbances, or discharge  ENMT:  No difficulty hearing, tinnitus, vertigo; No sinus or throat pain  NECK: No pain or stiffness  RESPIRATORY: No cough, wheezing, chills or hemoptysis; No shortness of breath  CARDIOVASCULAR: No chest pain, palpitations, dizziness, or leg swelling  GASTROINTESTINAL: No abdominal or epigastric pain. No nausea, vomiting, or hematemesis; No diarrhea or constipation. No melena or hematochezia.  GENITOURINARY: No dysuria, frequency, hematuria, or incontinence  NEUROLOGICAL: No headaches, memory loss, loss of strength, numbness, or tremors  SKIN: No itching, burning, rashes, or lesions   LYMPH NODES: No enlarged glands  ENDOCRINE: No heat or cold intolerance; No hair loss  MUSCULOSKELETAL: No joint pain or swelling; No muscle, back, or extremity pain  PSYCHIATRIC: No depression, anxiety, mood swings, or difficulty sleeping  HEME/LYMPH: No easy bruising, or bleeding gums  ALLERY AND IMMUNOLOGIC: No hives or eczema    RADIOLOGY & ADDITIONAL TESTS:    Imaging Personally Reviewed:  [ ] YES  [ ] NO    Consultant(s) Notes Reviewed:  [ ] YES  [ ] NO    PHYSICAL EXAM:  GENERAL: NAD, well-groomed, well-developed  HEAD:  Atraumatic, Normocephalic  EYES: EOMI, PERRLA, conjunctiva and sclera clear  ENMT: No tonsillar erythema, exudates, or enlargement; Moist mucous membranes, Good dentition, No lesions  NECK: Supple, No JVD, Normal thyroid  NERVOUS SYSTEM:  Alert & Oriented X3, Good concentration; Motor Strength 5/5 B/L upper and lower extremities; DTRs 2+ intact and symmetric  CHEST/LUNG: Clear to percussion bilaterally; No rales, rhonchi, wheezing, or rubs  HEART: Regular rate and rhythm; No murmurs, rubs, or gallops  ABDOMEN: Soft, Nontender, Nondistended; Bowel sounds present  EXTREMITIES:  2+ Peripheral Pulses, No clubbing, cyanosis, or edema  LYMPH: No lymphadenopathy noted  SKIN: No rashes or lesions    LABS:                        13.2   5.74  )-----------( 302      ( 08 Mar 2023 06:15 )             42.5     03-08    143  |  107  |  12  ----------------------------<  142<H>  3.6   |  21<L>  |  0.78    Ca    9.3      08 Mar 2023 06:15  Phos  4.0     03-08  Mg     2.30     03-08    TPro  6.6  /  Alb  4.2  /  TBili  0.6  /  DBili  x   /  AST  21  /  ALT  26  /  AlkPhos  85  03-08      Urinalysis Basic - ( 07 Mar 2023 18:53 )    Color: yellow / Appearance: Clear / S.020 / pH: x  Gluc: x / Ketone: Negative  / Bili: Negative / Urobili: <2 mg/dL   Blood: x / Protein: Trace / Nitrite: Positive   Leuk Esterase: Large / RBC: 6 /HPF / WBC 56 /HPF   Sq Epi: x / Non Sq Epi: 5 /HPF / Bacteria: Negative      CAPILLARY BLOOD GLUCOSE      POCT Blood Glucose.: 93 mg/dL (08 Mar 2023 21:26)  POCT Blood Glucose.: 103 mg/dL (08 Mar 2023 20:18)  POCT Blood Glucose.: 198 mg/dL (08 Mar 2023 17:00)  POCT Blood Glucose.: 122 mg/dL (08 Mar 2023 13:01)  POCT Blood Glucose.: 121 mg/dL (08 Mar 2023 09:11)  POCT Blood Glucose.: 106 mg/dL (08 Mar 2023 00:58)        Urinalysis Basic - ( 07 Mar 2023 18:53 )    Color: yellow / Appearance: Clear / S.020 / pH: x  Gluc: x / Ketone: Negative  / Bili: Negative / Urobili: <2 mg/dL   Blood: x / Protein: Trace / Nitrite: Positive   Leuk Esterase: Large / RBC: 6 /HPF / WBC 56 /HPF   Sq Epi: x / Non Sq Epi: 5 /HPF / Bacteria: Negative        MEDICATIONS  (STANDING):  aspirin enteric coated 81 milliGRAM(s) Oral daily  atorvastatin 20 milliGRAM(s) Oral at bedtime  cefTRIAXone   IVPB 1000 milliGRAM(s) IV Intermittent every 24 hours  dextrose 5%. 1000 milliLiter(s) (100 mL/Hr) IV Continuous <Continuous>  dextrose 5%. 1000 milliLiter(s) (50 mL/Hr) IV Continuous <Continuous>  dextrose 50% Injectable 25 Gram(s) IV Push once  dextrose 50% Injectable 12.5 Gram(s) IV Push once  dextrose 50% Injectable 25 Gram(s) IV Push once  enoxaparin Injectable 40 milliGRAM(s) SubCutaneous every 24 hours  famotidine    Tablet 20 milliGRAM(s) Oral daily  gabapentin 300 milliGRAM(s) Oral daily  glucagon  Injectable 1 milliGRAM(s) IntraMuscular once  influenza   Vaccine 0.5 milliLiter(s) IntraMuscular once  insulin lispro (ADMELOG) corrective regimen sliding scale   SubCutaneous three times a day before meals  insulin lispro (ADMELOG) corrective regimen sliding scale   SubCutaneous at bedtime    MEDICATIONS  (PRN):  acetaminophen     Tablet .. 650 milliGRAM(s) Oral every 6 hours PRN Temp greater or equal to 38C (100.4F), Mild Pain (1 - 3)  dextrose Oral Gel 15 Gram(s) Oral once PRN Blood Glucose LESS THAN 70 milliGRAM(s)/deciliter      Care Discussed with Consultants/Other Providers [ ] YES  [ ] NO

## 2023-03-09 NOTE — DISCHARGE NOTE PROVIDER - NSDCCPCAREPLAN_GEN_ALL_CORE_FT
PRINCIPAL DISCHARGE DIAGNOSIS  Diagnosis: Syncope  Assessment and Plan of Treatment: You had Extensive work up including a head CT,  Stress and Echo, all test were found to Be Normal      SECONDARY DISCHARGE DIAGNOSES  Diagnosis: Acute UTI  Assessment and Plan of Treatment: You were TReated with IV antibiotics   Always wipe from front to back after using the bathroom. Never wipe twice with the same tissue. Take showers and avoid prolonged baths. Try to empty the bladder at least every 4 hours during the day while awake, even if the need or urge to void is absent.  Do not try to hold your urine until a more convenient time or place.  Drink plenty of water.      Diagnosis: DM2 (diabetes mellitus, type 2)  Assessment and Plan of Treatment: Monitor finger sticks pre-meal and bedtime, low salt, fat and carbohydrate diet, minimize glucose intake.  Exercise daily for at least 30 minutes and weight loss.  Follow up with primary care physician and endocrinologist for routine Hemoglobin A1C checks and management.  Follow up with your ophthalmologist for routine yearly vision exams.      Diagnosis: Right renal mass  Assessment and Plan of Treatment: You were noted with a right renal lesion ( this was known to you)   Please Follow up with Your PMD or Urologist for further work up

## 2023-03-09 NOTE — DISCHARGE NOTE PROVIDER - CARE PROVIDER_API CALL
Gonzalo Burden)  Internal Medicine  [address] [place] [address]  Phone: [phone]  Fax: [phone]  Follow Up Time:

## 2023-03-09 NOTE — DISCHARGE NOTE PROVIDER - HOSPITAL COURSE
Patient is a 52 year old F hx DM2, HLD, TIA, peptic ulcer disease, known right renal lesion, who presents w/ syncope.    ·  Syncope.   -seen by cardio, S/P Stress test and Echo: Normal Study     ·Acute UTI. s/p ceftriaxone IV     · Peptic ulcer.   ·  Plan: c/w protonix   H/H stable.    ·  Right renal mass. 1.5 cm hypodense lesion, this is known  -can f/u outpatient for repeat imaging  -also w/ breast calcification, can f/u outpatient for routine cancer screening.    ·  DM2 (diabetes mellitus, type 2).   ·  Plan: -moderate sliding scale  -CC diet.    ·  History of TIAs.   ·  Plan: -c/w aspirin 81 mg.  Case discussed with attending, Pt is stable for Discharge Home

## 2023-03-09 NOTE — DISCHARGE NOTE NURSING/CASE MANAGEMENT/SOCIAL WORK - PATIENT PORTAL LINK FT
You can access the FollowMyHealth Patient Portal offered by [facility] by registering at the following website: [email or web address]. By joining [email or web address] [email or web address] portal, you will also be able to view your health information using other applications (apps) compatible with our system.

## 2023-03-09 NOTE — DISCHARGE NOTE NURSING/CASE MANAGEMENT/SOCIAL WORK - NSDCPEFALRISK_GEN_ALL_CORE
For information on Fall & Injury Prevention, visit: [email or web address] OR  [email or web address] OR  https://www.cdc.gov/steadi/patient.html

## 2023-03-09 NOTE — DISCHARGE NOTE PROVIDER - NSDCMRMEDTOKEN_GEN_ALL_CORE_FT
aspirin 81 mg oral delayed release tablet: 1 tab(s) orally once a day  atorvastatin 20 mg oral tablet: 1 tab(s) orally once a day (at bedtime)  famotidine 20 mg oral tablet: 1 tab(s) orally once a day  gabapentin 300 mg oral capsule: 1 cap(s) orally once a day  pantoprazole 40 mg oral delayed release tablet: 1 tab(s) orally once a day (before a meal)  Synjardy XR 5 mg-1000 mg oral tablet, extended release: 1 tab(s) orally once a day

## 2023-05-23 ENCOUNTER — EMERGENCY (EMERGENCY)
Facility: HOSPITAL | Age: 53
LOS: 1 days | Discharge: HOME | End: 2023-05-23
Admitting: STUDENT IN AN ORGANIZED HEALTH CARE EDUCATION/TRAINING PROGRAM
Payer: COMMERCIAL

## 2023-05-23 VITALS
DIASTOLIC BLOOD PRESSURE: 82 MMHG | SYSTOLIC BLOOD PRESSURE: 121 MMHG | RESPIRATION RATE: 18 BRPM | HEART RATE: 80 BPM | TEMPERATURE: 98 F | OXYGEN SATURATION: 100 %

## 2023-05-23 PROCEDURE — 99285 EMERGENCY DEPT VISIT HI MDM: CPT

## 2023-05-23 PROCEDURE — 93010 ELECTROCARDIOGRAM REPORT: CPT

## 2023-05-23 RX ORDER — IBUPROFEN 200 MG
600 TABLET ORAL ONCE
Refills: 0 | Status: COMPLETED | OUTPATIENT
Start: 2023-05-23 | End: 2023-05-23

## 2023-05-23 RX ORDER — DIAZEPAM 5 MG
5 TABLET ORAL ONCE
Refills: 0 | Status: DISCONTINUED | OUTPATIENT
Start: 2023-05-23 | End: 2023-05-23

## 2023-05-23 RX ADMIN — Medication 600 MILLIGRAM(S): at 22:53

## 2023-05-23 RX ADMIN — Medication 5 MILLIGRAM(S): at 22:53
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
## 2023-05-23 NOTE — ED PROVIDER NOTE - PROGRESS NOTE DETAILS
CYNTHIA [facility] Addendum----- In the interim, pt objectively noted to be resting comfortably; pt has been clinically stable; no issues or c/o thus far.  Labs/EKG unremarkable; pt requesting urine test.  Pt returned from CT; radiology report pending.  Pt will be signed out to day [facility] PA to follow up on CT and UA results. CYNTHIA [facility] Addendum-----This patient was signed out to me by CYNTHIA Weinstein; pending rib xrays and sternum xrays.  In the interim, pt objectively noted to be resting comfortably; pt has been clinically stable; no issues thus far.  Xray images reviewed by me; sternal xrays are limited but no gross pathology/fx noted to my eye; bilateral rib xrays without acute fx noted; prelim read radiology report negative; official report pending.  I discussed this with pt who is concerned and states urgent care doctor told her she needs a CT; pt requesting same.  CT ordered; labs/EKG ordered as well.  VSS, fingerstick 111.  Pt being given 1L NS IV fluids as pt stated she had felt dizzy; Card/Pulm/Abd exam benign with no bruising noted; no seatbelt sign appreciated, pt has NTTP exam.  Will continue to monitor. CYN[facility]IA [facility] Addendum-----CT resulted; IMPRESSION:  No acute CT chest findings.  UA negative for blood/leuk/nit.  Pt will be discharged home per below instructions. CYNTHIA [facility] Addendum----- In the interim, pt objectively noted to be resting comfortably; pt has been clinically stable; no issues or c/o thus far.  Labs/EKG unremarkable; pt requesting urine test.  Pt returned from CT; radiology report pending. CYNTHIA [facility] Addendum----- In the interim, pt objectively noted to be resting comfortably; pt has been clinically stable; no issues or c/o thus far.  Pt has not had abdominal pain; denies chest or back pain.  Labs/EKG unremarkable; pt requesting urine test.  Pt returned from CT; radiology report pending.  Pt has been objectively comfortable appearing, no c/o pain. [facility]NTHIA [facility] Addendum-----This patient was signed out to me by CYNTHIA Weinstein; pending rib xrays and sternum xrays, with plan that if x-rays negative, pt could be dc'd home.  In the interim, pt objectively noted to be resting comfortably; pt has been clinically stable; no issues thus far.  Xray images were reviewed by me; sternal xrays are limited but no gross pathology/fx noted to my eye; bilateral rib xrays without acute fx noted; prelim read radiology report negative; official report pending.  I discussed this with pt who is concerned and states urgent care doctor told her she needs a CT to rule out fracture; pt requesting CT.  CT ordered; labs/EKG ordered as well.  VSS, fingerstick 111.  Pt being given 1L NS IV fluids as pt stated she had felt dizzy; on my exam, Card/Pulm/Abd exam was benign, no bruising noted to thoracic/abd area, abdomen is soft, NTTP; no seatbelt sign appreciated, pt has NTTP exam.  Will continue to monitor. [facility]IA [facility] Addendum----- In the interim, pt objectively noted to be resting comfortably; pt has been clinically stable; no issues thus far.  CT resulted; IMPRESSION:  No acute CT chest findings.  UA negative for blood/leuk/nit.  Pt will be discharged home per below instructions.

## 2023-05-23 NOTE — ED PROVIDER NOTE - PATIENT PORTAL LINK FT
You can access the FollowMyHealth Patient Portal offered by [facility] by registering at the following website: [email or web address]. By joining [facility]’s FollowMyHealth portal, you will also be able to view your health information using other applications (apps) compatible with our system.

## 2023-05-23 NOTE — ED PROVIDER NOTE - NSFOLLOWUPINSTRUCTIONS_ED_ALL_ED_FT
Follow up with your primary care doctor within 2 days of ER discharge.  **Bring your discharge papers / test results with you to your follow up appointment.    If you ever need assistance finding a doctor to follow up with, you may call the [facility] Patient Access Services helpline at [phone] to find names/contact #s for a provider/specialist to follow up with.    Rest / no strenuous activity.  Stay well hydrated.    A copy of your test results is being provided to you.  All results including incidental findings were reviewed at discharge.  Should you have any questions that arise after you are discharged, please feel free to contact the ER to have your questions answered.    For pain, you may take ibuprofen (Motrin or Advil) or acetaminophen (Tylenol) as needed:   --ibuprofen (Motrin or Advil):  over-the-counter 200 milligram tablets:       Take three tablets (600 milligram dose) every 6 hours as needed for pain/inflammation; take WITH FOOD.   --acetaminophen (Tylenol):  over-the-counter; follow package instructions carefully regarding      dosage / usage.  Be careful not to exceed the total daily maximum dosage.      ***Return to the Emergency Department IMMEDIATELY if you experience any new / worsening symptoms or have any problems / concerns.***

## 2023-05-23 NOTE — ED ADULT NURSE NOTE - NSFALLUNIVINTERV_ED_ALL_ED
Bed/Stretcher in lowest position, wheels locked, appropriate side rails in place/Call bell, personal items and telephone in reach/Instruct patient to call for assistance before getting out of bed/chair/stretcher/Non-slip footwear applied when patient is off stretcher/[place] to call system/Physically safe environment - no spills, clutter or unnecessary equipment/Purposeful proactive rounding/Room/bathroom lighting operational, light cord in reach

## 2023-05-23 NOTE — ED PROVIDER NOTE - PHYSICAL EXAMINATION
constitutional: well appearing no acute distress, sitting comfortably   HEENT: head- normocephalic, atraumatic                 eyes- PERRLA                neck- full rom, no rigidity, no LAD  Cardiac: regular rate and rhythm, normal S1 S2 no murmurs rubs or gallops  Respiratory: able to speak in full sentences, no wheezing rales or rhonchi, lungs CTA b/l   Abd: Soft non tender non distended, no guarding, no palpable hernias or masses   Msk: substernal chest tenderness, b/l rib tenderness. paraspinal c spine tenderness   Neuro: A x O x 4, face symmetric

## 2023-05-24 VITALS
OXYGEN SATURATION: 97 % | HEART RATE: 76 BPM | RESPIRATION RATE: 18 BRPM | TEMPERATURE: 98 F | SYSTOLIC BLOOD PRESSURE: 112 MMHG | DIASTOLIC BLOOD PRESSURE: 63 MMHG

## 2023-05-24 LAB
ALBUMIN SERPL ELPH-MCNC: 4.2 G/DL — SIGNIFICANT CHANGE UP (ref 3.3–5)
ALP SERPL-CCNC: 76 U/L — SIGNIFICANT CHANGE UP (ref 40–120)
ALT FLD-CCNC: 16 U/L — SIGNIFICANT CHANGE UP (ref 4–33)
ANION GAP SERPL CALC-SCNC: 12 MMOL/L — SIGNIFICANT CHANGE UP (ref 7–14)
APPEARANCE UR: CLEAR — SIGNIFICANT CHANGE UP
APTT BLD: 35.2 SEC — SIGNIFICANT CHANGE UP (ref 27–36.3)
AST SERPL-CCNC: 15 U/L — SIGNIFICANT CHANGE UP (ref 4–32)
BASOPHILS # BLD AUTO: 0.06 K/UL — SIGNIFICANT CHANGE UP (ref 0–0.2)
BASOPHILS NFR BLD AUTO: 1 % — SIGNIFICANT CHANGE UP (ref 0–2)
BILIRUB SERPL-MCNC: 0.4 MG/DL — SIGNIFICANT CHANGE UP (ref 0.2–1.2)
BILIRUB UR-MCNC: NEGATIVE — SIGNIFICANT CHANGE UP
BUN SERPL-MCNC: 14 MG/DL — SIGNIFICANT CHANGE UP (ref 7–23)
CALCIUM SERPL-MCNC: 9.2 MG/DL — SIGNIFICANT CHANGE UP (ref 8.4–10.5)
CHLORIDE SERPL-SCNC: 107 MMOL/L — SIGNIFICANT CHANGE UP (ref 98–107)
CK SERPL-CCNC: 80 U/L — SIGNIFICANT CHANGE UP (ref 25–170)
CO2 SERPL-SCNC: 23 MMOL/L — SIGNIFICANT CHANGE UP (ref 22–31)
COLOR SPEC: YELLOW — SIGNIFICANT CHANGE UP
CREAT SERPL-MCNC: 0.69 MG/DL — SIGNIFICANT CHANGE UP (ref 0.5–1.3)
DIFF PNL FLD: NEGATIVE — SIGNIFICANT CHANGE UP
EGFR: 105 ML/MIN/1.73M2 — SIGNIFICANT CHANGE UP
EOSINOPHIL # BLD AUTO: 0.12 K/UL — SIGNIFICANT CHANGE UP (ref 0–0.5)
EOSINOPHIL NFR BLD AUTO: 1.9 % — SIGNIFICANT CHANGE UP (ref 0–6)
GLUCOSE SERPL-MCNC: 115 MG/DL — HIGH (ref 70–99)
GLUCOSE UR QL: ABNORMAL
HCT VFR BLD CALC: 36.4 % — SIGNIFICANT CHANGE UP (ref 34.5–45)
HGB BLD-MCNC: 11.8 G/DL — SIGNIFICANT CHANGE UP (ref 11.5–15.5)
IANC: 1.68 K/UL — LOW (ref 1.8–7.4)
IMM GRANULOCYTES NFR BLD AUTO: 0.3 % — SIGNIFICANT CHANGE UP (ref 0–0.9)
INR BLD: 1.14 RATIO — SIGNIFICANT CHANGE UP (ref 0.88–1.16)
KETONES UR-MCNC: NEGATIVE — SIGNIFICANT CHANGE UP
LEUKOCYTE ESTERASE UR-ACNC: NEGATIVE — SIGNIFICANT CHANGE UP
LYMPHOCYTES # BLD AUTO: 3.96 K/UL — HIGH (ref 1–3.3)
LYMPHOCYTES # BLD AUTO: 63.2 % — HIGH (ref 13–44)
MCHC RBC-ENTMCNC: 28.4 PG — SIGNIFICANT CHANGE UP (ref 27–34)
MCHC RBC-ENTMCNC: 32.4 GM/DL — SIGNIFICANT CHANGE UP (ref 32–36)
MCV RBC AUTO: 87.5 FL — SIGNIFICANT CHANGE UP (ref 80–100)
MONOCYTES # BLD AUTO: 0.43 K/UL — SIGNIFICANT CHANGE UP (ref 0–0.9)
MONOCYTES NFR BLD AUTO: 6.9 % — SIGNIFICANT CHANGE UP (ref 2–14)
NEUTROPHILS # BLD AUTO: 1.68 K/UL — LOW (ref 1.8–7.4)
NEUTROPHILS NFR BLD AUTO: 26.7 % — LOW (ref 43–77)
NITRITE UR-MCNC: NEGATIVE — SIGNIFICANT CHANGE UP
NRBC # BLD: 0 /100 WBCS — SIGNIFICANT CHANGE UP (ref 0–0)
NRBC # FLD: 0 K/UL — SIGNIFICANT CHANGE UP (ref 0–0)
PH UR: 5.5 — SIGNIFICANT CHANGE UP (ref 5–8)
PLATELET # BLD AUTO: 279 K/UL — SIGNIFICANT CHANGE UP (ref 150–400)
POTASSIUM SERPL-MCNC: 3.9 MMOL/L — SIGNIFICANT CHANGE UP (ref 3.5–5.3)
POTASSIUM SERPL-SCNC: 3.9 MMOL/L — SIGNIFICANT CHANGE UP (ref 3.5–5.3)
PROT SERPL-MCNC: 6.5 G/DL — SIGNIFICANT CHANGE UP (ref 6–8.3)
PROT UR-MCNC: ABNORMAL
PROTHROM AB SERPL-ACNC: 13.2 SEC — SIGNIFICANT CHANGE UP (ref 10.5–13.4)
RBC # BLD: 4.16 M/UL — SIGNIFICANT CHANGE UP (ref 3.8–5.2)
RBC # FLD: 13.7 % — SIGNIFICANT CHANGE UP (ref 10.3–14.5)
SODIUM SERPL-SCNC: 142 MMOL/L — SIGNIFICANT CHANGE UP (ref 135–145)
SP GR SPEC: 1.02 — SIGNIFICANT CHANGE UP (ref 1.01–1.05)
TROPONIN T, HIGH SENSITIVITY RESULT: <6 NG/L — SIGNIFICANT CHANGE UP
UROBILINOGEN FLD QL: SIGNIFICANT CHANGE UP
WBC # BLD: 6.27 K/UL — SIGNIFICANT CHANGE UP (ref 3.8–10.5)
WBC # FLD AUTO: 6.27 K/UL — SIGNIFICANT CHANGE UP (ref 3.8–10.5)

## 2023-05-24 PROCEDURE — 71120 X-RAY EXAM BREASTBONE 2/>VWS: CPT | Mod: 26

## 2023-05-24 PROCEDURE — 71110 X-RAY EXAM RIBS BIL 3 VIEWS: CPT | Mod: 26

## 2023-05-24 PROCEDURE — 71250 CT THORAX DX C-: CPT | Mod: 26,MG

## 2023-05-24 PROCEDURE — 93010 ELECTROCARDIOGRAM REPORT: CPT

## 2023-05-24 PROCEDURE — G1004: CPT

## 2023-05-24 RX ORDER — SODIUM CHLORIDE 9 MG/ML
1000 INJECTION INTRAMUSCULAR; INTRAVENOUS; SUBCUTANEOUS ONCE
Refills: 0 | Status: COMPLETED | OUTPATIENT
Start: 2023-05-24 | End: 2023-05-24

## 2023-05-24 RX ADMIN — SODIUM CHLORIDE 1000 MILLILITER(S): 9 INJECTION INTRAMUSCULAR; INTRAVENOUS; SUBCUTANEOUS at 03:36

## 2024-02-20 NOTE — PROGRESS NOTE ADULT - ASSESSMENT
Orthopaedic Hand Surgery Note    Name: Jasmeet Alvarado  YOB: 1962  Gender: male  MRN: [id number]    CC: Follow up of hand numbness    HPI: Patient is a 62 y.o. male who is here regarding follow up for  hand numbness and tingling. He is here to review his nerve conduction study.    ROS/Meds/PSH/PMH/FH/SH: I personally reviewed the patients standard intake form.  Pertinents are discussed in the HPI    Physical Examination:  Musculoskeletal:   Examination of the left upper extremity demonstrates normal sensation in median, ulnar and radial distribution, cap refill in all fingers < 5 seconds, positive carpal tunnel compression and Phalen test.  Mild prominence and instability of the base of first metacarpal. CMC grind is positive for pain and crepitus. Thumb MCP joint hyperextends 0 degrees.  There is moderate tenderness to palpation of the lateral epicondyle, no pain with elbow range of motion.  He does have exacerbation of elbow pain with resisted wrist and finger extension     Imaging / Electrodiagnostic Tests:     I independently reviewed and interpreted the patient's nerve conduction study. He has findings consistent with severe left carpal tunnel syndrome    Assessment:     ICD-10-CM    1. Left lateral epicondylitis  M77.12       2. Arthritis of carpometacarpal (CMC) joint of left thumb  M18.12       3. Left carpal tunnel syndrome  G56.02           Plan:  We discussed the diagnosis and different treatment options. We discussed observation, EMG/NCV, night splinting, cortisone injections and surgical decompression and the risks and benefits of all were clearly outlined. After discussing in detail the patient elects to proceed with surgical treatment for the carpal tunnel syndrome.    Patient understands risks and benefits of left ultrasound guided carpal tunnel release including but not limited to nerve injury, vessel injury, infection, failure to achieve desired results and possible need 
CAROL MC is a 50y with a history of T2DM (not on prescribe medications), HLD, B12 deficiency with neuropathy, migraines  who presented to [facility] on 8/14 with complaint of acute weakness, tingling of upper and lower extremities, right > left. Stroke w/u negative. PM&R consulted, with thought that symptoms may be related to conversion disorder and felt patient would benefit from acute IPR. Now admitted to [facility] after for initiation of a multidisciplinary rehab program consisting focused on functional mobility, transfers and ADLs (activities of daily living).    Comprehensive Multidisciplinary Rehab Program:  - comprehensive rehab program, 3 hours a day, 5 days a week.  - PT 1hr/day: Focused on improving strength, endurance, coordination, balance, functional mobility, and transfers  - OT 1hr/day: Focused on improving strength, fine motor skills, coordination, posture and ADLs.    - Speech Therapy 1hr/day: to diagnose and treat deficits in swallowing, cognition and communication. Need To advance Diet to reg consistency  - P&O as needed  - Activity Limitations: Decreased social, vocational and leisure activities, decreased self care and ADLs, decreased mobility, decreased ability to manage household and finances.     Participation Restrictions/Precautions:  - Weight bearing status: n/a  - ROM restrictions: n/a  - Precautions: Falls      Rehab Diagnosis/Management:    Sleep:   - Maintain quiet hours and low stim environment.    Pain Management:  - Tylenol PRN  - Avoid sedating medications that may interfere with cognitive recovery    GI/Bowel:  - At risk for constipation due to neurologic diagnosis, immobility and/or medication use  - Senna QHS- miralax daily- dulcolax tabs PRN  - GI ppx: protonix     /Bladder:   - At risk for incontinence and retention due to neurologic diagnosis and limited mobility  - Currently patient voids:    [x ] independent     [ ] external collection device (condom cath)    [ ] Indwelling duong catheter     [ ] Intermittent catheterization  - Baseline bladder scan, if > 350cc residual will start ISCs - baseline bladder scan 10cc  - Encourage timed voids every 4 hours while awake for independence and to promote continence during therapy.      Skin/Pressure Injury:   - At risk for pressure injury due to neurologic diagnosis and relative immobility.  - Skin assessment on admission: No breakdwon  - Turn every 2 hours while in bed, air mattress  - Soft heel protectors  - Skin barrier cream as needed  - Nursing to monitor skin Qshift    #Diet/Dysphagia:  - Diet; Soft  speech to clear for regular diet    #DVT ppx:  - lovenox   - SCDs  - Last Doppler on n/a  -------------  Comorbid Medical Management:    conversion disorder  Psych consult appreciated   Patient refuses medications, psych offers counseling     #DM type 2   -HA1C 7.2% on 8/14  -SSI and monitor accu checks  patient refusing metformin    #HLD  -Lipitor    Migraines   -On no medications as this time   -Tylenol prn     #B12 deficiency with neuropathy  -B12 level 451  -Folate 20 
CAROL MC is a 50y with a history of T2DM (not on prescribe medications), HLD, B12 deficiency with neuropathy, migraines  who presented to [facility] on 8/14 with complaint of acute weakness, tingling of upper and lower extremities, right > left. Stroke w/u negative. PM&R consulted, with thought that symptoms may be related to conversion disorder and felt patient would benefit from acute IPR. Now admitted to [facility] after for initiation of a multidisciplinary rehab program consisting focused on functional mobility, transfers and ADLs (activities of daily living).    Comprehensive Multidisciplinary Rehab Program:  - comprehensive rehab program, 3 hours a day, 5 days a week.  - PT 1hr/day: Focused on improving strength, endurance, coordination, balance, functional mobility, and transfers  - OT 1hr/day: Focused on improving strength, fine motor skills, coordination, posture and ADLs.    - Speech Therapy 1hr/day: to diagnose and treat deficits in swallowing, cognition and communication.   - P&O as needed  - Activity Limitations: Decreased social, vocational and leisure activities, decreased self care and ADLs, decreased mobility, decreased ability to manage household and finances.     Participation Restrictions/Precautions:  - Weight bearing status: n/a  - ROM restrictions: n/a  - Precautions: Falls      Rehab Diagnosis/Management:    Sleep:   - Maintain quiet hours and low stim environment.    Pain Management:  - Tylenol PRN  - Avoid sedating medications that may interfere with cognitive recovery    GI/Bowel:  - At risk for constipation due to neurologic diagnosis, immobility and/or medication use  reportedly no BM since 8/17  - Senna QHS- miralax daily- add dulcolax tabs PRN  - GI ppx: protonix     /Bladder:   - At risk for incontinence and retention due to neurologic diagnosis and limited mobility  - Currently patient voids:    [x ] independent     [ ] external collection device (condom cath)    [ ] Indwelling duong catheter     [ ] Intermittent catheterization  - Baseline bladder scan, if > 350cc residual will start ISCs - baseline bladder scan 10cc  - Encourage timed voids every 4 hours while awake for independence and to promote continence during therapy.      Skin/Pressure Injury:   - At risk for pressure injury due to neurologic diagnosis and relative immobility.  - Skin assessment on admission: No breakdwon  - Turn every 2 hours while in bed, air mattress  - Soft heel protectors  - Skin barrier cream as needed  - Nursing to monitor skin Qshift    #Diet/Dysphagia:  - Diet; Soft  speech to clear for regular diet    #DVT ppx:  - lovenox   - SCDs  - Last Doppler on n/a  -------------  Comorbid Medical Management:    conversion disorder  Psych to see- ? depression    #DM type 2   -HA1C 7.2% on 8/14  -SSI and monitor accu checks  patient refusing metformin    #HLD  -Lipitor    Migraines   -On no medications as this time   -Tylenol prn     #B12 deficiency with neuropathy  -B12 level 451  -Folate 20 
CAROL MC is a 50y with a history of T2DM (not on prescribe medications), HLD, B12 deficiency with neuropathy, migraines  who presented to [facility] on 8/14 with complaint of acute weakness, tingling of upper and lower extremities, right > left. Stroke w/u negative. PM&R consulted, with thought that symptoms may be related to conversion disorder and felt patient would benefit from acute IPR. Now admitted to [facility] after for initiation of a multidisciplinary rehab program consisting focused on functional mobility, transfers and ADLs (activities of daily living).    Comprehensive Multidisciplinary Rehab Program:  - comprehensive rehab program, 3 hours a day, 5 days a week.  - PT 1hr/day: Focused on improving strength, endurance, coordination, balance, functional mobility, and transfers  - OT 1hr/day: Focused on improving strength, fine motor skills, coordination, posture and ADLs.    - Speech Therapy 1hr/day: to diagnose and treat deficits in swallowing, cognition and communication.  Diet advanced to reg consistency  - P&O as needed  - Activity Limitations: Decreased social, vocational and leisure activities, decreased self care and ADLs, decreased mobility, decreased ability to manage household and finances.     Participation Restrictions/Precautions:  - Weight bearing status: n/a  - ROM restrictions: n/a  - Precautions: Falls      Rehab Diagnosis/Management:    Sleep:   - Maintain quiet hours and low stim environment.    Pain Management:  - Tylenol PRN  - Avoid sedating medications that may interfere with cognitive recovery    GI/Bowel:  - At risk for constipation due to neurologic diagnosis, immobility and/or medication use  - Senna QHS- miralax daily- dulcolax tabs PRN  - GI ppx: protonix     /Bladder:   - At risk for incontinence and retention due to neurologic diagnosis and limited mobility  - Currently patient voids:    [x ] independent     [ ] external collection device (condom cath)    [ ] Indwelling duong catheter     [ ] Intermittent catheterization  - Baseline bladder scan, if > 350cc residual will start ISCs - baseline bladder scan 10cc  - Encourage timed voids every 4 hours while awake for independence and to promote continence during therapy.      Skin/Pressure Injury:   - At risk for pressure injury due to neurologic diagnosis and relative immobility.  - Skin assessment on admission: No breakdwon  - Turn every 2 hours while in bed, air mattress  - Soft heel protectors  - Skin barrier cream as needed  - Nursing to monitor skin Qshift    #Diet/Dysphagia:  - Diet; Soft  speech cleared for regular diet    #DVT ppx:  - lovenox   - SCDs  - Last Doppler on n/a  -------------  Comorbid Medical Management:    conversion disorder  Psych consult appreciated   Patient refuses medications, psych offers counseling     #DM type 2   -HA1C 7.2% on 8/14  -SSI and monitor accu checks  patient refusing metformin    #HLD  -Lipitor    Migraines   -On no medications as this time   -Tylenol prn - dose increased to 975 mg q8h prn    #B12 deficiency with neuropathy  -B12 level 451  -Folate 20 
CAROL MC is a 50y with a history of T2DM (not on prescribe medications), HLD, B12 deficiency with neuropathy, migraines  who presented to [facility] on 8/14 with complaint of acute weakness, tingling of upper and lower extremities, right > left. Stroke w/u negative. PM&R consulted, with thought that symptoms may be related to conversion disorder and felt patient would benefit from acute IPR. Now admitted to [facility] after for initiation of a multidisciplinary rehab program consisting focused on functional mobility, transfers and ADLs (activities of daily living).    Comprehensive Multidisciplinary Rehab Program:  - comprehensive rehab program, 3 hours a day, 5 days a week.  - PT 1hr/day: Focused on improving strength, endurance, coordination, balance, functional mobility, and transfers  - OT 1hr/day: Focused on improving strength, fine motor skills, coordination, posture and ADLs.    - Speech Therapy 1hr/day: to diagnose and treat deficits in swallowing, cognition and communication. Need To advance Diet to reg consistency  - P&O as needed  - Activity Limitations: Decreased social, vocational and leisure activities, decreased self care and ADLs, decreased mobility, decreased ability to manage household and finances.     Participation Restrictions/Precautions:  - Weight bearing status: n/a  - ROM restrictions: n/a  - Precautions: Falls      Rehab Diagnosis/Management:    Sleep:   - Maintain quiet hours and low stim environment.    Pain Management:  - Tylenol PRN  - Avoid sedating medications that may interfere with cognitive recovery    GI/Bowel:  - At risk for constipation due to neurologic diagnosis, immobility and/or medication use  no BM since 8/21- encourage daily Miralax Use  - Senna QHS- miralax daily- add dulcolax tabs PRN  - GI ppx: protonix     /Bladder:   - At risk for incontinence and retention due to neurologic diagnosis and limited mobility  - Currently patient voids:    [x ] independent     [ ] external collection device (condom cath)    [ ] Indwelling duong catheter     [ ] Intermittent catheterization  - Baseline bladder scan, if > 350cc residual will start ISCs - baseline bladder scan 10cc  - Encourage timed voids every 4 hours while awake for independence and to promote continence during therapy.      Skin/Pressure Injury:   - At risk for pressure injury due to neurologic diagnosis and relative immobility.  - Skin assessment on admission: No breakdwon  - Turn every 2 hours while in bed, air mattress  - Soft heel protectors  - Skin barrier cream as needed  - Nursing to monitor skin Qshift    #Diet/Dysphagia:  - Diet; Soft  speech to clear for regular diet    #DVT ppx:  - lovenox   - SCDs  - Last Doppler on n/a  -------------  Comorbid Medical Management:    conversion disorder  Psych to see- ? depression    #DM type 2   -HA1C 7.2% on 8/14  -SSI and monitor accu checks  patient refusing metformin    #HLD  -Lipitor    Migraines   -On no medications as this time   -Tylenol prn     #B12 deficiency with neuropathy  -B12 level 451  -Folate 20 
CAROL MC is a 50y with a history of T2DM (not on prescribe medications), HLD, B12 deficiency with neuropathy, migraines  who presented to [facility] on 8/14 with complaint of acute weakness, tingling of upper and lower extremities, right > left. Stroke w/u negative. PM&R consulted, with thought that symptoms may be related to conversion disorder and felt patient would benefit from acute IPR. Now admitted to [facility] after for initiation of a multidisciplinary rehab program consisting focused on functional mobility, transfers and ADLs (activities of daily living).    Comprehensive Multidisciplinary Rehab Program:  - comprehensive rehab program, 3 hours a day, 5 days a week.  - PT 1hr/day: Focused on improving strength, endurance, coordination, balance, functional mobility, and transfers  - OT 1hr/day: Focused on improving strength, fine motor skills, coordination, posture and ADLs.    - Speech Therapy 1hr/day: to diagnose and treat deficits in swallowing, cognition and communication.   - P&O as needed  - Activity Limitations: Decreased social, vocational and leisure activities, decreased self care and ADLs, decreased mobility, decreased ability to manage household and finances.     Participation Restrictions/Precautions:  - Weight bearing status: n/a  - ROM restrictions: n/a  - Precautions: Falls      Rehab Diagnosis/Management:    Sleep:   - Maintain quiet hours and low stim environment.    Pain Management:  - Tylenol PRN  - Avoid sedating medications that may interfere with cognitive recovery    GI/Bowel:  - At risk for constipation due to neurologic diagnosis, immobility and/or medication use  moved bowels twice yesterday  - Senna QHS- miralax daily- add dulcolax tabs PRN  - GI ppx: protonix     /Bladder:   - At risk for incontinence and retention due to neurologic diagnosis and limited mobility  - Currently patient voids:    [x ] independent     [ ] external collection device (condom cath)    [ ] Indwelling duong catheter     [ ] Intermittent catheterization  - Baseline bladder scan, if > 350cc residual will start ISCs - baseline bladder scan 10cc  - Encourage timed voids every 4 hours while awake for independence and to promote continence during therapy.      Skin/Pressure Injury:   - At risk for pressure injury due to neurologic diagnosis and relative immobility.  - Skin assessment on admission: No breakdwon  - Turn every 2 hours while in bed, air mattress  - Soft heel protectors  - Skin barrier cream as needed  - Nursing to monitor skin Qshift    #Diet/Dysphagia:  - Diet; Soft  speech to clear for regular diet    #DVT ppx:  - lovenox   - SCDs  - Last Doppler on n/a  -------------  Comorbid Medical Management:    conversion disorder  Psych to see- ? depression    #DM type 2   -HA1C 7.2% on 8/14  -SSI and monitor accu checks  patient refusing metformin    #HLD  -Lipitor    Migraines   -On no medications as this time   -Tylenol prn     #B12 deficiency with neuropathy  -B12 level 451  -Folate 20 
CAROL MC is a 50y with a history of T2DM (not on prescribe medications), HLD, B12 deficiency with neuropathy, migraines  who presented to [facility] on 8/14 with complaint of acute weakness, tingling of upper and lower extremities, right > left. Stroke w/u negative. PM&R consulted, with thought that symptoms may be related to conversion disorder and felt patient would benefit from acute IPR. Now admitted to [facility] after for initiation of a multidisciplinary rehab program consisting focused on functional mobility, transfers and ADLs (activities of daily living).    Comprehensive Multidisciplinary Rehab Program:  - comprehensive rehab program, 3 hours a day, 5 days a week.  - PT 1hr/day: Focused on improving strength, endurance, coordination, balance, functional mobility, and transfers  - OT 1hr/day: Focused on improving strength, fine motor skills, coordination, posture and ADLs.    - Speech Therapy 1hr/day: to diagnose and treat deficits in swallowing, cognition and communication.  Diet advanced to reg consistency  - P&O as needed  - Activity Limitations: Decreased social, vocational and leisure activities, decreased self care and ADLs, decreased mobility, decreased ability to manage household and finances.   Patient is medically cleared to dc to home in the afternoon with daughter     Participation Restrictions/Precautions:  - Weight bearing status: n/a  - ROM restrictions: n/a  - Precautions: Falls      Rehab Diagnosis/Management:    Sleep:   - Maintain quiet hours and low stim environment.    Pain Management:  - Tylenol PRN  - Avoid sedating medications that may interfere with cognitive recovery    GI/Bowel:  - At risk for constipation due to neurologic diagnosis, immobility and/or medication use  - Senna QHS- miralax daily- dulcolax tabs PRN  - GI ppx: protonix     /Bladder:   - At risk for incontinence and retention due to neurologic diagnosis and limited mobility  - Currently patient voids:    [x ] independent     [ ] external collection device (condom cath)    [ ] Indwelling duong catheter     [ ] Intermittent catheterization  - Baseline bladder scan, if > 350cc residual will start ISCs - baseline bladder scan 10cc  - Encourage timed voids every 4 hours while awake for independence and to promote continence during therapy.      Skin/Pressure Injury:   - At risk for pressure injury due to neurologic diagnosis and relative immobility.  - Skin assessment on admission: No breakdwon  - Turn every 2 hours while in bed, air mattress  - Soft heel protectors  - Skin barrier cream as needed  - Nursing to monitor skin Qshift    #Diet/Dysphagia:  - Diet; Soft  speech cleared for regular diet    #DVT ppx:  - lovenox   - SCDs  - Last Doppler on n/a  -------------  Comorbid Medical Management:    conversion disorder  Psych consult appreciated   Patient refuses medications, psych offers counseling     #DM type 2   -HA1C 7.2% on 8/14  -SSI and monitor accu checks  patient refusing metformin    #HLD  -Lipitor    Migraines   -On no medications as this time   -Tylenol prn - dose increased to 975 mg q8h prn-no HAs today     #B12 deficiency with neuropathy  -B12 level 451  -Folate 20 
51 yo female with history of T2DM, HLD, B12 deficiency with neuropathy, migraines admitted to  rehab after hospital course for acute weakness, tingling of upper and lower extremities, right > left. BP was 160/118 upon presentation at the ED.
51 yo female with history of T2DM, HLD, B12 deficiency with neuropathy, migraines admitted to  rehab after hospital course for acute weakness, tingling of upper and lower extremities, right > left. BP was 160/118 upon presentation at the ED.
49 yo female with history of T2DM, HLD, B12 deficiency with neuropathy, migraines admitted to  rehab after hospital course for acute weakness, tingling of upper and lower extremities, right > left. BP was 160/118 upon presentation at the ED.
51 yo female with history of T2DM, HLD, B12 deficiency with neuropathy, migraines admitted to  rehab after hospital course for acute weakness, tingling of upper and lower extremities, right > left. BP was 160/118 upon presentation at the ED.

## 2024-11-08 NOTE — DIETITIAN INITIAL EVALUATION ADULT. - WEIGHT (LBS)
Overdue Results    Order Name Placed Expected Extend Order Cancel Order Order Details   BASIC METABOLIC PANEL 9/12/24 9/26/24  Extend  Cancel  Order Details     Basic Metabolic Panel [LAB15] (Order [id number])  Order  Date: 9/12/2024 Department: [facility] CARDIOLOGY Ordering/Authorizing: Reynaldo Alvarez APRN     Patient has completed a CMP with outside facility. See attached results.  COMPREHENSIVE METABOLIC PANEL (10/21/2024 02:40)    179

## 2024-12-03 NOTE — BH CONSULTATION LIAISON ASSESSMENT NOTE - FAMILY DETAILS
PRE-OPERATIVE HISTORY    1.  Medical Problems:      Cataract                                                      Diverticulitis                                                2.  Medications:    Current Facility-Administered Medications   Medication Dose Route Frequency Provider Last Rate Last Admin    PREMIX tropicamide 1 %, cyclopentolate 1 %, phenylephrine 2.5 % ophthalmic solution 1 drop  1 drop Left Eye Q5 Min Sravani Sidhu MD        proparacaine (ALCAINE) 0.5 % ophthalmic solution 1 drop  1 drop Left Eye Once Sravani Sidhu MD        lidocaine (XYLOCAINE) 2 % gel   Topical PRN Sravani Sidhu MD           3.  Allergies/Drug Sensitivities:    ALLERGIES:  Patient has no known allergies.      PHYSICAL EXAM    GENERAL: awake, alert and oriented and in no acute distress    LUNGS:  clear to auscultation    CARDIOVASCULAR: regular rate and rhythm    ABDOMEN:  soft, non-tender, nondistended, no hepatosplenomegaly, normal active bowel sounds, and no masses palpated    EXTREMITIES:  no erythema, induration, no evidence of joint effusion, Range of motion of all joints is normal    I have seen the patient and agree with the patient's PCP that the patient is stable for cataract surgery today.    
Lives with  and three children
